# Patient Record
Sex: FEMALE | Race: WHITE | NOT HISPANIC OR LATINO | Employment: UNEMPLOYED | URBAN - METROPOLITAN AREA
[De-identification: names, ages, dates, MRNs, and addresses within clinical notes are randomized per-mention and may not be internally consistent; named-entity substitution may affect disease eponyms.]

---

## 2017-02-08 ENCOUNTER — OFFICE VISIT (OUTPATIENT)
Dept: INTERNAL MEDICINE | Facility: CLINIC | Age: 62
End: 2017-02-08

## 2017-02-08 VITALS
WEIGHT: 226.6 LBS | BODY MASS INDEX: 35.56 KG/M2 | SYSTOLIC BLOOD PRESSURE: 124 MMHG | DIASTOLIC BLOOD PRESSURE: 84 MMHG | TEMPERATURE: 97.5 F | HEIGHT: 67 IN

## 2017-02-08 DIAGNOSIS — R73.09 ELEVATED GLUCOSE: ICD-10-CM

## 2017-02-08 DIAGNOSIS — R06.02 SOB (SHORTNESS OF BREATH): Primary | ICD-10-CM

## 2017-02-08 DIAGNOSIS — R20.2 PARESTHESIA: ICD-10-CM

## 2017-02-08 DIAGNOSIS — Z87.891 HISTORY OF TOBACCO ABUSE: ICD-10-CM

## 2017-02-08 DIAGNOSIS — R07.89 OTHER CHEST PAIN: ICD-10-CM

## 2017-02-08 DIAGNOSIS — D50.8 OTHER IRON DEFICIENCY ANEMIA: ICD-10-CM

## 2017-02-08 DIAGNOSIS — K21.00 GASTROESOPHAGEAL REFLUX DISEASE WITH ESOPHAGITIS: ICD-10-CM

## 2017-02-08 LAB
ALBUMIN SERPL-MCNC: 4.3 G/DL (ref 3.2–4.8)
ALBUMIN/GLOB SERPL: 1.8 G/DL (ref 1.5–2.5)
ALP SERPL-CCNC: 81 U/L (ref 25–100)
ALT SERPL W P-5'-P-CCNC: 16 U/L (ref 7–40)
ANION GAP SERPL CALCULATED.3IONS-SCNC: 12 MMOL/L (ref 3–11)
AST SERPL-CCNC: 26 U/L (ref 0–33)
BASOPHILS # BLD AUTO: 0.04 10*3/MM3 (ref 0–0.2)
BASOPHILS NFR BLD AUTO: 0.6 % (ref 0–1)
BILIRUB SERPL-MCNC: 0.5 MG/DL (ref 0.3–1.2)
BUN BLD-MCNC: 16 MG/DL (ref 9–23)
BUN/CREAT SERPL: 32 (ref 7–25)
CALCIUM SPEC-SCNC: 10.2 MG/DL (ref 8.7–10.4)
CHLORIDE SERPL-SCNC: 100 MMOL/L (ref 99–109)
CO2 SERPL-SCNC: 27 MMOL/L (ref 20–31)
CREAT BLD-MCNC: 0.5 MG/DL (ref 0.6–1.3)
DEPRECATED RDW RBC AUTO: 65.3 FL (ref 37–54)
EOSINOPHIL # BLD AUTO: 0.07 10*3/MM3 (ref 0.1–0.3)
EOSINOPHIL NFR BLD AUTO: 1.1 % (ref 0–3)
ERYTHROCYTE [DISTWIDTH] IN BLOOD BY AUTOMATED COUNT: 21.5 % (ref 11.3–14.5)
FERRITIN SERPL-MCNC: 20 NG/ML (ref 10–291)
GFR SERPL CREATININE-BSD FRML MDRD: 126 ML/MIN/1.73
GLOBULIN UR ELPH-MCNC: 2.4 GM/DL
GLUCOSE BLD-MCNC: 79 MG/DL (ref 70–100)
HBA1C MFR BLD: 4.5 %
HCT VFR BLD AUTO: 33.6 % (ref 34.5–44)
HGB BLD-MCNC: 9.6 G/DL (ref 11.5–15.5)
HYPOCHROMIA BLD QL: NORMAL
IMM GRANULOCYTES # BLD: 0.01 10*3/MM3 (ref 0–0.03)
IMM GRANULOCYTES NFR BLD: 0.2 % (ref 0–0.6)
IRON 24H UR-MRATE: 19 MCG/DL (ref 50–175)
IRON SATN MFR SERPL: 5 % (ref 15–50)
LYMPHOCYTES # BLD AUTO: 1.58 10*3/MM3 (ref 0.6–4.8)
LYMPHOCYTES NFR BLD AUTO: 25.6 % (ref 24–44)
MCH RBC QN AUTO: 23.6 PG (ref 27–31)
MCHC RBC AUTO-ENTMCNC: 28.6 G/DL (ref 32–36)
MCV RBC AUTO: 82.6 FL (ref 80–99)
MONOCYTES # BLD AUTO: 0.45 10*3/MM3 (ref 0–1)
MONOCYTES NFR BLD AUTO: 7.3 % (ref 0–12)
NEUTROPHILS # BLD AUTO: 4.02 10*3/MM3 (ref 1.5–8.3)
NEUTROPHILS NFR BLD AUTO: 65.2 % (ref 41–71)
PLAT MORPH BLD: NORMAL
PLATELET # BLD AUTO: 292 10*3/MM3 (ref 150–450)
PMV BLD AUTO: 11 FL (ref 6–12)
POTASSIUM BLD-SCNC: 4.3 MMOL/L (ref 3.5–5.5)
PROT SERPL-MCNC: 6.7 G/DL (ref 5.7–8.2)
RBC # BLD AUTO: 4.07 10*6/MM3 (ref 3.89–5.14)
SODIUM BLD-SCNC: 139 MMOL/L (ref 132–146)
TIBC SERPL-MCNC: 421 MCG/DL (ref 250–450)
TSH SERPL DL<=0.05 MIU/L-ACNC: 2.71 MIU/ML (ref 0.35–5.35)
WBC MORPH BLD: NORMAL
WBC NRBC COR # BLD: 6.17 10*3/MM3 (ref 3.5–10.8)

## 2017-02-08 PROCEDURE — 93000 ELECTROCARDIOGRAM COMPLETE: CPT | Performed by: INTERNAL MEDICINE

## 2017-02-08 PROCEDURE — 99204 OFFICE O/P NEW MOD 45 MIN: CPT | Performed by: INTERNAL MEDICINE

## 2017-02-08 PROCEDURE — 83540 ASSAY OF IRON: CPT | Performed by: INTERNAL MEDICINE

## 2017-02-08 PROCEDURE — 82728 ASSAY OF FERRITIN: CPT | Performed by: INTERNAL MEDICINE

## 2017-02-08 PROCEDURE — 84443 ASSAY THYROID STIM HORMONE: CPT | Performed by: INTERNAL MEDICINE

## 2017-02-08 PROCEDURE — 85007 BL SMEAR W/DIFF WBC COUNT: CPT | Performed by: INTERNAL MEDICINE

## 2017-02-08 PROCEDURE — 80053 COMPREHEN METABOLIC PANEL: CPT | Performed by: INTERNAL MEDICINE

## 2017-02-08 PROCEDURE — 83550 IRON BINDING TEST: CPT | Performed by: INTERNAL MEDICINE

## 2017-02-08 PROCEDURE — 85025 COMPLETE CBC W/AUTO DIFF WBC: CPT | Performed by: INTERNAL MEDICINE

## 2017-02-08 PROCEDURE — 83036 HEMOGLOBIN GLYCOSYLATED A1C: CPT | Performed by: INTERNAL MEDICINE

## 2017-02-08 PROCEDURE — 82607 VITAMIN B-12: CPT | Performed by: INTERNAL MEDICINE

## 2017-02-08 RX ORDER — FLUTICASONE PROPIONATE 110 UG/1
1 AEROSOL, METERED RESPIRATORY (INHALATION)
COMMUNITY
End: 2019-06-03

## 2017-02-08 RX ORDER — OMEPRAZOLE 40 MG/1
40 CAPSULE, DELAYED RELEASE ORAL DAILY
COMMUNITY
End: 2018-05-23 | Stop reason: ALTCHOICE

## 2017-02-08 RX ORDER — ALBUTEROL SULFATE 90 UG/1
2 AEROSOL, METERED RESPIRATORY (INHALATION) EVERY 4 HOURS PRN
COMMUNITY
End: 2019-06-03

## 2017-02-08 NOTE — PROGRESS NOTES
Subjective   Jyoti Puga is a 61 y.o. female.     History of Present Illness     New pt here to establish, used to see Dr Hsu    She has been having SOB over the last 3 years. Started after she had a chest cold and was placed on several rounds of antibiotics, which helped some but never completely.  She has seen an allergist at [facility] about a year ago and did have pfts and skin testing and was told she had asthma and allergies. They wanted her to start allergy shots but she did not want to. She was given inhalers and uses them prn but not daily. She does think they help some  She did not have asthma as a child. Her allergies were to some foods and some trees.   Sounds like mostly in morning she coughes up clear and yellow stuff, and not as much the rest of the day, but does get SOb freqeuently with minimal exertion  She has had CXR in '15 and was neg. Has not had CT scan of chest.   She was a smoker, 1 1/2-2 ppd x years,did quit 3-4 yrs ago. Had never been told she had emphysema    About 4 weeks ago she was walking to a truck at her brother's and felt her heart racing for about 5 minutes, and felt chest  tightness as well. Had an ekg done years ago which was ok, but never a stress test. Occasional PND. No swelling generally. At times will use 2 pillows, but also has reflux, and sounds like she does this more for the reflux    gerd H/o Barretts, and HH - she takes ompeprazole but not every day. She does feel better when she takes it. She has taken it every day for the last week, and actually thinks her cough and breathing are better too. Sees Dr lucas, and had an EGD about 3yrs ago.    She had a fall and scooter hit her L foot 5 yrs ago. She has seen several podiatrists through the years. Both feet are tingly from toes to midfoot.  Her a1c in 10/15 was 6.0. Not thirsty but craves ice and does have polyuria. She is on fe supplement as well. She is not sure when her last labs were      ECG 12  "Lead  Date/Time: 2/8/2017 4:04 PM  Performed by: ALMA FORTE.  Authorized by: ALMA FORTE   Previous ECG: no previous ECG available  Rhythm: sinus rhythm  Rate: normal  Conduction: conduction normal  ST Segments: ST segments normal  T Waves: T waves normal  QRS axis: normal  Other: no other findings  Clinical impression: normal ECG        spirometry - fev1 - 2.24, fvc 2.61, fev1/fvc 85%  a1c - 4.5    The following portions of the patient's history were reviewed and updated as appropriate: allergies, current medications, past family history, past medical history, past social history, past surgical history and problem list.    Review of Systems   Constitutional: Negative for activity change and appetite change.   HENT: Positive for congestion.    Respiratory: Positive for cough, shortness of breath and wheezing.    Cardiovascular: Positive for chest pain and palpitations. Negative for leg swelling.   Gastrointestinal: Negative for abdominal distention, abdominal pain, constipation and diarrhea.   Endocrine: Positive for polyuria. Negative for polydipsia.   Skin:        H/o skin cancer, sees derm, Dr Murali hobbsualjose f   Neurological: Positive for numbness.       Objective   Blood pressure 124/84, temperature 97.5 °F (36.4 °C), temperature source Temporal Artery , height 67\" (170.2 cm), weight 226 lb 9.6 oz (103 kg).  Physical Exam   Constitutional: She is oriented to person, place, and time. She appears well-developed and well-nourished. No distress.   HENT:   Head: Normocephalic and atraumatic.   Right Ear: External ear normal.   Left Ear: External ear normal.   Mouth/Throat: Oropharynx is clear and moist. No oropharyngeal exudate.   Eyes: Conjunctivae and EOM are normal. Pupils are equal, round, and reactive to light.   Neck: Normal range of motion. Neck supple. No tracheal deviation present. No thyromegaly present.   No bruits   Cardiovascular: Normal rate, regular rhythm, normal heart sounds and intact distal " pulses.  Exam reveals no gallop and no friction rub.    No murmur heard.  2+ pedal pulses B   Pulmonary/Chest: Effort normal and breath sounds normal. No respiratory distress. She has no wheezes. She has no rales. She exhibits no tenderness.   Abdominal: Soft. Bowel sounds are normal. She exhibits no mass. There is tenderness (mild diffuse tenderness). There is no rebound and no guarding. No hernia.   Musculoskeletal: She exhibits no edema or deformity.   Lymphadenopathy:     She has no cervical adenopathy.   Neurological: She is alert and oriented to person, place, and time. No cranial nerve deficit.   Sensation normal B le's   Skin: Skin is warm and dry. No rash noted. She is not diaphoretic. There is pallor.   Psychiatric: She has a normal mood and affect. Her behavior is normal. Judgment and thought content normal.       Assessment/Plan   Jyoti was seen today for establish care, foot swelling, cough and shortness of breath.    Diagnoses and all orders for this visit:    SOB (shortness of breath) - may be reflux induced asthma. Also heavy smoking history though has quit. Will discuss further on return perhaps a different inhaler and have her use daily instead of just prn. Will check stress test as well  -     Pulmonary Function Test  -     ECG 12 Lead  -     Comprehensive Metabolic Panel  -     TSH  -     CBC & Differential  -     CBC Auto Differential  -     Adult Stress Echo With Color & Doppler    Elevated glucose  -     POC Glycosylated Hemoglobin (Hb A1C)    Other iron deficiency anemia - craving ice  -     Iron Profile  -     Ferritin    Other chest pain  -     Adult Stress Echo With Color & Doppler    History of tobacco abuse - she would qualify for CT lung ca screen - will discuss more at f/u    Paresthesia - a1c is low. Will check b12 as well, and may need emg/ncv    GERD/Barretts - sees Dr shayy martínezly. Encouraged her to take the prilosec daily    Will request old records from Dr Hsu  and [facility]

## 2017-02-09 DIAGNOSIS — D64.9 ANEMIA, UNSPECIFIED: Primary | ICD-10-CM

## 2017-02-09 LAB — VIT B12 BLD-MCNC: 515 PG/ML (ref 211–911)

## 2017-02-10 ENCOUNTER — TELEPHONE (OUTPATIENT)
Dept: INTERNAL MEDICINE | Facility: CLINIC | Age: 62
End: 2017-02-10

## 2017-02-10 PROBLEM — R59.9 SWELLING OF LYMPH NODES: Status: ACTIVE | Noted: 2017-02-10

## 2017-02-10 PROBLEM — N20.0 NEPHROLITHIASIS: Status: ACTIVE | Noted: 2017-02-10

## 2017-02-10 PROBLEM — K22.70 BARRETT'S ESOPHAGUS: Status: ACTIVE | Noted: 2017-02-10

## 2017-02-10 PROBLEM — R73.09 ELEVATED GLUCOSE: Status: ACTIVE | Noted: 2017-02-10

## 2017-02-10 PROBLEM — E78.5 HYPERLIPIDEMIA: Status: ACTIVE | Noted: 2017-02-10

## 2017-02-10 PROBLEM — K21.9 ESOPHAGEAL REFLUX: Status: ACTIVE | Noted: 2017-02-10

## 2017-02-10 PROBLEM — K27.9 PUD (PEPTIC ULCER DISEASE): Status: ACTIVE | Noted: 2017-02-10

## 2017-02-10 PROBLEM — R06.2 WHEEZING: Status: ACTIVE | Noted: 2017-02-10

## 2017-02-10 NOTE — TELEPHONE ENCOUNTER
----- Message from Waleska Miller MD sent at 2/10/2017  8:05 AM EST -----  Regarding: old records  Can we get last colon/egd reports from Dr Sandoval office, and can we get office note/spiromentry/labs from Dr Davis's office?

## 2017-02-17 ENCOUNTER — HOSPITAL ENCOUNTER (OUTPATIENT)
Dept: CARDIOLOGY | Facility: HOSPITAL | Age: 62
Discharge: HOME | End: 2017-02-17
Admitting: INTERNAL MEDICINE

## 2017-02-17 VITALS
WEIGHT: 220 LBS | HEIGHT: 67 IN | DIASTOLIC BLOOD PRESSURE: 82 MMHG | SYSTOLIC BLOOD PRESSURE: 136 MMHG | BODY MASS INDEX: 34.53 KG/M2 | HEART RATE: 77 BPM

## 2017-02-17 LAB
BH CV ECHO MEAS - AO ROOT AREA (BSA CORRECTED): 1.2
BH CV ECHO MEAS - AO ROOT AREA: 5.3 CM^2
BH CV ECHO MEAS - AO ROOT DIAM: 2.6 CM
BH CV ECHO MEAS - BSA(HAYCOCK): 2.2 M^2
BH CV ECHO MEAS - BSA: 2.1 M^2
BH CV ECHO MEAS - BZI_BMI: 34.5 KILOGRAMS/M^2
BH CV ECHO MEAS - BZI_METRIC_HEIGHT: 170.2 CM
BH CV ECHO MEAS - BZI_METRIC_WEIGHT: 99.8 KG
BH CV ECHO MEAS - EDV(CUBED): 112.7 ML
BH CV ECHO MEAS - EDV(TEICH): 109.1 ML
BH CV ECHO MEAS - EF(CUBED): 77.9 %
BH CV ECHO MEAS - EF(TEICH): 70 %
BH CV ECHO MEAS - ESV(CUBED): 24.9 ML
BH CV ECHO MEAS - ESV(TEICH): 32.8 ML
BH CV ECHO MEAS - FS: 39.5 %
BH CV ECHO MEAS - IVS/LVPW: 1.1
BH CV ECHO MEAS - IVSD: 1.1 CM
BH CV ECHO MEAS - LA DIMENSION: 3.8 CM
BH CV ECHO MEAS - LA/AO: 1.5
BH CV ECHO MEAS - LV MASS(C)D: 186.2 GRAMS
BH CV ECHO MEAS - LV MASS(C)DI: 88.4 GRAMS/M^2
BH CV ECHO MEAS - LV MAX PG: 7.1 MMHG
BH CV ECHO MEAS - LV MEAN PG: 3 MMHG
BH CV ECHO MEAS - LV V1 MAX: 133 CM/SEC
BH CV ECHO MEAS - LV V1 MEAN: 78.6 CM/SEC
BH CV ECHO MEAS - LV V1 VTI: 29 CM
BH CV ECHO MEAS - LVIDD: 4.8 CM
BH CV ECHO MEAS - LVIDS: 2.9 CM
BH CV ECHO MEAS - LVOT AREA (M): 3.1 CM^2
BH CV ECHO MEAS - LVOT AREA: 3.1 CM^2
BH CV ECHO MEAS - LVOT DIAM: 2 CM
BH CV ECHO MEAS - LVPWD: 1 CM
BH CV ECHO MEAS - MV A MAX VEL: 77.5 CM/SEC
BH CV ECHO MEAS - MV DEC SLOPE: 369.5 CM/SEC^2
BH CV ECHO MEAS - MV DEC TIME: 0.26 SEC
BH CV ECHO MEAS - MV E MAX VEL: 127 CM/SEC
BH CV ECHO MEAS - MV E/A: 1.6
BH CV ECHO MEAS - MV P1/2T MAX VEL: 130 CM/SEC
BH CV ECHO MEAS - MV P1/2T: 103 MSEC
BH CV ECHO MEAS - MVA P1/2T LCG: 1.7 CM^2
BH CV ECHO MEAS - MVA(P1/2T): 2.1 CM^2
BH CV ECHO MEAS - PA ACC SLOPE: 714 CM/SEC^2
BH CV ECHO MEAS - PA ACC TIME: 0.16 SEC
BH CV ECHO MEAS - PA PR(ACCEL): 9.3 MMHG
BH CV ECHO MEAS - PULM DIAS VEL: 48.5 CM/SEC
BH CV ECHO MEAS - PULM S/D: 1.5
BH CV ECHO MEAS - PULM SYS VEL: 71.4 CM/SEC
BH CV ECHO MEAS - RAP SYSTOLE: 10 MMHG
BH CV ECHO MEAS - RV MAX PG: 1.6 MMHG
BH CV ECHO MEAS - RV V1 MAX: 64.2 CM/SEC
BH CV ECHO MEAS - RVSP: 23.2 MMHG
BH CV ECHO MEAS - SI(CUBED): 41.7 ML/M^2
BH CV ECHO MEAS - SI(LVOT): 43.3 ML/M^2
BH CV ECHO MEAS - SI(TEICH): 36.3 ML/M^2
BH CV ECHO MEAS - SV(CUBED): 87.8 ML
BH CV ECHO MEAS - SV(LVOT): 91.1 ML
BH CV ECHO MEAS - SV(TEICH): 76.3 ML
BH CV ECHO MEAS - TAPSE (>1.6): 2.7 CM2
BH CV ECHO MEAS - TR MAX VEL: 182 CM/SEC
BH CV STRESS BP STAGE 1: NORMAL
BH CV STRESS BP STAGE 2: NORMAL
BH CV STRESS BP STAGE 3: NORMAL
BH CV STRESS DOSE DOBUTAMINE STAGE 1: 10
BH CV STRESS DOSE DOBUTAMINE STAGE 2: 20
BH CV STRESS DOSE DOBUTAMINE STAGE 3: 30
BH CV STRESS DURATION MIN STAGE 1: 2
BH CV STRESS DURATION MIN STAGE 2: 2
BH CV STRESS DURATION MIN STAGE 3: 3
BH CV STRESS DURATION SEC STAGE 1: 0
BH CV STRESS DURATION SEC STAGE 2: 0
BH CV STRESS DURATION SEC STAGE 3: 25
BH CV STRESS ECHO POST STRESS EJECTION FRACTION EF: 75 %
BH CV STRESS HR STAGE 1: 88
BH CV STRESS HR STAGE 2: 116
BH CV STRESS HR STAGE 3: 144
BH CV STRESS PROTOCOL 1: NORMAL
BH CV STRESS RATE STAGE 1: 60
BH CV STRESS RATE STAGE 2: 120
BH CV STRESS RATE STAGE 3: 180
BH CV STRESS RECOVERY BP: NORMAL MMHG
BH CV STRESS RECOVERY HR: 89 BPM
BH CV STRESS STAGE 1: 1
BH CV STRESS STAGE 2: 2
BH CV STRESS STAGE 3: 3
BH CV XLRA - RV BASE: 2.6 CM
BH CV XLRA - RV LENGTH: 6.5 CM
BH CV XLRA - RV MID: 2 CM
BH CV XLRA - TDI S': 15.6 CM/SEC
LEFT ATRIUM VOLUME INDEX: 22.3 ML/M2
LEFT ATRIUM VOLUME: 47 CM3
LV EF 2D ECHO EST: 60 %
MAXIMAL PREDICTED HEART RATE: 161 BPM
PERCENT MAX PREDICTED HR: 89.44 %
STRESS BASELINE BP: NORMAL MMHG
STRESS BASELINE HR: 71 BPM
STRESS PERCENT HR: 105 %
STRESS POST ESTIMATED WORKLOAD: 1 METS
STRESS POST EXERCISE DUR MIN: 7 MIN
STRESS POST EXERCISE DUR SEC: 25 SEC
STRESS POST PEAK BP: NORMAL MMHG
STRESS POST PEAK HR: 144 BPM
STRESS TARGET HR: 137 BPM

## 2017-02-17 PROCEDURE — 93325 DOPPLER ECHO COLOR FLOW MAPG: CPT

## 2017-02-17 PROCEDURE — 93325 DOPPLER ECHO COLOR FLOW MAPG: CPT | Performed by: INTERNAL MEDICINE

## 2017-02-17 PROCEDURE — 93350 STRESS TTE ONLY: CPT | Performed by: INTERNAL MEDICINE

## 2017-02-17 PROCEDURE — 93320 DOPPLER ECHO COMPLETE: CPT | Performed by: INTERNAL MEDICINE

## 2017-02-17 PROCEDURE — 93018 CV STRESS TEST I&R ONLY: CPT | Performed by: INTERNAL MEDICINE

## 2017-02-17 PROCEDURE — 93017 CV STRESS TEST TRACING ONLY: CPT

## 2017-02-17 PROCEDURE — 93320 DOPPLER ECHO COMPLETE: CPT

## 2017-02-17 PROCEDURE — 93351 STRESS TTE COMPLETE: CPT

## 2017-02-17 RX ORDER — DOBUTAMINE HYDROCHLORIDE 100 MG/100ML
2-20 INJECTION INTRAVENOUS
Status: DISCONTINUED | OUTPATIENT
Start: 2017-02-17 | End: 2017-02-18 | Stop reason: HOSPADM

## 2017-02-20 ENCOUNTER — TELEPHONE (OUTPATIENT)
Dept: INTERNAL MEDICINE | Facility: CLINIC | Age: 62
End: 2017-02-20

## 2017-02-20 NOTE — TELEPHONE ENCOUNTER
----- Message from Waleska Miller MD sent at 2/20/2017  7:58 AM EST -----  Regarding: last labs  Can we see if Dr Hsu's office could send over last labs? I think pt did sign a DEUCE when she was here, but may take awhile to get all her records, and the labs we did were abnormal so I wanted to compare w/ her last ones

## 2017-02-22 ENCOUNTER — OFFICE VISIT (OUTPATIENT)
Dept: INTERNAL MEDICINE | Facility: CLINIC | Age: 62
End: 2017-02-22

## 2017-02-22 VITALS
HEIGHT: 67 IN | TEMPERATURE: 97.6 F | WEIGHT: 225.8 LBS | SYSTOLIC BLOOD PRESSURE: 112 MMHG | BODY MASS INDEX: 35.44 KG/M2 | DIASTOLIC BLOOD PRESSURE: 74 MMHG

## 2017-02-22 DIAGNOSIS — K22.70 BARRETT'S ESOPHAGUS WITHOUT DYSPLASIA: ICD-10-CM

## 2017-02-22 DIAGNOSIS — Z87.891 PERSONAL HISTORY OF TOBACCO USE, PRESENTING HAZARDS TO HEALTH: ICD-10-CM

## 2017-02-22 DIAGNOSIS — D50.8 OTHER IRON DEFICIENCY ANEMIA: Primary | ICD-10-CM

## 2017-02-22 DIAGNOSIS — J45.20 MILD INTERMITTENT ASTHMA WITHOUT COMPLICATION: ICD-10-CM

## 2017-02-22 PROCEDURE — 99214 OFFICE O/P EST MOD 30 MIN: CPT | Performed by: INTERNAL MEDICINE

## 2017-02-22 NOTE — TELEPHONE ENCOUNTER
I have faxed over the DEUCE to get her medical records.  I asked if they could go ahead and fax over her last labs.

## 2017-02-22 NOTE — PROGRESS NOTES
"Subjective   Jyoti Puga is a 61 y.o. female.     History of Present Illness     Here for f/u on her recent labs and stress test    fe def anemia - she had been taking fe supplement for the last month with food - can't take it on an empty stomach. Had been told her fe was low in past and had taken fe in the past intermittently. Her EGD was in '14 w/ Dr Sandoval and she is on a 3 yr schedule. The last colon was '14 and repeat due in 5 yrs. She does not know when her last labs were, and Dr Hsu's office does not have any recent labs on her    Gerd/Barretts - she is taking the prilosec daily. She is not sure if it is helping with the cough    Cough - She is taking flovent but not daily, but most days takes 2 puffs at a time when she takes. Uses zyrtec as needed as well.     Palpitations - no more episodes since last visit. Stress test wsa normal except for a few PAcs and PVCs    The following portions of the patient's history were reviewed and updated as appropriate: allergies, current medications, past family history, past medical history, past social history, past surgical history and problem list.    Review of Systems   Constitutional: Negative for unexpected weight change.   Respiratory: Positive for cough, shortness of breath and wheezing.    Cardiovascular: Negative for chest pain and palpitations (none recently).       Objective   Blood pressure 112/74, temperature 97.6 °F (36.4 °C), temperature source Temporal Artery , height 67\" (170.2 cm), weight 225 lb 12.8 oz (102 kg).  Physical Exam   Constitutional: She is oriented to person, place, and time. She appears well-developed and well-nourished. No distress.   HENT:   Head: Normocephalic and atraumatic.   Right Ear: External ear normal.   Left Ear: External ear normal.   Mouth/Throat: Oropharynx is clear and moist. No oropharyngeal exudate.   Eyes: Conjunctivae and EOM are normal. Pupils are equal, round, and reactive to light.   Neck: Normal range of " motion. Neck supple. No tracheal deviation present. No thyromegaly present.   No bruits   Cardiovascular: Normal rate, regular rhythm, normal heart sounds and intact distal pulses.  Exam reveals no gallop and no friction rub.    No murmur heard.  2+ pedal pulses B   Pulmonary/Chest: Effort normal and breath sounds normal. No respiratory distress. She has no wheezes. She has no rales. She exhibits no tenderness.   Abdominal: Soft. Bowel sounds are normal. She exhibits no mass. Tenderness: mild epigastric and RLQ. There is no rebound and no guarding. No hernia.   Musculoskeletal: She exhibits no edema or deformity.   Lymphadenopathy:     She has no cervical adenopathy.   Neurological: She is alert and oriented to person, place, and time. No cranial nerve deficit.   Sensation normal B le's   Skin: Skin is warm and dry. No rash noted. She is not diaphoretic. There is pallor.   Psychiatric: She has a normal mood and affect. Her behavior is normal. Judgment and thought content normal.       Assessment/Plan   Jyoti was seen today for follow-up.    Diagnoses and all orders for this visit:    Other iron deficiency anemia. Will have her increase fe to bid. She does take w/ food but cannot tolerate on an empty stomach. Will get egd. If this is neg, may need colon as well  -     Ambulatory referral for Screening EGD    Estrada's esophagus without dysplasia - due in 6/17 anyway  -     Ambulatory referral for Screening EGD    Personal history of tobacco use, presenting hazards to health  -     CT Chest Low Dose Wo    Mild intermittent asthma without complication - will try samples of dulera 200 2 bid to see if this helps her cough any better than the flovent. If she likes it, call for Rx        Prev - schedule a physical here

## 2017-02-28 ENCOUNTER — TELEPHONE (OUTPATIENT)
Dept: INTERNAL MEDICINE | Facility: CLINIC | Age: 62
End: 2017-02-28

## 2017-02-28 NOTE — TELEPHONE ENCOUNTER
----- Message from Marie Ferreira sent at 2/28/2017  9:43 AM EST -----  Contact: PATIENT  WOULD LIKE A CALL BACK REGARDING INS. PAYING FOR TEST, SHE SAID YOU ALL SPOKE ABOUT THIS AT HER LAST VISIT

## 2017-03-07 ENCOUNTER — HOSPITAL ENCOUNTER (OUTPATIENT)
Dept: CT IMAGING | Facility: HOSPITAL | Age: 62
Discharge: HOME | End: 2017-03-07
Admitting: INTERNAL MEDICINE

## 2017-03-07 PROCEDURE — G0297 LDCT FOR LUNG CA SCREEN: HCPCS

## 2017-03-08 ENCOUNTER — TELEPHONE (OUTPATIENT)
Dept: INTERNAL MEDICINE | Facility: CLINIC | Age: 62
End: 2017-03-08

## 2017-03-08 DIAGNOSIS — K44.9 HIATAL HERNIA: Primary | ICD-10-CM

## 2017-03-08 NOTE — TELEPHONE ENCOUNTER
----- Message from Waleska Miller MD sent at 3/8/2017  7:36 AM EST -----  Can you let her know, her cat scan did not show any lung cancer, but it did show that she has some emphysema, and also her hiatal hernia is very big - most of her stomach is in her chest. This may be causing some of her breathing problems. She will be seeing Dr Sandoval for f/u soon, but we could also have her see dr Franco Gongora, who works with Dr Sandoval, to see if the hiatal hernia would be worth repairing since it is so large

## 2017-05-22 ENCOUNTER — OFFICE VISIT (OUTPATIENT)
Dept: INTERNAL MEDICINE | Facility: CLINIC | Age: 62
End: 2017-05-22

## 2017-05-22 VITALS
HEART RATE: 68 BPM | TEMPERATURE: 97.3 F | DIASTOLIC BLOOD PRESSURE: 70 MMHG | SYSTOLIC BLOOD PRESSURE: 122 MMHG | BODY MASS INDEX: 34.62 KG/M2 | RESPIRATION RATE: 12 BRPM | HEIGHT: 67 IN | WEIGHT: 220.6 LBS | OXYGEN SATURATION: 95 %

## 2017-05-22 DIAGNOSIS — J44.9 CHRONIC OBSTRUCTIVE PULMONARY DISEASE, UNSPECIFIED COPD TYPE (HCC): ICD-10-CM

## 2017-05-22 DIAGNOSIS — Z23 NEED FOR PNEUMOCOCCAL VACCINATION: ICD-10-CM

## 2017-05-22 DIAGNOSIS — Z11.59 NEED FOR HEPATITIS C SCREENING TEST: ICD-10-CM

## 2017-05-22 DIAGNOSIS — Z78.0 POSTMENOPAUSAL: ICD-10-CM

## 2017-05-22 DIAGNOSIS — Z00.00 ENCOUNTER FOR ANNUAL PHYSICAL EXAM: Primary | ICD-10-CM

## 2017-05-22 DIAGNOSIS — F32.9 REACTIVE DEPRESSION: ICD-10-CM

## 2017-05-22 DIAGNOSIS — D50.8 OTHER IRON DEFICIENCY ANEMIA: ICD-10-CM

## 2017-05-22 LAB
ALBUMIN SERPL-MCNC: 4.2 G/DL (ref 3.2–4.8)
ALBUMIN/GLOB SERPL: 1.8 G/DL (ref 1.5–2.5)
ALP SERPL-CCNC: 75 U/L (ref 25–100)
ALT SERPL W P-5'-P-CCNC: 12 U/L (ref 7–40)
ANION GAP SERPL CALCULATED.3IONS-SCNC: 3 MMOL/L (ref 3–11)
ARTICHOKE IGE QN: 142 MG/DL (ref 0–130)
AST SERPL-CCNC: 19 U/L (ref 0–33)
BASOPHILS # BLD AUTO: 0.03 10*3/MM3 (ref 0–0.2)
BASOPHILS NFR BLD AUTO: 0.6 % (ref 0–1)
BILIRUB BLD-MCNC: NEGATIVE MG/DL
BILIRUB SERPL-MCNC: 0.5 MG/DL (ref 0.3–1.2)
BUN BLD-MCNC: 12 MG/DL (ref 9–23)
BUN/CREAT SERPL: 24 (ref 7–25)
CALCIUM SPEC-SCNC: 9.6 MG/DL (ref 8.7–10.4)
CHLORIDE SERPL-SCNC: 108 MMOL/L (ref 99–109)
CHOLEST SERPL-MCNC: 272 MG/DL (ref 0–200)
CLARITY, POC: CLEAR
CO2 SERPL-SCNC: 30 MMOL/L (ref 20–31)
COLOR UR: YELLOW
CREAT BLD-MCNC: 0.5 MG/DL (ref 0.6–1.3)
DEPRECATED RDW RBC AUTO: 53.2 FL (ref 37–54)
EOSINOPHIL # BLD AUTO: 0.08 10*3/MM3 (ref 0.1–0.3)
EOSINOPHIL NFR BLD AUTO: 1.6 % (ref 0–3)
ERYTHROCYTE [DISTWIDTH] IN BLOOD BY AUTOMATED COUNT: 16.4 % (ref 11.3–14.5)
FERRITIN SERPL-MCNC: 20 NG/ML (ref 10–291)
GFR SERPL CREATININE-BSD FRML MDRD: 126 ML/MIN/1.73
GLOBULIN UR ELPH-MCNC: 2.4 GM/DL
GLUCOSE BLD-MCNC: 96 MG/DL (ref 70–100)
GLUCOSE UR STRIP-MCNC: NEGATIVE MG/DL
HCT VFR BLD AUTO: 43 % (ref 34.5–44)
HCV AB SER DONR QL: NORMAL
HDLC SERPL-MCNC: 51 MG/DL (ref 40–60)
HGB BLD-MCNC: 13.7 G/DL (ref 11.5–15.5)
IMM GRANULOCYTES # BLD: 0.01 10*3/MM3 (ref 0–0.03)
IMM GRANULOCYTES NFR BLD: 0.2 % (ref 0–0.6)
IRON 24H UR-MRATE: 44 MCG/DL (ref 50–175)
IRON SATN MFR SERPL: 13 % (ref 15–50)
KETONES UR QL: NEGATIVE
LEUKOCYTE EST, POC: NEGATIVE
LYMPHOCYTES # BLD AUTO: 1.27 10*3/MM3 (ref 0.6–4.8)
LYMPHOCYTES NFR BLD AUTO: 25.7 % (ref 24–44)
MCH RBC QN AUTO: 28.3 PG (ref 27–31)
MCHC RBC AUTO-ENTMCNC: 31.9 G/DL (ref 32–36)
MCV RBC AUTO: 88.8 FL (ref 80–99)
MONOCYTES # BLD AUTO: 0.38 10*3/MM3 (ref 0–1)
MONOCYTES NFR BLD AUTO: 7.7 % (ref 0–12)
NEUTROPHILS # BLD AUTO: 3.18 10*3/MM3 (ref 1.5–8.3)
NEUTROPHILS NFR BLD AUTO: 64.2 % (ref 41–71)
NITRITE UR-MCNC: NEGATIVE MG/ML
PH UR: 6 [PH] (ref 5–8)
PLATELET # BLD AUTO: 180 10*3/MM3 (ref 150–450)
PMV BLD AUTO: 11.4 FL (ref 6–12)
POTASSIUM BLD-SCNC: 4.4 MMOL/L (ref 3.5–5.5)
PROT SERPL-MCNC: 6.6 G/DL (ref 5.7–8.2)
PROT UR STRIP-MCNC: NEGATIVE MG/DL
RBC # BLD AUTO: 4.84 10*6/MM3 (ref 3.89–5.14)
RBC # UR STRIP: NEGATIVE /UL
SODIUM BLD-SCNC: 141 MMOL/L (ref 132–146)
SP GR UR: 1.03 (ref 1–1.03)
TIBC SERPL-MCNC: 338 MCG/DL (ref 250–450)
TRIGL SERPL-MCNC: 455 MG/DL (ref 0–150)
UROBILINOGEN UR QL: NORMAL
WBC NRBC COR # BLD: 4.95 10*3/MM3 (ref 3.5–10.8)

## 2017-05-22 PROCEDURE — 83540 ASSAY OF IRON: CPT | Performed by: INTERNAL MEDICINE

## 2017-05-22 PROCEDURE — 90732 PPSV23 VACC 2 YRS+ SUBQ/IM: CPT | Performed by: INTERNAL MEDICINE

## 2017-05-22 PROCEDURE — 90471 IMMUNIZATION ADMIN: CPT | Performed by: INTERNAL MEDICINE

## 2017-05-22 PROCEDURE — 81003 URINALYSIS AUTO W/O SCOPE: CPT | Performed by: INTERNAL MEDICINE

## 2017-05-22 PROCEDURE — 86803 HEPATITIS C AB TEST: CPT | Performed by: INTERNAL MEDICINE

## 2017-05-22 PROCEDURE — 80053 COMPREHEN METABOLIC PANEL: CPT | Performed by: INTERNAL MEDICINE

## 2017-05-22 PROCEDURE — 82728 ASSAY OF FERRITIN: CPT | Performed by: INTERNAL MEDICINE

## 2017-05-22 PROCEDURE — 83550 IRON BINDING TEST: CPT | Performed by: INTERNAL MEDICINE

## 2017-05-22 PROCEDURE — 99396 PREV VISIT EST AGE 40-64: CPT | Performed by: INTERNAL MEDICINE

## 2017-05-22 PROCEDURE — 80061 LIPID PANEL: CPT | Performed by: INTERNAL MEDICINE

## 2017-05-22 PROCEDURE — 85025 COMPLETE CBC W/AUTO DIFF WBC: CPT | Performed by: INTERNAL MEDICINE

## 2017-06-08 ENCOUNTER — HOSPITAL ENCOUNTER (OUTPATIENT)
Dept: BONE DENSITY | Facility: HOSPITAL | Age: 62
Discharge: HOME | End: 2017-06-08
Admitting: INTERNAL MEDICINE

## 2017-06-08 ENCOUNTER — APPOINTMENT (OUTPATIENT)
Dept: BONE DENSITY | Facility: HOSPITAL | Age: 62
End: 2017-06-08

## 2017-06-08 PROCEDURE — 77080 DXA BONE DENSITY AXIAL: CPT | Performed by: RADIOLOGY

## 2017-06-08 PROCEDURE — 77080 DXA BONE DENSITY AXIAL: CPT

## 2017-09-29 ENCOUNTER — APPOINTMENT (OUTPATIENT)
Dept: BONE DENSITY | Facility: HOSPITAL | Age: 62
End: 2017-09-29

## 2018-02-07 ENCOUNTER — TELEPHONE (OUTPATIENT)
Dept: INTERNAL MEDICINE | Facility: CLINIC | Age: 63
End: 2018-02-07

## 2018-02-07 DIAGNOSIS — Z87.891 PERSONAL HISTORY OF TOBACCO USE, PRESENTING HAZARDS TO HEALTH: Primary | ICD-10-CM

## 2018-02-07 NOTE — TELEPHONE ENCOUNTER
----- Message from Waleska Miller MD sent at 2/7/2018  8:04 AM EST -----  She is due for her CT lung cancer screen in march - is she ok if we go ahead and schedule

## 2018-02-12 ENCOUNTER — HOSPITAL ENCOUNTER (OUTPATIENT)
Dept: CT IMAGING | Facility: HOSPITAL | Age: 63
Discharge: HOME | End: 2018-02-12
Admitting: INTERNAL MEDICINE

## 2018-02-12 PROCEDURE — G0297 LDCT FOR LUNG CA SCREEN: HCPCS

## 2018-02-14 ENCOUNTER — TELEPHONE (OUTPATIENT)
Dept: INTERNAL MEDICINE | Facility: CLINIC | Age: 63
End: 2018-02-14

## 2018-02-14 NOTE — TELEPHONE ENCOUNTER
----- Message from Waleska Miller MD sent at 2/14/2018  7:50 AM EST -----  Can you let her know, the CT scan looks stable - no significant change form last year. We can plan to repeat in 1 year. She is due for a f/u w/ me and fasting labs to recheck her triglycerides if she could schedule appt soon

## 2018-05-22 NOTE — PROGRESS NOTES
Here for physical     Emphysema - she feels like she is breathing better. She has not needed the albuterol inhaler much. Trying to walk more and has helped with her breathing     Large HH -and h/o Barretts. She is taking the omeprazole bid but insurance is not covering. She does think aciphex is covered. She sees Dr Lori MCFARLANE foot pain -through her ankle. had an injury where a scooter fell on her foot 2-3 yrs ago. She did see a new podiatrist last year and felt like it was arthritis. Hard to walk regularly because of the foot. Hurts on top of foot     Exercise - she has been trying to walk most days with the warmer weather; also doing some exercises w/ weights  Diet - healthy overall. Tries to watch her sweets.she is on fe 2-3/week. D occasionally, b12 occasionally, some cheese daily.    The following portions of the patient's history were reviewed and updated as appropriate: allergies, current medications, past family history, past medical history, past social history, past surgical history and problem list.    Review of Systems   Constitutional: Negative for fatigue and fever. wt stable  HENT: Negative for congestion and sore throat.  felt knot on neck about 6months ago  Eyes: Negative for visual disturbance.   Respiratory: Negative for cough and shortness of breath.    Cardiovascular: Negative for chest pain, palpitations and leg swelling.   Gastrointestinal: Negative for abdominal distention, abdominal pain, blood in stool, constipation, diarrhea and nausea.   Genitourinary: Negative for difficulty urinating, dysuria and frequency.   Musculoskeletal: + for arthralgias - L ankle. R shoulder has been hurting just today - no known injury  Skin: Negative for rash.   Allergic/Immunologic: Negative for immunocompromised state. noticed a knot in left neck x 6m  Neurological: Negative for dizziness and headaches. +numbness B toes x years  Psychiatric/Behavioral: + for dysphoric mood and sleep disturbance - wants  "to find a job so has been hard.       Objective    Vitals:    05/23/18 1021   BP: 126/84   BP Location: Right arm   Pulse: 63   Temp: 98.7 °F (37.1 °C)   TempSrc: Temporal Artery    SpO2: 96%   Weight: 101 kg (223 lb 6.4 oz)   Height: 167.6 cm (66\")     Physical Exam   Constitutional: She is oriented to person, place, and time. She appears well-developed and well-nourished. No distress.   HENT:   Head: Normocephalic and atraumatic.   Right Ear: External ear normal.   Left Ear: External ear normal.   Mouth/Throat: Oropharynx is clear and moist. No oropharyngeal exudate.   Eyes: Conjunctivae and EOM are normal. Pupils are equal, round, and reactive to light.   Neck: Normal range of motion. Neck supple. No thyromegaly present. posterior L cervical - ~2cm mobile knot, nontender  Cardiovascular: Normal rate, regular rhythm, normal heart sounds and intact distal pulses.  Exam reveals no gallop and no friction rub.    No murmur heard.  Pulmonary/Chest: Effort normal and breath sounds normal. No respiratory distress. She has no wheezes. She has no rales.   Breast exam: no masses, no tenderness, no skin lesions B  Abdominal: Soft. Bowel sounds are normal. She exhibits no mass. There is no tenderness. There is no rebound and no guarding.   Musculoskeletal:  She exhibits no edema or deformity. tender over R anterior shoulder, w/ some pain w/ active and passive ROM  Lymphadenopathy: left lateral neck as above; no other LNs appreciated  Neurological: She is alert and oriented to person, place, and time. No cranial nerve deficit. She exhibits normal muscle tone. Coordination normal.   Skin: Skin is warm and dry. She is not diaphoretic.   Psychiatric: She has a normal mood and affect. Her behavior is normal. Judgment and thought content normal.       Assessment/Plan   Jyoti was seen today for annual exam and heartburn.    Diagnoses and all orders for this visit:    Routine general medical examination at [facility] facility  Regular exercise/healthy diet. BSE q month. Sunscreen use encouraged. calcium intake reviewed. Check fasting labs  Jong due 6/18 - [facility]. She has it scheduled  Colon due in 3/22  DT due now  DEXA due 5/20 (normal in 5/17)  Shingles - shingrix discussed -  can check at pharmacy since we do not have   She had pneumovax (smoker) last year, will give prevnar age 67  CT lung cancer screen due in 2/19  She does not need paps as she had hysterectomy for benign reasons  -     CBC & Differential  -     Lipid Panel  -     TSH  -     Comprehensive Metabolic Panel  -     POC Urinalysis Dipstick, Automated  -     CBC Auto Differential    Estrada's esophagus without dysplasia  Comments:  EGD due in 3/19 w/ Lori  -     RABEprazole (ACIPHEX) 20 MG EC tablet; Take 1 tablet by mouth Daily.      Other iron deficiency anemia  -     Iron Profile  -     Ferritin    Need for Tdap vaccination  -     Tdap Vaccine Greater Than or Equal To 8yo IM    Foot pain, left -She will make a f/u appt w/ dr cardozo, the podiatrist; he had mentioned possibly an orthotic    Acute pain of right shoulder    Neck mass  -     US Head Neck Soft Tissue

## 2018-05-23 ENCOUNTER — OFFICE VISIT (OUTPATIENT)
Dept: INTERNAL MEDICINE | Facility: CLINIC | Age: 63
End: 2018-05-23

## 2018-05-23 VITALS
BODY MASS INDEX: 35.9 KG/M2 | HEIGHT: 66 IN | OXYGEN SATURATION: 96 % | WEIGHT: 223.4 LBS | TEMPERATURE: 98.7 F | DIASTOLIC BLOOD PRESSURE: 84 MMHG | HEART RATE: 63 BPM | SYSTOLIC BLOOD PRESSURE: 126 MMHG

## 2018-05-23 DIAGNOSIS — Z23 NEED FOR TDAP VACCINATION: ICD-10-CM

## 2018-05-23 DIAGNOSIS — D50.8 OTHER IRON DEFICIENCY ANEMIA: ICD-10-CM

## 2018-05-23 DIAGNOSIS — M79.672 FOOT PAIN, LEFT: ICD-10-CM

## 2018-05-23 DIAGNOSIS — R22.1 NECK MASS: ICD-10-CM

## 2018-05-23 DIAGNOSIS — K22.70 BARRETT'S ESOPHAGUS WITHOUT DYSPLASIA: ICD-10-CM

## 2018-05-23 DIAGNOSIS — M25.511 ACUTE PAIN OF RIGHT SHOULDER: ICD-10-CM

## 2018-05-23 DIAGNOSIS — Z00.00 ROUTINE GENERAL MEDICAL EXAMINATION AT A HEALTH CARE FACILITY: Primary | ICD-10-CM

## 2018-05-23 LAB
ALBUMIN SERPL-MCNC: 4.3 G/DL (ref 3.2–4.8)
ALBUMIN/GLOB SERPL: 1.7 G/DL (ref 1.5–2.5)
ALP SERPL-CCNC: 73 U/L (ref 25–100)
ALT SERPL W P-5'-P-CCNC: 11 U/L (ref 7–40)
ANION GAP SERPL CALCULATED.3IONS-SCNC: 0 MMOL/L (ref 3–11)
ARTICHOKE IGE QN: 132 MG/DL (ref 0–130)
AST SERPL-CCNC: 22 U/L (ref 0–33)
BASOPHILS # BLD AUTO: 0.04 10*3/MM3 (ref 0–0.2)
BASOPHILS NFR BLD AUTO: 0.7 % (ref 0–1)
BILIRUB BLD-MCNC: NEGATIVE MG/DL
BILIRUB SERPL-MCNC: 0.6 MG/DL (ref 0.3–1.2)
BUN BLD-MCNC: 14 MG/DL (ref 9–23)
BUN/CREAT SERPL: 23.3 (ref 7–25)
CALCIUM SPEC-SCNC: 9 MG/DL (ref 8.7–10.4)
CHLORIDE SERPL-SCNC: 102 MMOL/L (ref 99–109)
CHOLEST SERPL-MCNC: 243 MG/DL (ref 0–200)
CLARITY, POC: CLEAR
CO2 SERPL-SCNC: 37 MMOL/L (ref 20–31)
COLOR UR: YELLOW
CREAT BLD-MCNC: 0.6 MG/DL (ref 0.6–1.3)
DEPRECATED RDW RBC AUTO: 45.2 FL (ref 37–54)
EOSINOPHIL # BLD AUTO: 0.06 10*3/MM3 (ref 0–0.3)
EOSINOPHIL NFR BLD AUTO: 1.1 % (ref 0–3)
ERYTHROCYTE [DISTWIDTH] IN BLOOD BY AUTOMATED COUNT: 14.5 % (ref 11.3–14.5)
FERRITIN SERPL-MCNC: 11 NG/ML (ref 10–291)
GFR SERPL CREATININE-BSD FRML MDRD: 102 ML/MIN/1.73
GLOBULIN UR ELPH-MCNC: 2.5 GM/DL
GLUCOSE BLD-MCNC: 93 MG/DL (ref 70–100)
GLUCOSE UR STRIP-MCNC: NEGATIVE MG/DL
HCT VFR BLD AUTO: 39.7 % (ref 34.5–44)
HDLC SERPL-MCNC: 44 MG/DL (ref 40–60)
HGB BLD-MCNC: 12.8 G/DL (ref 11.5–15.5)
IMM GRANULOCYTES # BLD: 0.01 10*3/MM3 (ref 0–0.03)
IMM GRANULOCYTES NFR BLD: 0.2 % (ref 0–0.6)
IRON 24H UR-MRATE: 72 MCG/DL (ref 50–175)
IRON SATN MFR SERPL: 20 % (ref 15–50)
KETONES UR QL: NEGATIVE
LEUKOCYTE EST, POC: NEGATIVE
LYMPHOCYTES # BLD AUTO: 2.09 10*3/MM3 (ref 0.6–4.8)
LYMPHOCYTES NFR BLD AUTO: 37.2 % (ref 24–44)
MCH RBC QN AUTO: 27.5 PG (ref 27–31)
MCHC RBC AUTO-ENTMCNC: 32.2 G/DL (ref 32–36)
MCV RBC AUTO: 85.4 FL (ref 80–99)
MONOCYTES # BLD AUTO: 0.33 10*3/MM3 (ref 0–1)
MONOCYTES NFR BLD AUTO: 5.9 % (ref 0–12)
NEUTROPHILS # BLD AUTO: 3.1 10*3/MM3 (ref 1.5–8.3)
NEUTROPHILS NFR BLD AUTO: 55.1 % (ref 41–71)
NITRITE UR-MCNC: NEGATIVE MG/ML
PH UR: 6 [PH] (ref 5–8)
PLATELET # BLD AUTO: 213 10*3/MM3 (ref 150–450)
PMV BLD AUTO: 12 FL (ref 6–12)
POTASSIUM BLD-SCNC: 4.1 MMOL/L (ref 3.5–5.5)
PROT SERPL-MCNC: 6.8 G/DL (ref 5.7–8.2)
PROT UR STRIP-MCNC: NEGATIVE MG/DL
RBC # BLD AUTO: 4.65 10*6/MM3 (ref 3.89–5.14)
RBC # UR STRIP: NEGATIVE /UL
SODIUM BLD-SCNC: 139 MMOL/L (ref 132–146)
SP GR UR: 1.02 (ref 1–1.03)
TIBC SERPL-MCNC: 357 MCG/DL (ref 250–450)
TRIGL SERPL-MCNC: 455 MG/DL (ref 0–150)
TSH SERPL DL<=0.05 MIU/L-ACNC: 2.4 MIU/ML (ref 0.35–5.35)
UROBILINOGEN UR QL: NORMAL
WBC NRBC COR # BLD: 5.62 10*3/MM3 (ref 3.5–10.8)

## 2018-05-23 PROCEDURE — 85025 COMPLETE CBC W/AUTO DIFF WBC: CPT | Performed by: INTERNAL MEDICINE

## 2018-05-23 PROCEDURE — 84443 ASSAY THYROID STIM HORMONE: CPT | Performed by: INTERNAL MEDICINE

## 2018-05-23 PROCEDURE — 90471 IMMUNIZATION ADMIN: CPT | Performed by: INTERNAL MEDICINE

## 2018-05-23 PROCEDURE — 90715 TDAP VACCINE 7 YRS/> IM: CPT | Performed by: INTERNAL MEDICINE

## 2018-05-23 PROCEDURE — 99396 PREV VISIT EST AGE 40-64: CPT | Performed by: INTERNAL MEDICINE

## 2018-05-23 PROCEDURE — 83550 IRON BINDING TEST: CPT | Performed by: INTERNAL MEDICINE

## 2018-05-23 PROCEDURE — 80053 COMPREHEN METABOLIC PANEL: CPT | Performed by: INTERNAL MEDICINE

## 2018-05-23 PROCEDURE — 80061 LIPID PANEL: CPT | Performed by: INTERNAL MEDICINE

## 2018-05-23 PROCEDURE — 81003 URINALYSIS AUTO W/O SCOPE: CPT | Performed by: INTERNAL MEDICINE

## 2018-05-23 PROCEDURE — 82728 ASSAY OF FERRITIN: CPT | Performed by: INTERNAL MEDICINE

## 2018-05-23 PROCEDURE — 83540 ASSAY OF IRON: CPT | Performed by: INTERNAL MEDICINE

## 2018-05-23 RX ORDER — RABEPRAZOLE SODIUM 20 MG/1
20 TABLET, DELAYED RELEASE ORAL DAILY
Qty: 90 TABLET | Refills: 3 | Status: SHIPPED | OUTPATIENT
Start: 2018-05-23 | End: 2019-06-03 | Stop reason: SDUPTHER

## 2018-05-29 ENCOUNTER — HOSPITAL ENCOUNTER (OUTPATIENT)
Dept: ULTRASOUND IMAGING | Facility: HOSPITAL | Age: 63
Discharge: HOME | End: 2018-05-29
Attending: INTERNAL MEDICINE | Admitting: INTERNAL MEDICINE

## 2018-05-29 PROCEDURE — 76536 US EXAM OF HEAD AND NECK: CPT

## 2018-06-01 ENCOUNTER — TELEPHONE (OUTPATIENT)
Dept: INTERNAL MEDICINE | Facility: CLINIC | Age: 63
End: 2018-06-01

## 2018-06-01 RX ORDER — FENOFIBRATE 160 MG/1
160 TABLET ORAL DAILY
Qty: 30 TABLET | Refills: 2 | Status: SHIPPED | OUTPATIENT
Start: 2018-06-01 | End: 2018-09-02 | Stop reason: SDUPTHER

## 2018-06-01 NOTE — TELEPHONE ENCOUNTER
I think the fenofibrate would work better. Also work on diet - cut back on sweets, fried foods, red meat

## 2018-06-01 NOTE — TELEPHONE ENCOUNTER
----- Message from Waleska Miller MD sent at 6/1/2018 10:22 AM EDT -----  Can you let her know, the ultrasound is showing several probable lymph nodes on both sides of the neck. I would like to recheck an ultrasound in 3 months and see if they are shrinking. If she feels like the large one on the left gets any bigger before then, just give us a call.   Her triglycerides are still very high so I would like to start fenofibrate to get these down. If she could schedule a f/u w/ me in 3 months and come in fasting. We will recheck labs, reexamine neck, and get US scheduled

## 2018-09-04 RX ORDER — FENOFIBRATE 160 MG/1
TABLET ORAL
Qty: 30 TABLET | Refills: 0 | Status: SHIPPED | OUTPATIENT
Start: 2018-09-04 | End: 2018-10-07 | Stop reason: SDUPTHER

## 2018-09-05 ENCOUNTER — TELEPHONE (OUTPATIENT)
Dept: INTERNAL MEDICINE | Facility: CLINIC | Age: 63
End: 2018-09-05

## 2018-10-03 ENCOUNTER — TELEPHONE (OUTPATIENT)
Dept: INTERNAL MEDICINE | Facility: CLINIC | Age: 63
End: 2018-10-03

## 2018-10-03 DIAGNOSIS — E78.00 PURE HYPERCHOLESTEROLEMIA: Primary | ICD-10-CM

## 2018-10-03 NOTE — TELEPHONE ENCOUNTER
----- Message from Waleska Miller MD sent at 10/3/2018  7:37 AM EDT -----  Regarding: appt  She is due for appt w/ me and fasting labs if she can come in

## 2018-10-08 RX ORDER — FENOFIBRATE 160 MG/1
TABLET ORAL
Qty: 30 TABLET | Refills: 0 | Status: SHIPPED | OUTPATIENT
Start: 2018-10-08 | End: 2018-10-12 | Stop reason: SDUPTHER

## 2018-10-09 ENCOUNTER — LAB (OUTPATIENT)
Dept: INTERNAL MEDICINE | Facility: CLINIC | Age: 63
End: 2018-10-09

## 2018-10-09 DIAGNOSIS — E78.00 PURE HYPERCHOLESTEROLEMIA: ICD-10-CM

## 2018-10-09 LAB
ALBUMIN SERPL-MCNC: 4.07 G/DL (ref 3.2–4.8)
ALBUMIN/GLOB SERPL: 2.2 G/DL (ref 1.5–2.5)
ALP SERPL-CCNC: 38 U/L (ref 25–100)
ALT SERPL W P-5'-P-CCNC: 28 U/L (ref 7–40)
ANION GAP SERPL CALCULATED.3IONS-SCNC: 4 MMOL/L (ref 3–11)
ARTICHOKE IGE QN: 70 MG/DL (ref 0–130)
AST SERPL-CCNC: 36 U/L (ref 0–33)
BASOPHILS # BLD AUTO: 0.03 10*3/MM3 (ref 0–0.2)
BASOPHILS NFR BLD AUTO: 0.5 % (ref 0–1)
BILIRUB SERPL-MCNC: 0.4 MG/DL (ref 0.3–1.2)
BUN BLD-MCNC: 13 MG/DL (ref 9–23)
BUN/CREAT SERPL: 20 (ref 7–25)
CALCIUM SPEC-SCNC: 9 MG/DL (ref 8.7–10.4)
CHLORIDE SERPL-SCNC: 106 MMOL/L (ref 99–109)
CHOLEST SERPL-MCNC: 142 MG/DL (ref 0–200)
CO2 SERPL-SCNC: 28 MMOL/L (ref 20–31)
CREAT BLD-MCNC: 0.65 MG/DL (ref 0.6–1.3)
DEPRECATED RDW RBC AUTO: 41.8 FL (ref 37–54)
EOSINOPHIL # BLD AUTO: 0.09 10*3/MM3 (ref 0–0.3)
EOSINOPHIL NFR BLD AUTO: 1.6 % (ref 0–3)
ERYTHROCYTE [DISTWIDTH] IN BLOOD BY AUTOMATED COUNT: 13 % (ref 11.3–14.5)
GFR SERPL CREATININE-BSD FRML MDRD: 93 ML/MIN/1.73
GLOBULIN UR ELPH-MCNC: 1.8 GM/DL
GLUCOSE BLD-MCNC: 82 MG/DL (ref 70–100)
HCT VFR BLD AUTO: 34.5 % (ref 34.5–44)
HDLC SERPL-MCNC: 63 MG/DL (ref 40–60)
HGB BLD-MCNC: 11.1 G/DL (ref 11.5–15.5)
IMM GRANULOCYTES # BLD: 0.01 10*3/MM3 (ref 0–0.03)
IMM GRANULOCYTES NFR BLD: 0.2 % (ref 0–0.6)
LYMPHOCYTES # BLD AUTO: 1.9 10*3/MM3 (ref 0.6–4.8)
LYMPHOCYTES NFR BLD AUTO: 33.1 % (ref 24–44)
MCH RBC QN AUTO: 28.4 PG (ref 27–31)
MCHC RBC AUTO-ENTMCNC: 32.2 G/DL (ref 32–36)
MCV RBC AUTO: 88.2 FL (ref 80–99)
MONOCYTES # BLD AUTO: 0.45 10*3/MM3 (ref 0–1)
MONOCYTES NFR BLD AUTO: 7.8 % (ref 0–12)
NEUTROPHILS # BLD AUTO: 3.27 10*3/MM3 (ref 1.5–8.3)
NEUTROPHILS NFR BLD AUTO: 57 % (ref 41–71)
PLATELET # BLD AUTO: 246 10*3/MM3 (ref 150–450)
PMV BLD AUTO: 11.6 FL (ref 6–12)
POTASSIUM BLD-SCNC: 3.7 MMOL/L (ref 3.5–5.5)
PROT SERPL-MCNC: 5.9 G/DL (ref 5.7–8.2)
RBC # BLD AUTO: 3.91 10*6/MM3 (ref 3.89–5.14)
SODIUM BLD-SCNC: 138 MMOL/L (ref 132–146)
TRIGL SERPL-MCNC: 71 MG/DL (ref 0–150)
WBC NRBC COR # BLD: 5.74 10*3/MM3 (ref 3.5–10.8)

## 2018-10-09 PROCEDURE — 80053 COMPREHEN METABOLIC PANEL: CPT | Performed by: INTERNAL MEDICINE

## 2018-10-09 PROCEDURE — 80061 LIPID PANEL: CPT | Performed by: INTERNAL MEDICINE

## 2018-10-09 PROCEDURE — 85025 COMPLETE CBC W/AUTO DIFF WBC: CPT | Performed by: INTERNAL MEDICINE

## 2018-10-11 PROBLEM — E78.00 PURE HYPERCHOLESTEROLEMIA: Status: ACTIVE | Noted: 2017-02-10

## 2018-10-11 NOTE — PROGRESS NOTES
Subjective   Jyoti Puga is a 63 y.o. female.     History of Present Illness   Here for f/u on:    Hypertriglyceridemia - TG had been in the 400s in May. We started fenofibrate and recheck in the 70s. She is trying to work on diet nad is more active now, working at [employer]    Neck mass - in May she had a 2cm LN on left lateral neck. We did US which showed LNs B, largest being 2 x 0.9 x 2 cm w/ some cystic characteristics. Radiologist thought they were probably reactive, but recommended f/u imaging (US or CT).  She thinks it has lessened some in size. Has not notcied any LAD elsewhere. Feels ok - no nightsweats, no decrease in appetite. Wt is down but she has been trying to lose    L foot pain in top of foot primarily - she had an injury where a scooter fell on her foot 3 yrs ago. She has seen 3 podiatrists and one of them told she had arthritis. One was going to get her an orthotic, but she never heard back. She is working at [employer] now  Not much pain in R foot, but has noticed that there is numbness/ tingling in both feer in the toes. No tingling in her fingers.  She has tried tylenol, but does not help. She is on B12 daily and B12 was good last year. She used to drink more heavily, but not in last 20 yrs, drinks some wine now but not daily.    fe def - she is one Fe a day. Had been off for a few months, but back on.       The following portions of the patient's history were reviewed and updated as appropriate: allergies, current medications, past family history, past medical history, past social history, past surgical history and problem list.    Review of Systems   Constitutional: Positive for activity change and unexpected weight loss. Negative for appetite change and unexpected weight gain.   Respiratory: Negative for shortness of breath.    Cardiovascular: Negative for chest pain and leg swelling.   Musculoskeletal: Positive for arthralgias.   Allergic/Immunologic: Negative for immunocompromised  state.   Neurological: Positive for numbness (B toes).   Psychiatric/Behavioral: Positive for sleep disturbance (RLS intermittently).       Objective   Physical Exam  Results for orders placed or performed in visit on 10/09/18   Comprehensive Metabolic Panel   Result Value Ref Range    Glucose 82 70 - 100 mg/dL    BUN 13 9 - 23 mg/dL    Creatinine 0.65 0.60 - 1.30 mg/dL    Sodium 138 132 - 146 mmol/L    Potassium 3.7 3.5 - 5.5 mmol/L    Chloride 106 99 - 109 mmol/L    CO2 28.0 20.0 - 31.0 mmol/L    Calcium 9.0 8.7 - 10.4 mg/dL    Total Protein 5.9 5.7 - 8.2 g/dL    Albumin 4.07 3.20 - 4.80 g/dL    ALT (SGPT) 28 7 - 40 U/L    AST (SGOT) 36 (H) 0 - 33 U/L    Alkaline Phosphatase 38 25 - 100 U/L    Total Bilirubin 0.4 0.3 - 1.2 mg/dL    eGFR Non African Amer 93 >60 mL/min/1.73    Globulin 1.8 gm/dL    A/G Ratio 2.2 1.5 - 2.5 g/dL    BUN/Creatinine Ratio 20.0 7.0 - 25.0    Anion Gap 4.0 3.0 - 11.0 mmol/L   Lipid Panel   Result Value Ref Range    Total Cholesterol 142 0 - 200 mg/dL    Triglycerides 71 0 - 150 mg/dL    HDL Cholesterol 63 (H) 40 - 60 mg/dL    LDL Cholesterol  70 0 - 130 mg/dL   CBC Auto Differential   Result Value Ref Range    WBC 5.74 3.50 - 10.80 10*3/mm3    RBC 3.91 3.89 - 5.14 10*6/mm3    Hemoglobin 11.1 (L) 11.5 - 15.5 g/dL    Hematocrit 34.5 34.5 - 44.0 %    MCV 88.2 80.0 - 99.0 fL    MCH 28.4 27.0 - 31.0 pg    MCHC 32.2 32.0 - 36.0 g/dL    RDW 13.0 11.3 - 14.5 %    RDW-SD 41.8 37.0 - 54.0 fl    MPV 11.6 6.0 - 12.0 fL    Platelets 246 150 - 450 10*3/mm3       Assessment/Plan   Jyoti was seen today for foot pain, back pain and results.    Diagnoses and all orders for this visit:    Pure hypercholesterolemia  Comments:  much better w/ fenofibrate. continue healthy diet/exercise. recheck in 6m  Orders:  -     fenofibrate 160 MG tablet; Take 1 tablet by mouth Daily.    Estrada's esophagus without dysplasia  Comments:  egd due 3/19 w/ Lori    Swelling of lymph nodes  -     US Head Neck Soft  Tissue; Future    Paresthesia of foot, bilateral  -     EMG & Nerve Conduction Test; Future        She declines flu vaccine

## 2018-10-12 ENCOUNTER — OFFICE VISIT (OUTPATIENT)
Dept: INTERNAL MEDICINE | Facility: CLINIC | Age: 63
End: 2018-10-12

## 2018-10-12 VITALS
HEART RATE: 61 BPM | SYSTOLIC BLOOD PRESSURE: 128 MMHG | OXYGEN SATURATION: 98 % | HEIGHT: 66 IN | TEMPERATURE: 99.1 F | BODY MASS INDEX: 34.42 KG/M2 | DIASTOLIC BLOOD PRESSURE: 78 MMHG | WEIGHT: 214.2 LBS

## 2018-10-12 DIAGNOSIS — R59.9 SWELLING OF LYMPH NODES: ICD-10-CM

## 2018-10-12 DIAGNOSIS — E78.00 PURE HYPERCHOLESTEROLEMIA: Primary | ICD-10-CM

## 2018-10-12 DIAGNOSIS — K22.70 BARRETT'S ESOPHAGUS WITHOUT DYSPLASIA: ICD-10-CM

## 2018-10-12 DIAGNOSIS — R20.2 PARESTHESIA OF FOOT, BILATERAL: ICD-10-CM

## 2018-10-12 PROCEDURE — 99214 OFFICE O/P EST MOD 30 MIN: CPT | Performed by: INTERNAL MEDICINE

## 2018-10-12 RX ORDER — FENOFIBRATE 160 MG/1
160 TABLET ORAL DAILY
Qty: 30 TABLET | Refills: 5 | Status: SHIPPED | OUTPATIENT
Start: 2018-10-12 | End: 2018-10-15 | Stop reason: SDUPTHER

## 2018-10-15 ENCOUNTER — TELEPHONE (OUTPATIENT)
Dept: INTERNAL MEDICINE | Facility: CLINIC | Age: 63
End: 2018-10-15

## 2018-10-15 DIAGNOSIS — E78.00 PURE HYPERCHOLESTEROLEMIA: ICD-10-CM

## 2018-10-15 RX ORDER — FENOFIBRATE 160 MG/1
160 TABLET ORAL DAILY
Qty: 30 TABLET | Refills: 5 | Status: SHIPPED | OUTPATIENT
Start: 2018-10-15 | End: 2019-06-08 | Stop reason: SDUPTHER

## 2018-10-15 NOTE — TELEPHONE ENCOUNTER
Noted. Medication was resent electronically to pt's pharmacy. [facility].

## 2018-10-15 NOTE — TELEPHONE ENCOUNTER
Patient needs to have her fenofibrate 160 MG tablet sent again the pharmacy told her they did not get it.

## 2018-11-27 ENCOUNTER — HOSPITAL ENCOUNTER (OUTPATIENT)
Dept: NEUROLOGY | Facility: HOSPITAL | Age: 63
Discharge: HOME | End: 2018-11-27
Attending: INTERNAL MEDICINE | Admitting: INTERNAL MEDICINE

## 2018-11-27 ENCOUNTER — HOSPITAL ENCOUNTER (OUTPATIENT)
Dept: ULTRASOUND IMAGING | Facility: HOSPITAL | Age: 63
Discharge: HOME | End: 2018-11-27
Attending: INTERNAL MEDICINE

## 2018-11-27 DIAGNOSIS — R20.2 PARESTHESIA OF FOOT, BILATERAL: ICD-10-CM

## 2018-11-27 DIAGNOSIS — R59.9 SWELLING OF LYMPH NODES: ICD-10-CM

## 2018-11-27 PROCEDURE — 95912 NRV CNDJ TEST 11-12 STUDIES: CPT

## 2018-11-27 PROCEDURE — 95886 MUSC TEST DONE W/N TEST COMP: CPT

## 2018-11-27 PROCEDURE — 95910 NRV CNDJ TEST 7-8 STUDIES: CPT

## 2018-11-27 PROCEDURE — 76536 US EXAM OF HEAD AND NECK: CPT

## 2018-11-30 ENCOUNTER — TELEPHONE (OUTPATIENT)
Dept: INTERNAL MEDICINE | Facility: CLINIC | Age: 63
End: 2018-11-30

## 2018-12-05 DIAGNOSIS — R59.0 CERVICAL LYMPHADENOPATHY: Primary | ICD-10-CM

## 2018-12-12 ENCOUNTER — HOSPITAL ENCOUNTER (OUTPATIENT)
Dept: CT IMAGING | Facility: HOSPITAL | Age: 63
Discharge: HOME | End: 2018-12-12
Attending: INTERNAL MEDICINE | Admitting: INTERNAL MEDICINE

## 2018-12-12 DIAGNOSIS — R59.0 CERVICAL LYMPHADENOPATHY: ICD-10-CM

## 2018-12-12 PROCEDURE — 70491 CT SOFT TISSUE NECK W/DYE: CPT

## 2018-12-12 PROCEDURE — 82565 ASSAY OF CREATININE: CPT

## 2018-12-12 PROCEDURE — 25010000002 IOPAMIDOL 61 % SOLUTION: Performed by: INTERNAL MEDICINE

## 2018-12-12 RX ADMIN — IOPAMIDOL 75 ML: 612 INJECTION, SOLUTION INTRAVENOUS at 15:32

## 2018-12-13 LAB — CREAT BLDA-MCNC: 0.8 MG/DL (ref 0.6–1.3)

## 2018-12-19 ENCOUNTER — OFFICE VISIT (OUTPATIENT)
Dept: INTERNAL MEDICINE | Facility: CLINIC | Age: 63
End: 2018-12-19

## 2018-12-19 VITALS
HEIGHT: 66 IN | TEMPERATURE: 99.3 F | WEIGHT: 225.4 LBS | SYSTOLIC BLOOD PRESSURE: 126 MMHG | BODY MASS INDEX: 36.22 KG/M2 | DIASTOLIC BLOOD PRESSURE: 76 MMHG

## 2018-12-19 DIAGNOSIS — G62.9 NEUROPATHY: Primary | ICD-10-CM

## 2018-12-19 DIAGNOSIS — Z23 NEED FOR HEPATITIS A VACCINATION: ICD-10-CM

## 2018-12-19 DIAGNOSIS — M54.42 ACUTE MIDLINE LOW BACK PAIN WITH LEFT-SIDED SCIATICA: ICD-10-CM

## 2018-12-19 DIAGNOSIS — R59.0 LAD (LYMPHADENOPATHY), CERVICAL: ICD-10-CM

## 2018-12-19 DIAGNOSIS — D50.8 OTHER IRON DEFICIENCY ANEMIA: ICD-10-CM

## 2018-12-19 LAB
BASOPHILS # BLD AUTO: 0.03 10*3/MM3 (ref 0–0.2)
BASOPHILS NFR BLD AUTO: 0.5 % (ref 0–1)
DEPRECATED RDW RBC AUTO: 45.6 FL (ref 37–54)
EOSINOPHIL # BLD AUTO: 0.16 10*3/MM3 (ref 0–0.3)
EOSINOPHIL NFR BLD AUTO: 2.7 % (ref 0–3)
ERYTHROCYTE [DISTWIDTH] IN BLOOD BY AUTOMATED COUNT: 13.6 % (ref 11.3–14.5)
HCT VFR BLD AUTO: 40.8 % (ref 34.5–44)
HGB BLD-MCNC: 13 G/DL (ref 11.5–15.5)
IMM GRANULOCYTES # BLD: 0.01 10*3/MM3 (ref 0–0.03)
IMM GRANULOCYTES NFR BLD: 0.2 % (ref 0–0.6)
LYMPHOCYTES # BLD AUTO: 1.77 10*3/MM3 (ref 0.6–4.8)
LYMPHOCYTES NFR BLD AUTO: 29.4 % (ref 24–44)
MCH RBC QN AUTO: 29.4 PG (ref 27–31)
MCHC RBC AUTO-ENTMCNC: 31.9 G/DL (ref 32–36)
MCV RBC AUTO: 92.3 FL (ref 80–99)
MONOCYTES # BLD AUTO: 0.45 10*3/MM3 (ref 0–1)
MONOCYTES NFR BLD AUTO: 7.5 % (ref 0–12)
NEUTROPHILS # BLD AUTO: 3.62 10*3/MM3 (ref 1.5–8.3)
NEUTROPHILS NFR BLD AUTO: 59.9 % (ref 41–71)
PLATELET # BLD AUTO: 296 10*3/MM3 (ref 150–450)
PMV BLD AUTO: 11.4 FL (ref 6–12)
RBC # BLD AUTO: 4.42 10*6/MM3 (ref 3.89–5.14)
VIT B12 BLD-MCNC: 1030 PG/ML (ref 211–911)
WBC NRBC COR # BLD: 6.03 10*3/MM3 (ref 3.5–10.8)

## 2018-12-19 PROCEDURE — 86038 ANTINUCLEAR ANTIBODIES: CPT | Performed by: INTERNAL MEDICINE

## 2018-12-19 PROCEDURE — 84156 ASSAY OF PROTEIN URINE: CPT | Performed by: INTERNAL MEDICINE

## 2018-12-19 PROCEDURE — 99214 OFFICE O/P EST MOD 30 MIN: CPT | Performed by: INTERNAL MEDICINE

## 2018-12-19 PROCEDURE — 84166 PROTEIN E-PHORESIS/URINE/CSF: CPT | Performed by: INTERNAL MEDICINE

## 2018-12-19 PROCEDURE — 90632 HEPA VACCINE ADULT IM: CPT | Performed by: INTERNAL MEDICINE

## 2018-12-19 PROCEDURE — 85025 COMPLETE CBC W/AUTO DIFF WBC: CPT | Performed by: INTERNAL MEDICINE

## 2018-12-19 PROCEDURE — 90471 IMMUNIZATION ADMIN: CPT | Performed by: INTERNAL MEDICINE

## 2018-12-19 PROCEDURE — 84165 PROTEIN E-PHORESIS SERUM: CPT | Performed by: INTERNAL MEDICINE

## 2018-12-19 PROCEDURE — 82607 VITAMIN B-12: CPT | Performed by: INTERNAL MEDICINE

## 2018-12-19 PROCEDURE — 84155 ASSAY OF PROTEIN SERUM: CPT | Performed by: INTERNAL MEDICINE

## 2018-12-19 RX ORDER — GABAPENTIN 300 MG/1
CAPSULE ORAL
Qty: 30 CAPSULE | Refills: 5 | Status: SHIPPED | OUTPATIENT
Start: 2018-12-19 | End: 2019-06-03

## 2018-12-19 NOTE — PROGRESS NOTES
"Subjective   Jyoti Puga is a 63 y.o. female.     History of Present Illness     Here for f/u on:     left foot pain -  She had NCV/EMG done which showed:  - Peripheral neuropathy, likely axonal, mild  - Prolonged H reflex on left is suggestive for a S1 radiculopathy versus sciatic nerve neuropathy on the left (mild)  She has noticed more back pain that radiates into left leg past knee at times. Pain can be up to 7/10. Takes excedrin sometimes, but not often. Did see chiropractic in past, but did not help that much.  Never had to have MRI of back, though chiropractor did do xrs.  She is not diabetic. She takes Bcomplex regularly. Used to drink some ETOh on weekends mostly, but less now. Was never a very heavy drinker    Cervical LAD - she had CT scan of the neck that showed B cervical LNs that the radiologist felt were reactive. She does still feel the large one on left, but feels it has gotten smaller. She soes have allergy/sinus symptoms, and has restarted allegra and using neti pot and has helped some w/ her sinus drainage    She is working 3rd shift now    The following portions of the patient's history were reviewed and updated as appropriate: allergies, current medications, past family history, past medical history, past social history, past surgical history and problem list.    Review of Systems   Constitutional: Positive for unexpected weight gain. Negative for activity change, appetite change and unexpected weight loss.   Musculoskeletal: Positive for back pain and myalgias.   Allergic/Immunologic: Positive for environmental allergies. Negative for immunocompromised state.   Neurological: Positive for numbness.   Psychiatric/Behavioral: Positive for sleep disturbance (working night shift so some trouble falling asleep).       Objective    Vitals:    12/19/18 1015   BP: 126/76   BP Location: Left arm   Temp: 99.3 °F (37.4 °C)   TempSrc: Temporal   Weight: 102 kg (225 lb 6.4 oz)   Height: 167.6 cm (66\") "     Physical Exam   Constitutional: She is oriented to person, place, and time. She appears well-developed and well-nourished. No distress.   HENT:   Head: Normocephalic and atraumatic.   Mouth/Throat: No oropharyngeal exudate.   Eyes: EOM are normal. Pupils are equal, round, and reactive to light.   Neck: Normal range of motion. Neck supple.   Cardiovascular: Normal rate and regular rhythm.   Pulmonary/Chest: Effort normal and breath sounds normal.   Abdominal: Soft. Bowel sounds are normal.   Musculoskeletal: Normal range of motion. She exhibits no edema, tenderness or deformity.   +SLR L at 60 degrees   Lymphadenopathy:     She has cervical adenopathy (B - on left is the largest LN).   Neurological: She is alert and oriented to person, place, and time. She displays normal reflexes. No cranial nerve deficit.   Skin: Skin is warm and dry. No rash noted. She is not diaphoretic.   Psychiatric: She has a normal mood and affect. Her behavior is normal. Judgment and thought content normal.         Assessment/Plan   Jyoti was seen today for results and numbness.    Diagnoses and all orders for this visit:    Neuropathy peripheral by EMG/NCV - will check some additional labs and we will go ahead and start gabapentin. Will have her f/u in 1m and see how it is doing  -     Vitamin B12  -     Nuclear Antigen Antibody, IFA  -     Protein Elec + Interp, Serum  -     Protein Electrophoresis, Random Urine - Urine, Clean Catch  -     CBC & Differential  -     gabapentin (NEURONTIN) 300 MG capsule; 1 before bed  -     CBC Auto Differential    Other iron deficiency anemia  -     CBC & Differential  -     CBC Auto Differential    Need for hepatitis A vaccination  -     Hepatitis A Vaccine Adult IM    Acute midline low back pain with left-sided sciatica - will check plain xr. Will probably start some PT. Gabapentin should help some too  -     XR Spine Lumbar 2 or 3 View; Future    LAD (lymphadenopathy), cervical - radiologist feels  they are reactive, but left sided is large. Will have her see ENT for opinion  -     Ambulatory Referral to ENT (Otolaryngology)        Prev - she declines flu vaccine

## 2018-12-20 LAB
ALBUMIN SERPL-MCNC: 4.4 G/DL (ref 2.9–4.4)
ALBUMIN/GLOB SERPL: 1.8 {RATIO} (ref 0.7–1.7)
ALPHA1 GLOB FLD ELPH-MCNC: 0.2 G/DL (ref 0–0.4)
ALPHA2 GLOB SERPL ELPH-MCNC: 0.5 G/DL (ref 0.4–1)
ANA SER QL IA: NEGATIVE
B-GLOBULIN SERPL ELPH-MCNC: 1 G/DL (ref 0.7–1.3)
GAMMA GLOB SERPL ELPH-MCNC: 0.8 G/DL (ref 0.4–1.8)
GLOBULIN SER CALC-MCNC: 2.5 G/DL (ref 2.2–3.9)
Lab: ABNORMAL
M-SPIKE: ABNORMAL G/DL
PROT PATTERN SERPL ELPH-IMP: ABNORMAL
PROT SERPL-MCNC: 6.9 G/DL (ref 6–8.5)

## 2018-12-21 LAB
ALBUMIN 24H MFR UR ELPH: 29.5 %
ALPHA1 GLOB 24H MFR UR ELPH: 5.8 %
ALPHA2 GLOB 24H MFR UR ELPH: 13 %
B-GLOBULIN MFR UR ELPH: 27.3 %
GAMMA GLOB 24H MFR UR ELPH: 24.3 %
Lab: NORMAL
M-SPIKE, UR: NORMAL %
PROT UR-MCNC: 9.9 MG/DL

## 2019-01-07 ENCOUNTER — TELEPHONE (OUTPATIENT)
Dept: INTERNAL MEDICINE | Facility: CLINIC | Age: 64
End: 2019-01-07

## 2019-01-07 NOTE — TELEPHONE ENCOUNTER
Pt states she did not receive a call about the ENT appointment.  Looks like they send her an email through my chart and she does not get on it very often b/c she works third shift.  She did not get the x-ray of her back.  She does not want to do either at this time.  She is getting ready to go to [employer] and will try to schedule at that time.

## 2019-01-07 NOTE — TELEPHONE ENCOUNTER
----- Message from Waleska Miller MD sent at 1/7/2019  7:37 AM EST -----  Regarding: ENT appt and back xr  Can you check to see if shaquille went to ENT appt - it looks like referral coordinator scheduled her 12/31 w/ Dr Chaney.  Also, I had put in xray order for her back - is she going to go get this?

## 2019-01-15 NOTE — PROGRESS NOTES
"Subjective   Jyoti Puga is a 63 y.o. female.     History of Present Illness   Here for f/u on:      left foot pain -  She had NCV/EMG done which showed peripheral neuropathy and left S1 radiculopathy versus sciatic nerve neuropathy. Last month we started gabaepntin 300 1 qhs and she does feel like it has helped - less pain, though still has the numbness  Workup for neuropathy has been negative.     Back pain - I had ordered back xr but she has not done yet. She has been trying to do some regualr exercises for her back and she feels these are helping, so has wanted to hold on the xr    Cervical LAD - she had CT scan of the neck that showed B cervical LNs that the radiologist felt were reactive. Because of size of these, I had wanted her to see ENT for opinion but she wasn't aware appt had been made so did not go. However, she does feel the noticeable one on the left is smaller. Does not hurt.     The following portions of the patient's history were reviewed and updated as appropriate: allergies, current medications, past family history, past medical history, past social history, past surgical history and problem list.    Review of Systems   Constitutional: Positive for activity change (has been doing more exercises) and unexpected weight loss. Negative for appetite change (trying to eat better), fatigue and unexpected weight gain.   Musculoskeletal: Positive for arthralgias (feet) and back pain.   Neurological: Positive for numbness.       Objective    Vitals:    01/16/19 0935   BP: 108/72   BP Location: Left arm   Temp: 98.4 °F (36.9 °C)   TempSrc: Temporal   Weight: 99.6 kg (219 lb 9.6 oz)   Height: 167.6 cm (66\")     Physical Exam  Constitutional: She is oriented to person, place, and time. She appears well-developed and well-nourished. No distress.   HENT:   Head: Normocephalic and atraumatic.   Mouth/Throat: No oropharyngeal exudate.   Eyes: EOM are normal. Pupils are equal, round, and reactive to light. "   Neck: Normal range of motion. Neck supple.   Cardiovascular: Normal rate and regular rhythm.   Pulmonary/Chest: Effort normal and breath sounds normal.   Abdominal: Soft. Bowel sounds are normal.   Musculoskeletal: Normal range of motion. She exhibits no edema, tenderness or deformity. no lumbar tenderness  Lymphadenopathy:     She has cervical adenopathy ~ 1cm on left  Neurological: She is alert and oriented to person, place, and time. She displays normal reflexes. No cranial nerve deficit.   Skin: Skin is warm and dry. No rash noted. She is not diaphoretic.   Psychiatric: She has a normal mood and affect. Her behavior is normal. Judgment and thought content normal.    Assessment/Plan   Jyoti was seen today for foot pain.    Diagnoses and all orders for this visit:    Neuropathy - neurontin 300 1 qd has helped. She wants to keep at this dose. She delcines doing xr lspine now as back is better    LAD (lymphadenopathy), cervical - has come down, and felt to be reactive on imaging. Will watch    Pure hypercholesterolemia  Comments:  FLP will be due in 4/19 - she has physical scheduled in May, so will due then    Cessation of tobacco use in previous 12 months - ct due in 2/19 - will schedule  -     CT Chest Low Dose Wo; Future      Prev - flu vaccine - she delcines  She got 1st hep A

## 2019-01-16 ENCOUNTER — OFFICE VISIT (OUTPATIENT)
Dept: INTERNAL MEDICINE | Facility: CLINIC | Age: 64
End: 2019-01-16

## 2019-01-16 VITALS
DIASTOLIC BLOOD PRESSURE: 72 MMHG | HEIGHT: 66 IN | BODY MASS INDEX: 35.29 KG/M2 | SYSTOLIC BLOOD PRESSURE: 108 MMHG | TEMPERATURE: 98.4 F | WEIGHT: 219.6 LBS

## 2019-01-16 DIAGNOSIS — R59.0 LAD (LYMPHADENOPATHY), CERVICAL: ICD-10-CM

## 2019-01-16 DIAGNOSIS — Z87.891 CESSATION OF TOBACCO USE IN PREVIOUS 12 MONTHS: ICD-10-CM

## 2019-01-16 DIAGNOSIS — E78.00 PURE HYPERCHOLESTEROLEMIA: ICD-10-CM

## 2019-01-16 DIAGNOSIS — G62.9 NEUROPATHY: Primary | ICD-10-CM

## 2019-01-16 PROCEDURE — 99214 OFFICE O/P EST MOD 30 MIN: CPT | Performed by: INTERNAL MEDICINE

## 2019-02-15 ENCOUNTER — HOSPITAL ENCOUNTER (OUTPATIENT)
Dept: CT IMAGING | Facility: HOSPITAL | Age: 64
Discharge: HOME | End: 2019-02-15
Admitting: INTERNAL MEDICINE

## 2019-02-15 DIAGNOSIS — Z87.891 CESSATION OF TOBACCO USE IN PREVIOUS 12 MONTHS: ICD-10-CM

## 2019-02-15 PROCEDURE — G0297 LDCT FOR LUNG CA SCREEN: HCPCS

## 2019-02-19 ENCOUNTER — TELEPHONE (OUTPATIENT)
Dept: INTERNAL MEDICINE | Facility: CLINIC | Age: 64
End: 2019-02-19

## 2019-02-19 NOTE — TELEPHONE ENCOUNTER
PN, she wants to know if that has anything to do why she has trouble with her breathing.  She said her gastro said she would have just as much trouble with having surgery on the hernia.

## 2019-02-19 NOTE — TELEPHONE ENCOUNTER
----- Message from Waleska Miller MD sent at 2/19/2019  1:13 PM EST -----  Can you let pt know the CT looks good overall - no signs of lung cancer. We will plan to repeat in 1 year for routine screening  The CT does show her large hiatal hernia too

## 2019-02-20 NOTE — TELEPHONE ENCOUNTER
We could have her see a surgeon (Dr Jordan) regarding the large hiatal hernia. I think it could be contributing to the SOB, so may be worth meeting with the surgeon and getting his opinion. If she is OK w/ it, I can put in referral for appt

## 2019-06-02 NOTE — PROGRESS NOTES
Here for physical    Exercise: She has been cleaning houses 3 days a week and getting more exercise with this  Diet: trying to eat healthy, though sometimes not as good. she is taking co-q10, b12 occasionally, D occasionally.      Chronic left foot pain -  throbs the more she is on it. She has seen several different podiatrists over last few years, and was told she had arthritis in the foot. She was given some cream, which didn't help. Then the last one wanted to give her a boot but she didn't get it and was still charged, so she did not want to go back.  Does have a history of remote trauma. She is taking 2 ibuprofen tid, 1 gabapentin at night -these help some but still quite a bit of pain.  We also have done a nerve conduction test which did which did show peripheral neuropathy and S1 radiculopathy.  She does have some back pain but she actually feels like it is doing better with the increase in physical activity.  I had ordered a back x-ray but she had declined doing this since the back pain was better.  Does not have pain down the left leg.    Had a fall yesterday - missed a step and fell forward and hit r wrist and nose - had a bloody nose.  Does have some swelling in the right thumb but movement is okay.    The following portions of the patient's history were reviewed and updated as appropriate: allergies, current medications, past family history, past medical history, past social history, past surgical history and problem list.    Review of Systems   Constitutional: Positive for activity change (has been doing more exercises) and unexpected weight gain. Negative for appetite change (trying to eat better), fatigue and unexpected weight loss.   Respiratory: Negative for shortness of breath.    Cardiovascular: Negative for chest pain.   Gastrointestinal: Positive for constipation (uses stool softener). Negative for diarrhea and GERD (aciphex works well).   Musculoskeletal: Positive for arthralgias (feet) and  "back pain.   Neurological: Positive for numbness.         Objective    /74 (BP Location: Right arm)   Pulse 63   Temp 97.1 °F (36.2 °C) (Temporal)   Ht 167 cm (65.75\")   Wt 101 kg (223 lb)   SpO2 100%   BMI 36.27 kg/m²   Physical Exam   Physical Exam   Constitutional: She is oriented to person, place, and time. She appears well-developed and well-nourished. No distress.   HENT:   Head: Normocephalic and atraumatic.   Right Ear: External ear normal.   Left Ear: External ear normal.   Nose: Nose normal.   Mouth/Throat: Oropharynx is clear and moist. No oropharyngeal exudate.   Eyes: Conjunctivae and EOM are normal. Pupils are equal, round, and reactive to light. Right eye exhibits no discharge. Left eye exhibits no discharge. No scleral icterus.   Neck: Normal range of motion. Neck supple. No thyromegaly present.   Cardiovascular: Normal rate, regular rhythm, normal heart sounds and intact distal pulses. Exam reveals no gallop and no friction rub.   No murmur heard.  Pulmonary/Chest: Effort normal and breath sounds normal. No respiratory distress. She has no wheezes. She has no rales. Right breast exhibits no mass, no nipple discharge, no skin change and no tenderness. Left breast exhibits no mass, no nipple discharge, no skin change and no tenderness.   Abdominal: Soft. Bowel sounds are normal. She exhibits no distension and no mass. There is no tenderness. There is no rebound and no guarding.   Musculoskeletal: Normal range of motion. She exhibits tenderness (r thumb, slight crepitus. left foot over dorsum, tender) and deformity (swelling over R thumbsome swelling over R thumb). She exhibits no edema.   Lymphadenopathy:     She has cervical adenopathy (cervical ln's have decreased in size since last visit).   Neurological: She is alert and oriented to person, place, and time. She displays normal reflexes. Coordination normal.   Skin: Skin is warm and dry. No rash noted. She is not diaphoretic. No " erythema. No pallor.   Psychiatric: She has a normal mood and affect. Her behavior is normal. Judgment and thought content normal.   Nursing note and vitals reviewed.        Assessment/Plan   Jyoti was seen today for annual exam.    Diagnoses and all orders for this visit:    Routine general medical examination at a health care facility      Regular exercise/healthy diet. BSE q month. Sunscreen use encouraged. calcium intake reviewed. Check fasting labs  Jong due now - SJ - she has scheduled it  Colon due in 3/22 (castallanos)  DT due 5/28  DEXA due 5/20 (normal in 5/17)  Shingles - shingrix discussed -  can check at pharmacy since we do not have   She had pneumovax (smoker) in '17, will give prevnar age 67  CT lung cancer screen due in 2/20  She does not need paps as she had hysterectomy for benign reasons  She will return for her second hepatitis A vaccine in 2 weeks  -     Lipid Panel  -     TSH  -     Vitamin D 25 Hydroxy  -     POC Urinalysis Dipstick, Automated  -     CBC Auto Differential    Estrada's esophagus without dysplasia  Comments:  last Endoscopy was 2017. she thinks the next one is due in '22 her colonoscopy    Pure hypercholesterolemia -fenofibrate.  Check levels today    Cessation of tobacco use in previous 12 months  Comments:  CT lung cancer will be due 2/20    Vitamin D deficiency -taking vitamin D irregularly  -     Vitamin D 25 Hydroxy    Chronic foot pain, left  Comments:  She has seen several different podiatrist but has not been happy with the care there.  We will go ahead and have her see Ortho.  She does have a peripheral neuropathy and S1 radiculopathy, but I do not think these would explain the throbbing pain.  Probably is a component of osteoarthritis as well.  We will also go ahead and raise the gabapentin to 600 mg at night.    Thumb pain, right  Comments:  Fall yesterday.  We discussed getting an x-ray but she declines.  She will ice and continue the ibuprofen.  Call if  worsens

## 2019-06-03 ENCOUNTER — OFFICE VISIT (OUTPATIENT)
Dept: INTERNAL MEDICINE | Facility: CLINIC | Age: 64
End: 2019-06-03

## 2019-06-03 VITALS
BODY MASS INDEX: 35.84 KG/M2 | HEIGHT: 66 IN | SYSTOLIC BLOOD PRESSURE: 100 MMHG | OXYGEN SATURATION: 100 % | DIASTOLIC BLOOD PRESSURE: 74 MMHG | WEIGHT: 223 LBS | TEMPERATURE: 97.1 F | HEART RATE: 63 BPM

## 2019-06-03 DIAGNOSIS — K22.70 BARRETT'S ESOPHAGUS WITHOUT DYSPLASIA: ICD-10-CM

## 2019-06-03 DIAGNOSIS — M79.644 THUMB PAIN, RIGHT: ICD-10-CM

## 2019-06-03 DIAGNOSIS — E55.9 VITAMIN D DEFICIENCY: ICD-10-CM

## 2019-06-03 DIAGNOSIS — M79.672 CHRONIC FOOT PAIN, LEFT: ICD-10-CM

## 2019-06-03 DIAGNOSIS — Z00.00 ROUTINE GENERAL MEDICAL EXAMINATION AT A HEALTH CARE FACILITY: Primary | ICD-10-CM

## 2019-06-03 DIAGNOSIS — G89.29 CHRONIC FOOT PAIN, LEFT: ICD-10-CM

## 2019-06-03 DIAGNOSIS — E78.00 PURE HYPERCHOLESTEROLEMIA: ICD-10-CM

## 2019-06-03 DIAGNOSIS — Z87.891 CESSATION OF TOBACCO USE IN PREVIOUS 12 MONTHS: ICD-10-CM

## 2019-06-03 DIAGNOSIS — G62.9 NEUROPATHY: ICD-10-CM

## 2019-06-03 LAB
25(OH)D3 SERPL-MCNC: 16.3 NG/ML (ref 30–100)
ALBUMIN SERPL-MCNC: 4.1 G/DL (ref 3.5–5.2)
ALBUMIN/GLOB SERPL: 1.5 G/DL
ALP SERPL-CCNC: 51 U/L (ref 39–117)
ALT SERPL W P-5'-P-CCNC: 32 U/L (ref 1–33)
ANION GAP SERPL CALCULATED.3IONS-SCNC: 12.4 MMOL/L
AST SERPL-CCNC: 49 U/L (ref 1–32)
BASOPHILS # BLD AUTO: 0.05 10*3/MM3 (ref 0–0.2)
BASOPHILS NFR BLD AUTO: 0.8 % (ref 0–1.5)
BILIRUB BLD-MCNC: NEGATIVE MG/DL
BILIRUB SERPL-MCNC: 0.6 MG/DL (ref 0.2–1.2)
BUN BLD-MCNC: 13 MG/DL (ref 8–23)
BUN/CREAT SERPL: 23.2 (ref 7–25)
CALCIUM SPEC-SCNC: 9.7 MG/DL (ref 8.6–10.5)
CHLORIDE SERPL-SCNC: 103 MMOL/L (ref 98–107)
CHOLEST SERPL-MCNC: 166 MG/DL (ref 0–200)
CLARITY, POC: CLEAR
CO2 SERPL-SCNC: 25.6 MMOL/L (ref 22–29)
COLOR UR: YELLOW
CREAT BLD-MCNC: 0.56 MG/DL (ref 0.57–1)
DEPRECATED RDW RBC AUTO: 44.7 FL (ref 37–54)
EOSINOPHIL # BLD AUTO: 0.14 10*3/MM3 (ref 0–0.4)
EOSINOPHIL NFR BLD AUTO: 2.4 % (ref 0.3–6.2)
ERYTHROCYTE [DISTWIDTH] IN BLOOD BY AUTOMATED COUNT: 13.1 % (ref 12.3–15.4)
GFR SERPL CREATININE-BSD FRML MDRD: 110 ML/MIN/1.73
GLOBULIN UR ELPH-MCNC: 2.8 GM/DL
GLUCOSE BLD-MCNC: 93 MG/DL (ref 65–99)
GLUCOSE UR STRIP-MCNC: NEGATIVE MG/DL
HCT VFR BLD AUTO: 38.5 % (ref 34–46.6)
HDLC SERPL-MCNC: 60 MG/DL (ref 40–60)
HGB BLD-MCNC: 12 G/DL (ref 12–15.9)
IMM GRANULOCYTES # BLD AUTO: 0.01 10*3/MM3 (ref 0–0.05)
IMM GRANULOCYTES NFR BLD AUTO: 0.2 % (ref 0–0.5)
KETONES UR QL: NEGATIVE
LDLC SERPL CALC-MCNC: 84 MG/DL (ref 0–100)
LDLC/HDLC SERPL: 1.4 {RATIO}
LEUKOCYTE EST, POC: NEGATIVE
LYMPHOCYTES # BLD AUTO: 1.32 10*3/MM3 (ref 0.7–3.1)
LYMPHOCYTES NFR BLD AUTO: 22.4 % (ref 19.6–45.3)
MCH RBC QN AUTO: 29.3 PG (ref 26.6–33)
MCHC RBC AUTO-ENTMCNC: 31.2 G/DL (ref 31.5–35.7)
MCV RBC AUTO: 93.9 FL (ref 79–97)
MONOCYTES # BLD AUTO: 0.48 10*3/MM3 (ref 0.1–0.9)
MONOCYTES NFR BLD AUTO: 8.1 % (ref 5–12)
NEUTROPHILS # BLD AUTO: 3.9 10*3/MM3 (ref 1.7–7)
NEUTROPHILS NFR BLD AUTO: 66.1 % (ref 42.7–76)
NITRITE UR-MCNC: NEGATIVE MG/ML
NRBC BLD AUTO-RTO: 0 /100 WBC (ref 0–0.2)
PH UR: 7 [PH] (ref 5–8)
PLATELET # BLD AUTO: 231 10*3/MM3 (ref 140–450)
PMV BLD AUTO: 12.3 FL (ref 6–12)
POTASSIUM BLD-SCNC: 4.2 MMOL/L (ref 3.5–5.2)
PROT SERPL-MCNC: 6.9 G/DL (ref 6–8.5)
PROT UR STRIP-MCNC: NEGATIVE MG/DL
RBC # BLD AUTO: 4.1 10*6/MM3 (ref 3.77–5.28)
RBC # UR STRIP: NEGATIVE /UL
SODIUM BLD-SCNC: 141 MMOL/L (ref 136–145)
SP GR UR: 1.02 (ref 1–1.03)
TRIGL SERPL-MCNC: 110 MG/DL (ref 0–150)
TSH SERPL DL<=0.05 MIU/L-ACNC: 1.99 MIU/ML (ref 0.27–4.2)
UROBILINOGEN UR QL: NORMAL
VLDLC SERPL-MCNC: 22 MG/DL (ref 5–40)
WBC NRBC COR # BLD: 5.9 10*3/MM3 (ref 3.4–10.8)

## 2019-06-03 PROCEDURE — 99396 PREV VISIT EST AGE 40-64: CPT | Performed by: INTERNAL MEDICINE

## 2019-06-03 PROCEDURE — 85025 COMPLETE CBC W/AUTO DIFF WBC: CPT | Performed by: INTERNAL MEDICINE

## 2019-06-03 PROCEDURE — 80053 COMPREHEN METABOLIC PANEL: CPT | Performed by: INTERNAL MEDICINE

## 2019-06-03 PROCEDURE — 80061 LIPID PANEL: CPT | Performed by: INTERNAL MEDICINE

## 2019-06-03 PROCEDURE — 82306 VITAMIN D 25 HYDROXY: CPT | Performed by: INTERNAL MEDICINE

## 2019-06-03 PROCEDURE — 84443 ASSAY THYROID STIM HORMONE: CPT | Performed by: INTERNAL MEDICINE

## 2019-06-03 PROCEDURE — 81003 URINALYSIS AUTO W/O SCOPE: CPT | Performed by: INTERNAL MEDICINE

## 2019-06-03 RX ORDER — RABEPRAZOLE SODIUM 20 MG/1
20 TABLET, DELAYED RELEASE ORAL DAILY
Qty: 90 TABLET | Refills: 3 | Status: SHIPPED | OUTPATIENT
Start: 2019-06-03 | End: 2020-05-27

## 2019-06-03 RX ORDER — GABAPENTIN 600 MG/1
TABLET ORAL
Qty: 90 TABLET | Refills: 1 | Status: SHIPPED | OUTPATIENT
Start: 2019-06-03 | End: 2019-09-05 | Stop reason: SDUPTHER

## 2019-06-08 DIAGNOSIS — E78.00 PURE HYPERCHOLESTEROLEMIA: ICD-10-CM

## 2019-06-08 RX ORDER — FENOFIBRATE 160 MG/1
160 TABLET ORAL DAILY
Qty: 30 TABLET | Refills: 5 | Status: SHIPPED | OUTPATIENT
Start: 2019-06-08 | End: 2020-02-21 | Stop reason: SDUPTHER

## 2019-06-08 RX ORDER — CHOLECALCIFEROL (VITAMIN D3) 1250 MCG
50000 CAPSULE ORAL
Qty: 4 CAPSULE | Refills: 2 | Status: SHIPPED | OUTPATIENT
Start: 2019-06-08 | End: 2020-10-14 | Stop reason: ALTCHOICE

## 2019-06-10 ENCOUNTER — TELEPHONE (OUTPATIENT)
Dept: INTERNAL MEDICINE | Facility: CLINIC | Age: 64
End: 2019-06-10

## 2019-06-10 NOTE — TELEPHONE ENCOUNTER
Pt states insurance is not paying for the Vitamin D3.  I have called [facility] to find out what is going on with the script.  The script is 1.62 and she can  at the pharmacy.  PN and will .

## 2019-06-10 NOTE — TELEPHONE ENCOUNTER
Patient called and said the vitamin D3 wasn't approved by insurance.  She is requesting a call at [phone]

## 2019-09-05 DIAGNOSIS — M79.672 CHRONIC FOOT PAIN, LEFT: ICD-10-CM

## 2019-09-05 DIAGNOSIS — G89.29 CHRONIC FOOT PAIN, LEFT: ICD-10-CM

## 2019-09-05 DIAGNOSIS — G62.9 NEUROPATHY: ICD-10-CM

## 2019-09-05 RX ORDER — GABAPENTIN 600 MG/1
TABLET ORAL
Qty: 90 TABLET | Refills: 0 | Status: SHIPPED | OUTPATIENT
Start: 2019-09-05 | End: 2020-02-13 | Stop reason: SDUPTHER

## 2019-10-31 ENCOUNTER — OFFICE VISIT (OUTPATIENT)
Dept: INTERNAL MEDICINE | Facility: CLINIC | Age: 64
End: 2019-10-31

## 2019-10-31 VITALS
SYSTOLIC BLOOD PRESSURE: 138 MMHG | TEMPERATURE: 99 F | DIASTOLIC BLOOD PRESSURE: 76 MMHG | BODY MASS INDEX: 35.65 KG/M2 | OXYGEN SATURATION: 98 % | WEIGHT: 221.8 LBS | HEART RATE: 77 BPM | HEIGHT: 66 IN

## 2019-10-31 DIAGNOSIS — Z23 NEED FOR HEPATITIS A IMMUNIZATION: ICD-10-CM

## 2019-10-31 DIAGNOSIS — F32.1 CURRENT MODERATE EPISODE OF MAJOR DEPRESSIVE DISORDER, UNSPECIFIED WHETHER RECURRENT (HCC): Primary | ICD-10-CM

## 2019-10-31 PROCEDURE — 99213 OFFICE O/P EST LOW 20 MIN: CPT | Performed by: INTERNAL MEDICINE

## 2019-10-31 PROCEDURE — 90471 IMMUNIZATION ADMIN: CPT | Performed by: INTERNAL MEDICINE

## 2019-10-31 PROCEDURE — 90632 HEPA VACCINE ADULT IM: CPT | Performed by: INTERNAL MEDICINE

## 2019-10-31 NOTE — PROGRESS NOTES
"Subjective   Jyoti Puga is a 64 y.o. female.     History of Present Illness     Here for f/u on:    Anxiety/depression - her brother has brain cancer and pt is under a lot of stress. She is finding that she is snapping at people. Stress w/ her family members especially.  She had been on zoloft in the past - worked well, maybe made her feel a little emotionless. She had seen a counselor years ago but not recently     Current Outpatient Medications on File Prior to Visit   Medication Sig Dispense Refill   • Cholecalciferol (VITAMIN D3) 52533 units capsule Take 1 capsule by mouth Every 7 (Seven) Days. 4 capsule 2   • fenofibrate 160 MG tablet Take 1 tablet by mouth Daily. 30 tablet 5   • gabapentin (NEURONTIN) 600 MG tablet TAKE 1 TABLET BY MOUTH EVERYDAY AT BEDTIME 90 tablet 0   • RABEprazole (ACIPHEX) 20 MG EC tablet Take 1 tablet by mouth Daily. 90 tablet 3     No current facility-administered medications on file prior to visit.        The following portions of the patient's history were reviewed and updated as appropriate: allergies, current medications, past family history, past medical history, past social history, past surgical history and problem list.    Review of Systems   Constitutional: Negative for activity change, appetite change, unexpected weight gain and unexpected weight loss.   Musculoskeletal: Positive for arthralgias (foot is better, but some LBP - seeing ortho).   Allergic/Immunologic: Negative for immunocompromised state.   Psychiatric/Behavioral: Positive for agitation, dysphoric mood, depressed mood and stress.       Objective   /76 (BP Location: Left arm)   Pulse 77   Temp 99 °F (37.2 °C) (Temporal)   Ht 167 cm (65.75\")   Wt 101 kg (221 lb 12.8 oz)   SpO2 98%   BMI 36.07 kg/m²   Physical Exam   Constitutional: She is oriented to person, place, and time. She appears well-developed and well-nourished. She appears distressed (pt tearful).   HENT:   Head: Normocephalic and " atraumatic.   Nose: Nose normal.   Eyes: Conjunctivae and EOM are normal. Pupils are equal, round, and reactive to light. Right eye exhibits no discharge. Left eye exhibits no discharge. No scleral icterus.   Neurological: She is alert and oriented to person, place, and time. No cranial nerve deficit.   Skin: Skin is warm and dry. No rash noted. She is not diaphoretic. No erythema.   Psychiatric: She has a normal mood and affect. Her behavior is normal. Judgment and thought content normal.   Nursing note and vitals reviewed.        Assessment/Plan   Jyoti was seen today for anxiety and depression.    Diagnoses and all orders for this visit:    Current moderate episode of major depressive disorder, unspecified whether recurrent (CMS/HCC) -we will go ahead and try Zoloft again.  Side effects reviewed.  Call if any problems.  She does have a follow-up in December so we can see how she is doing with that then.  I also gave her list of counselors as this will probably be helpful for her as well  -     sertraline (ZOLOFT) 50 MG tablet; 1/2 qd x 1 week, then increase to 1 qd    Need for hepatitis A immunization  -     Hepatitis A Vaccine Adult IM      She declines flu shot

## 2019-11-30 PROBLEM — F32.0 CURRENT MILD EPISODE OF MAJOR DEPRESSIVE DISORDER WITHOUT PRIOR EPISODE: Status: ACTIVE | Noted: 2019-11-30

## 2019-11-30 PROBLEM — E55.9 VITAMIN D DEFICIENCY: Status: ACTIVE | Noted: 2019-11-30

## 2020-02-13 ENCOUNTER — OFFICE VISIT (OUTPATIENT)
Dept: INTERNAL MEDICINE | Facility: CLINIC | Age: 65
End: 2020-02-13

## 2020-02-13 VITALS
WEIGHT: 238 LBS | SYSTOLIC BLOOD PRESSURE: 124 MMHG | TEMPERATURE: 97.5 F | DIASTOLIC BLOOD PRESSURE: 68 MMHG | HEIGHT: 66 IN | BODY MASS INDEX: 38.25 KG/M2 | OXYGEN SATURATION: 96 % | HEART RATE: 66 BPM

## 2020-02-13 DIAGNOSIS — R06.02 SOB (SHORTNESS OF BREATH): Primary | ICD-10-CM

## 2020-02-13 DIAGNOSIS — G62.9 NEUROPATHY: ICD-10-CM

## 2020-02-13 DIAGNOSIS — M79.672 CHRONIC FOOT PAIN, LEFT: ICD-10-CM

## 2020-02-13 DIAGNOSIS — E78.00 PURE HYPERCHOLESTEROLEMIA: ICD-10-CM

## 2020-02-13 DIAGNOSIS — Z87.891 PERSONAL HISTORY OF TOBACCO USE, PRESENTING HAZARDS TO HEALTH: ICD-10-CM

## 2020-02-13 DIAGNOSIS — G89.29 CHRONIC FOOT PAIN, LEFT: ICD-10-CM

## 2020-02-13 DIAGNOSIS — D50.8 OTHER IRON DEFICIENCY ANEMIA: ICD-10-CM

## 2020-02-13 LAB
ALBUMIN SERPL-MCNC: 4.1 G/DL (ref 3.5–5.2)
ALBUMIN/GLOB SERPL: 2 G/DL
ALP SERPL-CCNC: 42 U/L (ref 39–117)
ALT SERPL W P-5'-P-CCNC: 12 U/L (ref 1–33)
ANION GAP SERPL CALCULATED.3IONS-SCNC: 10.2 MMOL/L (ref 5–15)
AST SERPL-CCNC: 24 U/L (ref 1–32)
BILIRUB SERPL-MCNC: 0.3 MG/DL (ref 0.2–1.2)
BUN BLD-MCNC: 15 MG/DL (ref 8–23)
BUN/CREAT SERPL: 25 (ref 7–25)
CALCIUM SPEC-SCNC: 9.8 MG/DL (ref 8.6–10.5)
CHLORIDE SERPL-SCNC: 101 MMOL/L (ref 98–107)
CHOLEST SERPL-MCNC: 130 MG/DL (ref 0–200)
CO2 SERPL-SCNC: 28.8 MMOL/L (ref 22–29)
CREAT BLD-MCNC: 0.6 MG/DL (ref 0.57–1)
DEPRECATED RDW RBC AUTO: 44.6 FL (ref 37–54)
ERYTHROCYTE [DISTWIDTH] IN BLOOD BY AUTOMATED COUNT: 14.7 % (ref 12.3–15.4)
FERRITIN SERPL-MCNC: 13.5 NG/ML (ref 13–150)
GFR SERPL CREATININE-BSD FRML MDRD: 101 ML/MIN/1.73
GLOBULIN UR ELPH-MCNC: 2.1 GM/DL
GLUCOSE BLD-MCNC: 99 MG/DL (ref 65–99)
HCT VFR BLD AUTO: 28.2 % (ref 34–46.6)
HDLC SERPL-MCNC: 47 MG/DL (ref 40–60)
HGB BLD-MCNC: 8.5 G/DL (ref 12–15.9)
IRON 24H UR-MRATE: 220 MCG/DL (ref 37–145)
IRON SATN MFR SERPL: 39 % (ref 20–50)
LDLC SERPL CALC-MCNC: 64 MG/DL (ref 0–100)
LDLC/HDLC SERPL: 1.37 {RATIO}
MCH RBC QN AUTO: 25.4 PG (ref 26.6–33)
MCHC RBC AUTO-ENTMCNC: 30.1 G/DL (ref 31.5–35.7)
MCV RBC AUTO: 84.4 FL (ref 79–97)
PLATELET # BLD AUTO: 263 10*3/MM3 (ref 140–450)
PMV BLD AUTO: 12.2 FL (ref 6–12)
POTASSIUM BLD-SCNC: 4.3 MMOL/L (ref 3.5–5.2)
PROT SERPL-MCNC: 6.2 G/DL (ref 6–8.5)
RBC # BLD AUTO: 3.34 10*6/MM3 (ref 3.77–5.28)
SODIUM BLD-SCNC: 140 MMOL/L (ref 136–145)
TIBC SERPL-MCNC: 563 MCG/DL (ref 298–536)
TRANSFERRIN SERPL-MCNC: 378 MG/DL (ref 200–360)
TRIGL SERPL-MCNC: 94 MG/DL (ref 0–150)
TSH SERPL DL<=0.05 MIU/L-ACNC: 3.19 UIU/ML (ref 0.27–4.2)
VLDLC SERPL-MCNC: 18.8 MG/DL (ref 5–40)
WBC NRBC COR # BLD: 5.72 10*3/MM3 (ref 3.4–10.8)

## 2020-02-13 PROCEDURE — 84466 ASSAY OF TRANSFERRIN: CPT | Performed by: INTERNAL MEDICINE

## 2020-02-13 PROCEDURE — 84443 ASSAY THYROID STIM HORMONE: CPT | Performed by: INTERNAL MEDICINE

## 2020-02-13 PROCEDURE — 80053 COMPREHEN METABOLIC PANEL: CPT | Performed by: INTERNAL MEDICINE

## 2020-02-13 PROCEDURE — 83540 ASSAY OF IRON: CPT | Performed by: INTERNAL MEDICINE

## 2020-02-13 PROCEDURE — 82728 ASSAY OF FERRITIN: CPT | Performed by: INTERNAL MEDICINE

## 2020-02-13 PROCEDURE — 99214 OFFICE O/P EST MOD 30 MIN: CPT | Performed by: INTERNAL MEDICINE

## 2020-02-13 PROCEDURE — 85027 COMPLETE CBC AUTOMATED: CPT | Performed by: INTERNAL MEDICINE

## 2020-02-13 PROCEDURE — 80061 LIPID PANEL: CPT | Performed by: INTERNAL MEDICINE

## 2020-02-13 RX ORDER — FENOFIBRATE 160 MG/1
160 TABLET ORAL DAILY
Qty: 30 TABLET | Refills: 5 | Status: CANCELLED | OUTPATIENT
Start: 2020-02-13

## 2020-02-13 RX ORDER — GABAPENTIN 600 MG/1
TABLET ORAL
Qty: 90 TABLET | Refills: 0 | Status: CANCELLED | OUTPATIENT
Start: 2020-02-13

## 2020-02-13 RX ORDER — ALBUTEROL SULFATE 90 UG/1
2 AEROSOL, METERED RESPIRATORY (INHALATION) EVERY 4 HOURS PRN
Qty: 1 INHALER | Refills: 11 | Status: SHIPPED | OUTPATIENT
Start: 2020-02-13 | End: 2021-10-02 | Stop reason: SDUPTHER

## 2020-02-13 RX ORDER — GABAPENTIN 600 MG/1
TABLET ORAL
Qty: 180 TABLET | Refills: 0 | Status: SHIPPED | OUTPATIENT
Start: 2020-02-13 | End: 2020-07-15

## 2020-02-13 NOTE — PROGRESS NOTES
Subjective   Jyoti Puga is a 64 y.o. female.     History of Present Illness     Here for f/u on:    Hyperlipidemia - she is on fenofibrate and says she is taking regualrly, though looks like she would have run out in 11/19; last FLP was 6/19. Diet not as good recently and weight is up.   She has not eaten today, but did have a boost at 5am    Depression/anxiety - we had startred zoloft in the fall and she took x 3 months, but she stopped it as she didn't feel like she needed it anymore. Not as much stress. She is taking an OTC sleep aid that helps some. She does feel the leg pain keeps her up some. She is taking gabapentin bid - and helps some. She stopped caffiene    L foot pain - she has been seeing Dr Ferrair at [facility] and did receive an injection. She also has order for PT from them but has not done yet    SOB x 3-4 years- has been worse recently. Gets SOB if she does any exertion. Wheezes some, and will cough occasionally.   She does have large hiatal hernia  She has been using 2 pillows at night because of some orthopnea. No swelling. Did have stress test in '17 which was neg; the echo showed mild MR, EF normal  She feels like her reflux can trigger the wheezing  She saw an allergist a few years ago and did have pfts and skin testing and was told she had asthma and allergies. They wanted her to start allergy shots but she did not want to. She was given inhalers but did not think they helped  She did not have asthma as a child. Her allergies were to some foods and some trees.   She has had CXR in '15 and was neg. she did have CT lung cancer screen 1 yr ago that showed some scarring and atelectasis  She was a smoker, 1 1/2-2 ppd x years,did quit 5-6 yrs ago.     H/o anemia - She is on centrum silver. She did start chewing ice a few days ago.     GERD - she is on aciphex daily, and tries to watch diet too. Has had more junk food, and reflux has been a little worse    Current Outpatient Medications on File Prior  "to Visit   Medication Sig Dispense Refill   • Cholecalciferol (VITAMIN D3) 43413 units capsule Take 1 capsule by mouth Every 7 (Seven) Days. 4 capsule 2   • fenofibrate 160 MG tablet Take 1 tablet by mouth Daily. 30 tablet 5   • gabapentin (NEURONTIN) 600 MG tablet TAKE 1 TABLET BY MOUTH EVERYDAY AT BEDTIME 90 tablet 0   • RABEprazole (ACIPHEX) 20 MG EC tablet Take 1 tablet by mouth Daily. 90 tablet 3   • sertraline (ZOLOFT) 50 MG tablet 1/2 qd x 1 week, then increase to 1 qd 30 tablet 5     No current facility-administered medications on file prior to visit.        The following portions of the patient's history were reviewed and updated as appropriate: allergies, current medications, past family history, past medical history, past social history, past surgical history and problem list.    Review of Systems   Constitutional: Positive for unexpected weight gain. Negative for activity change, appetite change and unexpected weight loss.   Respiratory: Positive for cough and shortness of breath.    Gastrointestinal: Positive for vomiting (occasionally) and GERD.   Musculoskeletal: Positive for arthralgias (foot pain).   Allergic/Immunologic: Negative for immunocompromised state.   Psychiatric/Behavioral: Positive for sleep disturbance. Negative for agitation, dysphoric mood, depressed mood and stress (better). The patient is not nervous/anxious.        Objective   /68 (BP Location: Left arm, Patient Position: Sitting)   Pulse 66   Temp 97.5 °F (36.4 °C) (Temporal)   Ht 167 cm (65.75\")   Wt 108 kg (238 lb)   SpO2 96%   BMI 38.71 kg/m²   Physical Exam   Constitutional: She is oriented to person, place, and time. She appears well-developed and well-nourished. No distress.   HENT:   Head: Normocephalic and atraumatic.   Eyes: Pupils are equal, round, and reactive to light. Conjunctivae and EOM are normal. Right eye exhibits no discharge. Left eye exhibits no discharge.   Neck: Normal range of motion. Neck " supple.   Cardiovascular: Normal rate and regular rhythm.   Murmur (1/6 myriam) heard.  Pulmonary/Chest: Effort normal. She has wheezes (some wheezing).   Musculoskeletal: She exhibits no edema.   Neurological: She is alert and oriented to person, place, and time. No cranial nerve deficit.   Skin: Skin is warm and dry. No rash noted. She is not diaphoretic.   Psychiatric: She has a normal mood and affect. Her behavior is normal. Judgment and thought content normal.   Nursing note and vitals reviewed.    Spirometry - not good study. Fev1/fvc- 72%, FEV1 1.4 (54%); possible restriction  Assessment/Plan   Jyoti was seen today for breathing issues, anxiety, depression, insomnia, foot pain and med refill.    Diagnoses and all orders for this visit:    SOB (shortness of breath)-several possible causes.  She does have a history of asthma and allergies and has not been on any inhalers.  We will go ahead and try Brio sample as well as albuterol as needed as a rescue inhaler.  Her reflux also seems to be a trigger and she will work on healthier diet.  She also has a very large hiatal hernia that might be contributing as well.  We will have her follow-up in a couple weeks and see how she is doing and decide if we need to do any further testing. We will recheck CBC/fe levels today too  -     albuterol sulfate  (90 Base) MCG/ACT inhaler; Inhale 2 puffs Every 4 (Four) Hours As Needed for Wheezing.  -     Fluticasone Furoate-Vilanterol (BREO ELLIPTA) 100-25 MCG/INH inhaler; Inhale 1 puff Daily.    Pure hypercholesterolemia-on fenofibrate but Peng may not be taking it regularly.  Check levels today  Orders:  -     Lipid Panel  -     Comprehensive Metabolic Panel  -     TSH    Chronic foot pain, left-she will also call and get her physical therapy scheduled that orthopedics recommended she start  Orders:  -     gabapentin (NEURONTIN) 600 MG tablet; 1 bid    Neuropathy - Pt has already signed controlled substance contract.  Óscar done today and appropriate.   -     gabapentin (NEURONTIN) 600 MG tablet; 1 bid    Other iron deficiency anemia  -     CBC (No Diff)  -     Ferritin  -     Iron Profile    Personal history of tobacco use, presenting hazards to health  -     CT Chest Low Dose Wo; Future

## 2020-02-20 ENCOUNTER — TELEPHONE (OUTPATIENT)
Dept: INTERNAL MEDICINE | Facility: CLINIC | Age: 65
End: 2020-02-20

## 2020-02-20 DIAGNOSIS — R06.02 SOB (SHORTNESS OF BREATH): ICD-10-CM

## 2020-02-20 NOTE — TELEPHONE ENCOUNTER
PATIENT CALLED AND STATED THAT THE SAMPLE OF RX, Fluticasone Furoate-Vilanterol (BREO ELLIPTA) 100-25 MCG/INH inhaler THAT WAS GIVEN TO HER HAS WORKED AND SHE WOULD LIKE A PRESCRIPTION FOR IT.    [facility] ON [facility] CONFIRMED    PATIENT CALLBACK # [phone]

## 2020-02-21 DIAGNOSIS — E78.00 PURE HYPERCHOLESTEROLEMIA: ICD-10-CM

## 2020-02-21 RX ORDER — FENOFIBRATE 160 MG/1
160 TABLET ORAL DAILY
Qty: 30 TABLET | Refills: 5 | Status: SHIPPED | OUTPATIENT
Start: 2020-02-21 | End: 2020-10-18 | Stop reason: SDUPTHER

## 2020-02-24 ENCOUNTER — TELEPHONE (OUTPATIENT)
Dept: INTERNAL MEDICINE | Facility: CLINIC | Age: 65
End: 2020-02-24

## 2020-02-24 DIAGNOSIS — K22.70 BARRETT'S ESOPHAGUS WITHOUT DYSPLASIA: Primary | ICD-10-CM

## 2020-02-24 DIAGNOSIS — D50.8 OTHER IRON DEFICIENCY ANEMIA: ICD-10-CM

## 2020-02-24 NOTE — TELEPHONE ENCOUNTER
----- Message from Waleska Miller MD sent at 2/21/2020  4:03 PM EST -----  Can you let her know, her labs show she is anemic again and iron levels are low, so I do want to go ahead and have ehr start an iron supplement again- she can do over-the-counter ferrous sulfate one daily on empty stomach and can increase as tolerated up to 3 a day.  Also, I want to go ahead and get ehr back in for an endoscopy since it has been 3 years and she has the BArretts- is she OK if we get that scheduled w/ Dr Sandoval?

## 2020-02-28 ENCOUNTER — OUTSIDE FACILITY SERVICE (OUTPATIENT)
Dept: GASTROENTEROLOGY | Facility: CLINIC | Age: 65
End: 2020-02-28

## 2020-02-28 ENCOUNTER — LAB REQUISITION (OUTPATIENT)
Dept: LAB | Facility: HOSPITAL | Age: 65
End: 2020-02-28

## 2020-02-28 DIAGNOSIS — K22.70 BARRETT'S ESOPHAGUS WITHOUT DYSPLASIA: ICD-10-CM

## 2020-02-28 DIAGNOSIS — D64.9 ANEMIA, UNSPECIFIED: ICD-10-CM

## 2020-02-28 PROCEDURE — 88305 TISSUE EXAM BY PATHOLOGIST: CPT | Performed by: INTERNAL MEDICINE

## 2020-02-28 PROCEDURE — 43239 EGD BIOPSY SINGLE/MULTIPLE: CPT | Performed by: INTERNAL MEDICINE

## 2020-03-02 LAB
CYTO UR: NORMAL
LAB AP CASE REPORT: NORMAL
LAB AP CLINICAL INFORMATION: NORMAL
PATH REPORT.FINAL DX SPEC: NORMAL
PATH REPORT.GROSS SPEC: NORMAL

## 2020-03-05 ENCOUNTER — OFFICE VISIT (OUTPATIENT)
Dept: INTERNAL MEDICINE | Facility: CLINIC | Age: 65
End: 2020-03-05

## 2020-03-05 VITALS
HEIGHT: 66 IN | HEART RATE: 68 BPM | OXYGEN SATURATION: 98 % | SYSTOLIC BLOOD PRESSURE: 138 MMHG | BODY MASS INDEX: 37.99 KG/M2 | WEIGHT: 236.4 LBS | DIASTOLIC BLOOD PRESSURE: 84 MMHG | TEMPERATURE: 98.3 F

## 2020-03-05 DIAGNOSIS — D50.8 OTHER IRON DEFICIENCY ANEMIA: ICD-10-CM

## 2020-03-05 DIAGNOSIS — R06.02 SOB (SHORTNESS OF BREATH): ICD-10-CM

## 2020-03-05 DIAGNOSIS — E78.00 PURE HYPERCHOLESTEROLEMIA: Primary | ICD-10-CM

## 2020-03-05 DIAGNOSIS — R25.2 LEG CRAMPS: ICD-10-CM

## 2020-03-05 PROCEDURE — 99214 OFFICE O/P EST MOD 30 MIN: CPT | Performed by: INTERNAL MEDICINE

## 2020-03-05 RX ORDER — METHOCARBAMOL 500 MG/1
TABLET, FILM COATED ORAL
Qty: 30 TABLET | Refills: 5 | Status: SHIPPED | OUTPATIENT
Start: 2020-03-05 | End: 2020-06-19

## 2020-03-05 NOTE — PROGRESS NOTES
Subjective   Jyoti Puga is a 64 y.o. female.     History of Present Illness     Here for f/u on:    Shortness of breath- at last visit we started Breo daily with albuterol to use as needed.she has used the breo daily, and has helped the shortness of breath quite a bit.  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 Steiner's and it showed some antral erosions and he was advised her to stop ibuprofen.  She also has Estrada's and the large hiatal hernia.  There was also some retained food suggesting gastroparesis.she has stopped the ibuprofen    Iron deficiency anemia- H/H was 8/25.  We asked her to restart iron, and she is on 3x/day and is tolerating    Feet are cramping a lot at the end of the day. Doesn't drink a lot of fluids during the day    Current Outpatient Medications on File Prior to Visit   Medication Sig Dispense Refill   • albuterol sulfate  (90 Base) MCG/ACT inhaler Inhale 2 puffs Every 4 (Four) Hours As Needed for Wheezing. 1 inhaler 11   • Cholecalciferol (VITAMIN D3) 64023 units capsule Take 1 capsule by mouth Every 7 (Seven) Days. 4 capsule 2   • fenofibrate 160 MG tablet Take 1 tablet by mouth Daily. 30 tablet 5   • Fluticasone Furoate-Vilanterol (BREO ELLIPTA) 100-25 MCG/INH inhaler Inhale 1 puff Daily. 1 inhaler 11   • gabapentin (NEURONTIN) 600 MG tablet 1 bid 180 tablet 0   • RABEprazole (ACIPHEX) 20 MG EC tablet Take 1 tablet by mouth Daily. 90 tablet 3     No current facility-administered medications on file prior to visit.        The following portions of the patient's history were reviewed and updated as appropriate: allergies, current medications, past family history, past medical history, past social history, past surgical history and problem list.    Review of Systems   Constitutional: Negative for unexpected weight gain and unexpected weight loss.   Respiratory: Negative for shortness of breath (imporved).   "  Gastrointestinal: Positive for GERD. Negative for abdominal pain.   Musculoskeletal: Positive for back pain and myalgias (foot/leg cramps).   Allergic/Immunologic: Negative for immunocompromised state.       Objective   /84 (BP Location: Right arm, Patient Position: Sitting)   Pulse 68   Temp 98.3 °F (36.8 °C) (Temporal)   Ht 167 cm (65.75\")   Wt 107 kg (236 lb 6.4 oz)   SpO2 98%   BMI 38.45 kg/m²   Physical Exam   Constitutional: She is oriented to person, place, and time. She appears well-developed and well-nourished. No distress.   HENT:   Head: Normocephalic and atraumatic.   Eyes: Pupils are equal, round, and reactive to light. Conjunctivae and EOM are normal. Right eye exhibits no discharge. Left eye exhibits no discharge.   Neck: Normal range of motion. Neck supple.   Cardiovascular: Normal rate and regular rhythm.   Pulmonary/Chest: Effort normal and breath sounds normal.   Musculoskeletal: She exhibits no edema.   Neurological: She is alert and oriented to person, place, and time. No cranial nerve deficit.   Skin: Skin is warm and dry. No rash noted. She is not diaphoretic.   Psychiatric: She has a normal mood and affect. Her behavior is normal. Judgment and thought content normal.   Nursing note and vitals reviewed.        Assessment/Plan   Jyoti was seen today for shortness of breath and feet cramping.    Diagnoses and all orders for this visit:    Pure hypercholesterolemia- recheck in 8/20    Other iron deficiency anemia  Comments:  Probably from the antral erosions seen on her endoscopy.  She is off NSAIDs.  We will have her continue iron and recheck CBC and iron levels in May.  Her colon is due in '22    SOB (shortness of breath)- better w/ breo. We will continue.    Leg cramps- try to increase fluids and electrolytes.  We will also try muscle relaxer as needed  -     methocarbamol (ROBAXIN) 500 MG tablet; 1 qpm as needed for muscle cramps          Preventative-she does have her CT lung " cancer screen scheduled for later this month

## 2020-03-13 DIAGNOSIS — G62.9 NEUROPATHY: ICD-10-CM

## 2020-03-13 DIAGNOSIS — M79.672 CHRONIC FOOT PAIN, LEFT: ICD-10-CM

## 2020-03-13 DIAGNOSIS — G89.29 CHRONIC FOOT PAIN, LEFT: ICD-10-CM

## 2020-03-13 RX ORDER — GABAPENTIN 600 MG/1
TABLET ORAL
Qty: 90 TABLET | OUTPATIENT
Start: 2020-03-13

## 2020-03-17 ENCOUNTER — HOSPITAL ENCOUNTER (OUTPATIENT)
Dept: CT IMAGING | Facility: HOSPITAL | Age: 65
Discharge: HOME | End: 2020-03-17
Admitting: INTERNAL MEDICINE

## 2020-03-17 DIAGNOSIS — Z87.891 PERSONAL HISTORY OF TOBACCO USE, PRESENTING HAZARDS TO HEALTH: ICD-10-CM

## 2020-03-17 DIAGNOSIS — M79.672 CHRONIC FOOT PAIN, LEFT: ICD-10-CM

## 2020-03-17 DIAGNOSIS — G89.29 CHRONIC FOOT PAIN, LEFT: ICD-10-CM

## 2020-03-17 DIAGNOSIS — G62.9 NEUROPATHY: ICD-10-CM

## 2020-03-17 PROCEDURE — G0297 LDCT FOR LUNG CA SCREEN: HCPCS

## 2020-03-17 RX ORDER — GABAPENTIN 600 MG/1
TABLET ORAL
Qty: 180 TABLET | Refills: 0 | Status: CANCELLED | OUTPATIENT
Start: 2020-03-17

## 2020-03-18 ENCOUNTER — TELEPHONE (OUTPATIENT)
Dept: INTERNAL MEDICINE | Facility: CLINIC | Age: 65
End: 2020-03-18

## 2020-03-18 NOTE — TELEPHONE ENCOUNTER
----- Message from Waleska Miller MD sent at 3/18/2020  1:43 PM EDT -----  Can you let her know the CT scan looks good as far as the lungs go.  She  has the large hiatal hernia, no change in that.  We can plan to recheck in 1 year, routine screening

## 2020-04-16 ENCOUNTER — APPOINTMENT (OUTPATIENT)
Dept: CT IMAGING | Facility: HOSPITAL | Age: 65
End: 2020-04-16

## 2020-04-16 ENCOUNTER — HOSPITAL ENCOUNTER (EMERGENCY)
Facility: HOSPITAL | Age: 65
Discharge: HOME | End: 2020-04-16
Attending: EMERGENCY MEDICINE | Admitting: EMERGENCY MEDICINE

## 2020-04-16 VITALS
DIASTOLIC BLOOD PRESSURE: 72 MMHG | WEIGHT: 230 LBS | TEMPERATURE: 98.8 F | HEART RATE: 79 BPM | RESPIRATION RATE: 18 BRPM | BODY MASS INDEX: 36.96 KG/M2 | SYSTOLIC BLOOD PRESSURE: 136 MMHG | HEIGHT: 66 IN | OXYGEN SATURATION: 94 %

## 2020-04-16 DIAGNOSIS — R03.0 ELEVATED BLOOD-PRESSURE READING WITHOUT DIAGNOSIS OF HYPERTENSION: ICD-10-CM

## 2020-04-16 DIAGNOSIS — R10.9 ACUTE ABDOMINAL PAIN: ICD-10-CM

## 2020-04-16 DIAGNOSIS — N20.1 LEFT URETERAL STONE: Primary | ICD-10-CM

## 2020-04-16 LAB
ALBUMIN SERPL-MCNC: 4.5 G/DL (ref 3.5–5.2)
ALBUMIN/GLOB SERPL: 1.7 G/DL
ALP SERPL-CCNC: 57 U/L (ref 39–117)
ALT SERPL W P-5'-P-CCNC: 17 U/L (ref 1–33)
ANION GAP SERPL CALCULATED.3IONS-SCNC: 11 MMOL/L (ref 5–15)
AST SERPL-CCNC: 28 U/L (ref 1–32)
BACTERIA UR QL AUTO: ABNORMAL /HPF
BASOPHILS # BLD AUTO: 0.04 10*3/MM3 (ref 0–0.2)
BASOPHILS NFR BLD AUTO: 0.4 % (ref 0–1.5)
BILIRUB SERPL-MCNC: 0.3 MG/DL (ref 0.2–1.2)
BILIRUB UR QL STRIP: NEGATIVE
BLADDER EPITHELIAL: ABNORMAL /LPF
BUN BLD-MCNC: 19 MG/DL (ref 8–23)
BUN/CREAT SERPL: 20.9 (ref 7–25)
CALCIUM SPEC-SCNC: 10.1 MG/DL (ref 8.6–10.5)
CHLORIDE SERPL-SCNC: 102 MMOL/L (ref 98–107)
CLARITY UR: ABNORMAL
CO2 SERPL-SCNC: 28 MMOL/L (ref 22–29)
COLOR UR: YELLOW
CREAT BLD-MCNC: 0.91 MG/DL (ref 0.57–1)
DEPRECATED RDW RBC AUTO: 51.3 FL (ref 37–54)
EOSINOPHIL # BLD AUTO: 0.04 10*3/MM3 (ref 0–0.4)
EOSINOPHIL NFR BLD AUTO: 0.4 % (ref 0.3–6.2)
ERYTHROCYTE [DISTWIDTH] IN BLOOD BY AUTOMATED COUNT: 16 % (ref 12.3–15.4)
GFR SERPL CREATININE-BSD FRML MDRD: 63 ML/MIN/1.73
GLOBULIN UR ELPH-MCNC: 2.6 GM/DL
GLUCOSE BLD-MCNC: 152 MG/DL (ref 65–99)
GLUCOSE UR STRIP-MCNC: NEGATIVE MG/DL
HCT VFR BLD AUTO: 39.2 % (ref 34–46.6)
HGB BLD-MCNC: 11.9 G/DL (ref 12–15.9)
HGB UR QL STRIP.AUTO: ABNORMAL
HOLD SPECIMEN: NORMAL
HOLD SPECIMEN: NORMAL
HYALINE CASTS UR QL AUTO: ABNORMAL /LPF
IMM GRANULOCYTES # BLD AUTO: 0.02 10*3/MM3 (ref 0–0.05)
IMM GRANULOCYTES NFR BLD AUTO: 0.2 % (ref 0–0.5)
KETONES UR QL STRIP: NEGATIVE
LEUKOCYTE ESTERASE UR QL STRIP.AUTO: NEGATIVE
LIPASE SERPL-CCNC: 30 U/L (ref 13–60)
LYMPHOCYTES # BLD AUTO: 0.96 10*3/MM3 (ref 0.7–3.1)
LYMPHOCYTES NFR BLD AUTO: 9.7 % (ref 19.6–45.3)
MCH RBC QN AUTO: 26.7 PG (ref 26.6–33)
MCHC RBC AUTO-ENTMCNC: 30.4 G/DL (ref 31.5–35.7)
MCV RBC AUTO: 87.9 FL (ref 79–97)
MONOCYTES # BLD AUTO: 0.44 10*3/MM3 (ref 0.1–0.9)
MONOCYTES NFR BLD AUTO: 4.4 % (ref 5–12)
NEUTROPHILS # BLD AUTO: 8.41 10*3/MM3 (ref 1.7–7)
NEUTROPHILS NFR BLD AUTO: 84.9 % (ref 42.7–76)
NITRITE UR QL STRIP: NEGATIVE
NRBC BLD AUTO-RTO: 0 /100 WBC (ref 0–0.2)
PH UR STRIP.AUTO: 7.5 [PH] (ref 5–8)
PLATELET # BLD AUTO: 261 10*3/MM3 (ref 140–450)
PMV BLD AUTO: 11.1 FL (ref 6–12)
POTASSIUM BLD-SCNC: 3.9 MMOL/L (ref 3.5–5.2)
PROT SERPL-MCNC: 7.1 G/DL (ref 6–8.5)
PROT UR QL STRIP: NEGATIVE
RBC # BLD AUTO: 4.46 10*6/MM3 (ref 3.77–5.28)
RBC # UR: ABNORMAL /HPF
REF LAB TEST METHOD: ABNORMAL
RENAL EPI CELLS #/AREA URNS HPF: ABNORMAL /[HPF]
SODIUM BLD-SCNC: 141 MMOL/L (ref 136–145)
SP GR UR STRIP: 1.01 (ref 1–1.03)
SQUAMOUS #/AREA URNS HPF: ABNORMAL /HPF
UROBILINOGEN UR QL STRIP: ABNORMAL
WBC NRBC COR # BLD: 9.91 10*3/MM3 (ref 3.4–10.8)
WBC UR QL AUTO: ABNORMAL /HPF
WHOLE BLOOD HOLD SPECIMEN: NORMAL
WHOLE BLOOD HOLD SPECIMEN: NORMAL

## 2020-04-16 PROCEDURE — 74176 CT ABD & PELVIS W/O CONTRAST: CPT

## 2020-04-16 PROCEDURE — 83690 ASSAY OF LIPASE: CPT | Performed by: EMERGENCY MEDICINE

## 2020-04-16 PROCEDURE — 80053 COMPREHEN METABOLIC PANEL: CPT | Performed by: EMERGENCY MEDICINE

## 2020-04-16 PROCEDURE — 96375 TX/PRO/DX INJ NEW DRUG ADDON: CPT

## 2020-04-16 PROCEDURE — 99284 EMERGENCY DEPT VISIT MOD MDM: CPT

## 2020-04-16 PROCEDURE — 85025 COMPLETE CBC W/AUTO DIFF WBC: CPT | Performed by: EMERGENCY MEDICINE

## 2020-04-16 PROCEDURE — 25010000002 ONDANSETRON PER 1 MG: Performed by: EMERGENCY MEDICINE

## 2020-04-16 PROCEDURE — 25010000002 KETOROLAC TROMETHAMINE PER 15 MG: Performed by: EMERGENCY MEDICINE

## 2020-04-16 PROCEDURE — 96374 THER/PROPH/DIAG INJ IV PUSH: CPT

## 2020-04-16 PROCEDURE — 81001 URINALYSIS AUTO W/SCOPE: CPT | Performed by: EMERGENCY MEDICINE

## 2020-04-16 RX ORDER — SODIUM CHLORIDE 0.9 % (FLUSH) 0.9 %
10 SYRINGE (ML) INJECTION AS NEEDED
Status: DISCONTINUED | OUTPATIENT
Start: 2020-04-16 | End: 2020-04-16 | Stop reason: HOSPADM

## 2020-04-16 RX ORDER — KETOROLAC TROMETHAMINE 15 MG/ML
15 INJECTION, SOLUTION INTRAMUSCULAR; INTRAVENOUS ONCE
Status: COMPLETED | OUTPATIENT
Start: 2020-04-16 | End: 2020-04-16

## 2020-04-16 RX ORDER — ONDANSETRON 2 MG/ML
4 INJECTION INTRAMUSCULAR; INTRAVENOUS ONCE
Status: COMPLETED | OUTPATIENT
Start: 2020-04-16 | End: 2020-04-16

## 2020-04-16 RX ORDER — HYDROMORPHONE HYDROCHLORIDE 2 MG/1
2 TABLET ORAL EVERY 4 HOURS PRN
Qty: 12 TABLET | Refills: 0 | Status: SHIPPED | OUTPATIENT
Start: 2020-04-16 | End: 2020-10-14

## 2020-04-16 RX ORDER — TAMSULOSIN HYDROCHLORIDE 0.4 MG/1
1 CAPSULE ORAL NIGHTLY
Qty: 30 CAPSULE | Refills: 0 | Status: SHIPPED | OUTPATIENT
Start: 2020-04-16 | End: 2020-10-14

## 2020-04-16 RX ADMIN — SODIUM CHLORIDE 1000 ML: 9 INJECTION, SOLUTION INTRAVENOUS at 06:28

## 2020-04-16 RX ADMIN — ONDANSETRON 4 MG: 2 INJECTION INTRAMUSCULAR; INTRAVENOUS at 06:28

## 2020-04-16 RX ADMIN — KETOROLAC TROMETHAMINE 15 MG: 15 INJECTION, SOLUTION INTRAMUSCULAR; INTRAVENOUS at 06:28

## 2020-04-16 NOTE — DISCHARGE INSTRUCTIONS
Do not drive while taking the pain medicine I have prescribed as it will make you sleepy.  Drink plenty of fluids.  Return if you have fever, if you are vomiting and cannot hold down your medications, or if your pain is not adequately controlled.  Call Dr. Campos today to arrange follow-up with them in the next few days.

## 2020-04-16 NOTE — ED PROVIDER NOTES
Subjective   Mrs. Puga presents with left lower quadrant abdominal pain.  It started around midnight gradually.  She has found no aggravating or alleviating factors.  She has had nausea and vomiting with this.  She felt fine yesterday.  She has had kidney stones before and tells me this feels like the same thing.  She has history of a large hiatal hernia with Estrada's esophagus.  She sees Dr. Steiner's for that.  She has had prior hysterectomy with BSO.      History provided by:  Patient  Abdominal Pain   Pain location:  LLQ  Pain quality: dull    Pain radiates to:  L flank  Pain severity:  Severe  Onset quality:  Gradual  Timing:  Constant  Progression:  Worsening  Chronicity:  Recurrent  Worsened by:  Nothing  Ineffective treatments: Gabapentin.  Associated symptoms: nausea and vomiting    Associated symptoms: no chest pain, no chills, no dysuria, no fever, no hematuria and no shortness of breath        Review of Systems   Constitutional: Negative for chills and fever.   Respiratory: Negative for shortness of breath.    Cardiovascular: Negative for chest pain.   Gastrointestinal: Positive for abdominal pain, nausea and vomiting.   Genitourinary: Positive for decreased urine volume. Negative for dysuria and hematuria.   Musculoskeletal: Positive for back pain.   Neurological: Negative for weakness.   All other systems reviewed and are negative.      Past Medical History:   Diagnosis Date   • Acid reflux    • Estrada's esophagus     Dr lucas. egd '12, 6/14. repeat in 3 years; '17.   • Cancer (CMS/HCC)     skin    • Chronic bronchitis (CMS/HCC)    • Colitis    • Diverticulosis    • Dysuria    • Elevated blood pressure reading    • Elevated glucose    • Environmental allergies 11/23/2015    skin testing +trees, mold, grass   • Family history of diabetes mellitus    • H/O bone density study 01/2008   • H/[facility] mammogram [facility]   • Hyperlipidemia    • Kidney stones    • Mild asthma  2015    spirometry Dr Davisfev1 73%, after bronchodilator - 82%. official interpretation - mild restrictive defect   • Osteoarthritis of left foot 2019    MRI Dr Munoz - will try injections   • Pap smear for cervical cancer screening     not since hysterectomy   • Peptic ulcer     tried and failed: pepcid, zantac, tums and nexium   • Swelling of lymph nodes    • Wheezing        Allergies   Allergen Reactions   • Codeine Nausea Only       Past Surgical History:   Procedure Laterality Date   • COLONOSCOPY  2017    Dr. Aguirre   • ENDOSCOPY      suarez's esophagus   • ENDOSCOPY  2017    Dr. Aguirre   • TONSILLECTOMY     • TOTAL ABDOMINAL HYSTERECTOMY WITH SALPINGO OOPHORECTOMY      endometriosis       Family History   Problem Relation Age of Onset   • Hypertension Mother    • Diabetes Sister    • Hypertension Sister    • Diabetes Brother    • Hypertension Brother    • Melanoma Brother    • Diabetes Other    • Diabetes Other    • Diabetes Father    • Esophageal cancer Father    • Diabetes Paternal Aunt        Social History     Socioeconomic History   • Marital status:      Spouse name: Not on file   • Number of children: Not on file   • Years of education: Not on file   • Highest education level: Not on file   Tobacco Use   • Smoking status: Former Smoker     Packs/day: 2.00     Years: 37.00     Pack years: 74.00     Types: Cigarettes     Start date: 1975     Last attempt to quit: 2012     Years since quittin.2   • Tobacco comment: quit smoking in 2010; -  ppd   Substance and Sexual Activity   • Alcohol use: No   • Drug use: No   • Sexual activity: Defer           Objective   Physical Exam   Constitutional: She is oriented to person, place, and time. She appears well-developed and well-nourished.   She is easily able to give history but does appear uncomfortable.  She is moaning occasionally and rubbing her abdomen.   HENT:   Head: Normocephalic and  atraumatic.   Eyes: No scleral icterus.   Cardiovascular: Normal rate, regular rhythm and normal heart sounds.   No murmur heard.  Pulmonary/Chest: Effort normal and breath sounds normal. She has no wheezes. She has no rales.   Abdominal: Soft. Normal appearance and bowel sounds are normal. There is no tenderness. There is no rebound.   Neurological: She is alert and oriented to person, place, and time.   Skin: Skin is warm and dry. Capillary refill takes less than 2 seconds. No rash noted. No erythema.   Psychiatric: She has a normal mood and affect. Her behavior is normal.   Nursing note and vitals reviewed.      Procedures           ED Course  ED Course as of Apr 16 0907   Thu Apr 16, 2020   0802 We are awaiting radiologist interpretation of her CAT scan.  I have viewed it and see a large proximal stone.  She has multiple small stones in both kidneys.  On repeat exam at this point she is comfortable and tells me she feels better after the Toradol.      [DT]   9944 I have reviewed the CT report and spoken with Mrs. Puga.     [DT]      ED Course User Index  [DT] Felipe Barron MD                                           MDM  Number of Diagnoses or Management Options  Acute abdominal pain: new and requires workup  Elevated blood-pressure reading without diagnosis of hypertension:   Left ureteral stone: new and requires workup     Amount and/or Complexity of Data Reviewed  Clinical lab tests: reviewed and ordered  Tests in the radiology section of CPT®: ordered and reviewed  Review and summarize past medical records: yes  Independent visualization of images, tracings, or specimens: yes    Patient Progress  Patient progress: improved      Final diagnoses:   Elevated blood-pressure reading without diagnosis of hypertension   Acute abdominal pain   Left ureteral stone            Felipe Barron MD  04/16/20 0907

## 2020-04-21 ENCOUNTER — HOSPITAL ENCOUNTER (OUTPATIENT)
Facility: HOSPITAL | Age: 65
Setting detail: HOSPITAL OUTPATIENT SURGERY
End: 2020-04-21
Attending: UROLOGY | Admitting: UROLOGY

## 2020-04-23 ENCOUNTER — TELEPHONE (OUTPATIENT)
Dept: INTERNAL MEDICINE | Facility: CLINIC | Age: 65
End: 2020-04-23

## 2020-04-23 ENCOUNTER — OFFICE VISIT (OUTPATIENT)
Dept: INTERNAL MEDICINE | Facility: CLINIC | Age: 65
End: 2020-04-23

## 2020-04-23 DIAGNOSIS — N20.0 NEPHROLITHIASIS: Primary | ICD-10-CM

## 2020-04-23 PROCEDURE — 99441 PR PHYS/QHP TELEPHONE EVALUATION 5-10 MIN: CPT | Performed by: INTERNAL MEDICINE

## 2020-04-23 RX ORDER — HYDROCODONE BITARTRATE AND ACETAMINOPHEN 7.5; 325 MG/1; MG/1
TABLET ORAL
COMMUNITY
Start: 2020-04-19 | End: 2020-10-14

## 2020-04-23 RX ORDER — ONDANSETRON 4 MG/1
TABLET, ORALLY DISINTEGRATING ORAL
COMMUNITY
Start: 2020-04-19 | End: 2020-10-14

## 2020-04-23 NOTE — PROGRESS NOTES
You have chosen to receive care through a telephone visit. Do you consent to use a telephone visit for your medical care today? YES

## 2020-04-23 NOTE — PROGRESS NOTES
Subjective   Jyoti Puga is a 64 y.o. female.     History of Present Illness   This visit was a telephone visit because of the COVID-19 pandemic. Total visit time was approximately 6 minutes    Pt was seen at [facility] ER on 4/16 and diagnosed with left kidney stone by CT scan, that is 7x11mm in size; hydronephrosis was seen as well. She then also was seen at  ER on 4/19   Saw Dr Campos 4/21 and is scheleduled for lithotripsy 5/1 but pt in a lot of pain and does not think she can wait. It sounds like after her  ER visit, they referred her to Dr William but because she had already seen Dr Campos, that appt was canceled. She has been given pain medication - dilaudid- and she is taking but still in a lot of pain        Current Outpatient Medications on File Prior to Visit   Medication Sig Dispense Refill   • albuterol sulfate  (90 Base) MCG/ACT inhaler Inhale 2 puffs Every 4 (Four) Hours As Needed for Wheezing. 1 inhaler 11   • Cholecalciferol (VITAMIN D3) 19215 units capsule Take 1 capsule by mouth Every 7 (Seven) Days. 4 capsule 2   • fenofibrate 160 MG tablet Take 1 tablet by mouth Daily. 30 tablet 5   • Fluticasone Furoate-Vilanterol (BREO ELLIPTA) 100-25 MCG/INH inhaler Inhale 1 puff Daily. 1 inhaler 11   • gabapentin (NEURONTIN) 600 MG tablet 1 bid 180 tablet 0   • HYDROcodone-acetaminophen (NORCO) 7.5-325 MG per tablet TK 1 T PO  Q 6 H PRF PAIN     • HYDROmorphone (DILAUDID) 2 MG tablet Take 1 tablet by mouth Every 4 (Four) Hours As Needed for Moderate Pain . 12 tablet 0   • methocarbamol (ROBAXIN) 500 MG tablet 1 qpm as needed for muscle cramps 30 tablet 5   • ondansetron ODT (ZOFRAN-ODT) 4 MG disintegrating tablet DIS ONE T PO  TID     • RABEprazole (ACIPHEX) 20 MG EC tablet Take 1 tablet by mouth Daily. 90 tablet 3   • tamsulosin (FLOMAX) 0.4 MG capsule 24 hr capsule Take 1 capsule by mouth Every Night. 30 capsule 0     No current facility-administered medications on file prior to visit.        The  following portions of the patient's history were reviewed and updated as appropriate: allergies, current medications, past family history, past medical history, past social history, past surgical history and problem list.    Review of Systems   Constitutional: Positive for activity change and appetite change.   Gastrointestinal: Positive for abdominal pain, nausea and vomiting.       Objective   There were no vitals taken for this visit.  Physical Exam   Constitutional: pt sounds in pain.    Neurological:  alert and oriented to person, place, and time.    Psychiatric:  hormal mood and affect.  behavior is normal. Judgment and thought content normal.   Nursing note and vitals reviewed.    Assessment/Plan   Jyoti was seen today for flank pain.    Diagnoses and all orders for this visit:    Nephrolithiasis      Has lithotripsy scheduled next week, but pt reports severe pain and doesn't feel she can wait. I asked her to call Dr Campos's office and see if they can get her in sooner. If she is unable to, I asked her to let us know and we can call as well

## 2020-04-30 ENCOUNTER — ANESTHESIA EVENT (OUTPATIENT)
Dept: PERIOP | Facility: HOSPITAL | Age: 65
End: 2020-04-30

## 2020-04-30 RX ORDER — SODIUM CHLORIDE 0.9 % (FLUSH) 0.9 %
10 SYRINGE (ML) INJECTION EVERY 12 HOURS SCHEDULED
Status: CANCELLED | OUTPATIENT
Start: 2020-04-30

## 2020-04-30 RX ORDER — SODIUM CHLORIDE 0.9 % (FLUSH) 0.9 %
10 SYRINGE (ML) INJECTION AS NEEDED
Status: CANCELLED | OUTPATIENT
Start: 2020-04-30

## 2020-04-30 RX ORDER — FAMOTIDINE 10 MG/ML
20 INJECTION, SOLUTION INTRAVENOUS ONCE
Status: CANCELLED | OUTPATIENT
Start: 2020-04-30 | End: 2020-04-30

## 2020-05-01 ENCOUNTER — ANESTHESIA (OUTPATIENT)
Dept: PERIOP | Facility: HOSPITAL | Age: 65
End: 2020-05-01

## 2020-05-01 RX ORDER — SODIUM CHLORIDE, SODIUM LACTATE, POTASSIUM CHLORIDE, CALCIUM CHLORIDE 600; 310; 30; 20 MG/100ML; MG/100ML; MG/100ML; MG/100ML
9 INJECTION, SOLUTION INTRAVENOUS CONTINUOUS
Status: DISCONTINUED | OUTPATIENT
Start: 2020-05-01 | End: 2020-05-04 | Stop reason: HOSPADM

## 2020-05-01 RX ORDER — LIDOCAINE HYDROCHLORIDE 10 MG/ML
0.5 INJECTION, SOLUTION EPIDURAL; INFILTRATION; INTRACAUDAL; PERINEURAL ONCE AS NEEDED
Status: DISCONTINUED | OUTPATIENT
Start: 2020-05-01 | End: 2020-05-04 | Stop reason: HOSPADM

## 2020-05-01 RX ORDER — FAMOTIDINE 20 MG/1
20 TABLET, FILM COATED ORAL ONCE
Status: DISCONTINUED | OUTPATIENT
Start: 2020-05-01 | End: 2020-05-04 | Stop reason: HOSPADM

## 2020-05-01 RX ORDER — CEFAZOLIN SODIUM 2 G/100ML
2 INJECTION, SOLUTION INTRAVENOUS ONCE
Status: DISCONTINUED | OUTPATIENT
Start: 2020-05-01 | End: 2020-05-04 | Stop reason: HOSPADM

## 2020-05-27 ENCOUNTER — OFFICE VISIT (OUTPATIENT)
Dept: GASTROENTEROLOGY | Facility: CLINIC | Age: 65
End: 2020-05-27

## 2020-05-27 VITALS
SYSTOLIC BLOOD PRESSURE: 137 MMHG | WEIGHT: 228.6 LBS | HEART RATE: 76 BPM | BODY MASS INDEX: 36.9 KG/M2 | TEMPERATURE: 98.2 F | DIASTOLIC BLOOD PRESSURE: 76 MMHG

## 2020-05-27 DIAGNOSIS — K57.92 DIVERTICULITIS: ICD-10-CM

## 2020-05-27 DIAGNOSIS — K21.9 GASTROESOPHAGEAL REFLUX DISEASE, ESOPHAGITIS PRESENCE NOT SPECIFIED: Primary | ICD-10-CM

## 2020-05-27 DIAGNOSIS — Z86.010 HISTORY OF ADENOMATOUS POLYP OF COLON: ICD-10-CM

## 2020-05-27 DIAGNOSIS — K22.70 BARRETT'S ESOPHAGUS WITHOUT DYSPLASIA: ICD-10-CM

## 2020-05-27 DIAGNOSIS — K31.84 GASTROPARESIS: ICD-10-CM

## 2020-05-27 PROBLEM — Z86.0101 HISTORY OF ADENOMATOUS POLYP OF COLON: Status: ACTIVE | Noted: 2020-05-27

## 2020-05-27 PROCEDURE — 99213 OFFICE O/P EST LOW 20 MIN: CPT | Performed by: INTERNAL MEDICINE

## 2020-05-27 RX ORDER — RABEPRAZOLE SODIUM 20 MG/1
TABLET, DELAYED RELEASE ORAL
Qty: 90 TABLET | Refills: 3 | Status: SHIPPED | OUTPATIENT
Start: 2020-05-27 | End: 2020-05-30

## 2020-05-27 NOTE — PROGRESS NOTES
GASTROENTEROLOGY OFFICE NOTE  Jyoti Puga  [id number]  1955    CARE TEAM  Patient Care Team:  Waleska Miller MD as PCP - General (Internal Medicine)  Navin Aguirre MD as Consulting Physician (Gastroenterology)    No ref. provider found     Chief Complaint   Patient presents with   • Diverticulitis        HISTORY OF PRESENT ILLNESS:  Patient presents for follow-up of diverticulitis.    64-year-old white female with history of Estrada's esophagus, gastroparesis, remote history of colonic polyps and history of diverticulosis.  Last colonoscopy in 2017 was unremarkable.  Bowel prep visualization was excellent prior to that she had a 2014 colonoscopy also with excellent prep and visualization.    She recently had problems with kidney stones was prescribed narcotics and became constipated during the course of this constipation she developed diverticulitis which was confirmed, apparently, by CT of the abdomen revealing sigmoid colon diverticulitis.  She was prescribed ciprofloxacin and metronidazole for 14 days and states that her left lower quadrant pain is largely resolved.  She still little bit more frequently than usual but is not having problems with diarrhea, urge incontinence or any other localizing GI complaints and specifically is denying problems with dysphagia, odynophagia, early satiety or unexplained weight loss denies melena or bright red blood per rectum.    She was referred to us for post diverticulitis follow-up.    PAST MEDICAL HISTORY  Past Medical History:   Diagnosis Date   • Acid reflux    • Estrada's esophagus     Dr lucas. egd '12, 6/14. repeat in 3 years; '17.   • Cancer (CMS/HCC)     skin    • Chronic bronchitis (CMS/HCC)    • Colitis    • Diverticulosis    • Dysuria    • Elevated glucose    • Environmental allergies 11/23/2015    skin testing +trees, mold, grass   • Hyperlipidemia    • Kidney stones    • Mild asthma 11/23/2015    spirometry Dr Echevarriav1 73%,  after bronchodilator - 82%. official interpretation - mild restrictive defect   • Osteoarthritis of left foot 08/01/2019    MRI Dr Munoz - will try injections   • Peptic ulcer     tried and failed: pepcid, zantac, tums and nexium   • Swelling of lymph nodes         PAST SURGICAL HISTORY  Past Surgical History:   Procedure Laterality Date   • COLONOSCOPY  03/13/2017    Dr. Aguirre   • ENDOSCOPY  2015    suarez's esophagus   • ENDOSCOPY  03/06/2017    Dr. Aguirre   • TONSILLECTOMY     • TOTAL ABDOMINAL HYSTERECTOMY WITH SALPINGO OOPHORECTOMY      endometriosis        MEDICATIONS:    Current Outpatient Medications:   •  albuterol sulfate  (90 Base) MCG/ACT inhaler, Inhale 2 puffs Every 4 (Four) Hours As Needed for Wheezing., Disp: 1 inhaler, Rfl: 11  •  Cholecalciferol (VITAMIN D3) 05109 units capsule, Take 1 capsule by mouth Every 7 (Seven) Days., Disp: 4 capsule, Rfl: 2  •  fenofibrate 160 MG tablet, Take 1 tablet by mouth Daily., Disp: 30 tablet, Rfl: 5  •  Fluticasone Furoate-Vilanterol (BREO ELLIPTA) 100-25 MCG/INH inhaler, Inhale 1 puff Daily., Disp: 1 inhaler, Rfl: 11  •  gabapentin (NEURONTIN) 600 MG tablet, 1 bid, Disp: 180 tablet, Rfl: 0  •  methocarbamol (ROBAXIN) 500 MG tablet, 1 qpm as needed for muscle cramps, Disp: 30 tablet, Rfl: 5  •  RABEprazole (ACIPHEX) 20 MG EC tablet, Take 1 tablet by mouth Daily., Disp: 90 tablet, Rfl: 3  •  HYDROcodone-acetaminophen (NORCO) 7.5-325 MG per tablet, TK 1 T PO  Q 6 H PRF PAIN, Disp: , Rfl:   •  HYDROmorphone (DILAUDID) 2 MG tablet, Take 1 tablet by mouth Every 4 (Four) Hours As Needed for Moderate Pain ., Disp: 12 tablet, Rfl: 0  •  ondansetron ODT (ZOFRAN-ODT) 4 MG disintegrating tablet, DIS ONE T PO  TID, Disp: , Rfl:   •  tamsulosin (FLOMAX) 0.4 MG capsule 24 hr capsule, Take 1 capsule by mouth Every Night., Disp: 30 capsule, Rfl: 0    ALLERGIES  Allergies   Allergen Reactions   • Codeine Nausea Only       FAMILY HISTORY:  Family History    Problem Relation Age of Onset   • Hypertension Mother    • Colon polyps Mother    • Diabetes Sister    • Hypertension Sister    • Diabetes Brother    • Hypertension Brother    • Melanoma Brother    • Colon polyps Brother    • Diabetes Other    • Diabetes Other    • Diabetes Father    • Esophageal cancer Father    • Diabetes Paternal Aunt    • Colon cancer Neg Hx        SOCIAL HISTORY  Social History     Socioeconomic History   • Marital status:      Spouse name: Not on file   • Number of children: Not on file   • Years of education: Not on file   • Highest education level: Not on file   Tobacco Use   • Smoking status: Former Smoker     Packs/day: 2.00     Years: 37.00     Pack years: 74.00     Types: Cigarettes     Start date: 1975     Last attempt to quit: 2012     Years since quittin.4   • Smokeless tobacco: Never Used   • Tobacco comment: quit smoking in 2010; -  ppd   Substance and Sexual Activity   • Alcohol use: No   • Drug use: No   • Sexual activity: Defer     Socioeconomic History:  She is .  Former smoker for the past 8-1/2 years.  She does not abuse alcohol.       REVIEW OF SYSTEMS  Review of Systems   Constitutional: Negative for activity change, appetite change, chills, diaphoresis, fatigue, fever, unexpected weight gain and unexpected weight loss.   HENT: Negative for congestion, dental problem, drooling, ear discharge, ear pain, facial swelling, hearing loss, mouth sores, nosebleeds, postnasal drip, rhinorrhea, sinus pressure, sneezing, sore throat, swollen glands, tinnitus, trouble swallowing and voice change.    Respiratory: Negative for apnea, cough, choking, chest tightness, shortness of breath, wheezing and stridor.    Cardiovascular: Negative for chest pain, palpitations and leg swelling.   Gastrointestinal: Positive for abdominal distention and diarrhea. Negative for abdominal pain, anal bleeding, blood in stool, constipation, nausea, rectal pain, vomiting,  GERD and indigestion.   Endocrine: Negative for cold intolerance, heat intolerance, polydipsia, polyphagia and polyuria.   Musculoskeletal: Positive for arthralgias, back pain and joint swelling. Negative for gait problem, myalgias, neck pain, neck stiffness and bursitis.   Allergic/Immunologic: Positive for environmental allergies and food allergies. Negative for immunocompromised state.   Neurological: Negative for dizziness, tremors, seizures, syncope, facial asymmetry, speech difficulty, weakness, light-headedness, numbness, headache, memory problem and confusion.   Hematological: Negative for adenopathy. Bruises/bleeds easily.   Psychiatric/Behavioral: Positive for sleep disturbance. Negative for agitation, behavioral problems, decreased concentration, dysphoric mood, hallucinations, self-injury, suicidal ideas, negative for hyperactivity, depressed mood and stress. The patient is not nervous/anxious.      I reviewed the above-noted review of systems    PHYSICAL EXAM   There were no vitals taken for this visit.  General: Alert and oriented x 3. In no apparent or acute distress.  and No stigmata of chronic liver disease  HEENT: Anicteric slcera. Normal oropharynx  Neck: Supple. Without lymphadenopathy  CV: Regular rate and rhythm, S1, S2  Lungs: Clear to ausculation. Without rales, rhonchi and wheezing  Abdomen: Abdomen is soft with normal bowel sounds no palpable masses there is minimal tenderness in the left lower quadrant without rebound and only with some voluntary guarding.  Extremeties: without clubbing, cyanosis or edema  Neurologic:  Alert and oriented x 3 without focal motor or sensory deficits  Rectal exam: deferred        Results Review:  I reviewed the patient's new clinical results.      ASSESSMENT  1.-  Recent diverticulitis probably precipitated by opioid-induced constipation.  Now resolved.  Without any alarm symptoms.  Given her colonoscopies in 2017 in 2014 repeat colonoscopy is not indicated  at this time.  2.-  Gastroparesis.  Doing well with dietary modifications  3.-  GERD  AcipHex is already been refilled  4.-  History of Estrada's esophagus not confirmed on February 2020 EGD    PLAN  1.-  Patient is counseled to adhere to a high-fiber diet of at least 30 g of fiber per day  2.-  Repeat colonoscopy is due in September 2022  3.-  Follow-up PRN.      I discussed the patient's findings and my recommendations with patient    Navin Daniel Aguirre MD  5/27/2020   08:55    Much of this note is an electronic transcription of spoken language to printed text. Electronic transcription of spoken language may permit erroneous, nonsensical word phrases to be inadvertently transcribed.  Although I have reviewed the note for these errors, some may still be present.

## 2020-05-29 ENCOUNTER — TELEPHONE (OUTPATIENT)
Dept: INTERNAL MEDICINE | Facility: CLINIC | Age: 65
End: 2020-05-29

## 2020-05-30 RX ORDER — OMEPRAZOLE 40 MG/1
40 CAPSULE, DELAYED RELEASE ORAL DAILY
Qty: 30 CAPSULE | Refills: 11 | Status: SHIPPED | OUTPATIENT
Start: 2020-05-30 | End: 2020-10-14 | Stop reason: ALTCHOICE

## 2020-06-11 ENCOUNTER — TELEPHONE (OUTPATIENT)
Dept: INTERNAL MEDICINE | Facility: CLINIC | Age: 65
End: 2020-06-11

## 2020-06-11 NOTE — TELEPHONE ENCOUNTER
PT CALLED REQUESTING A REFILL FOR:  DICLOFENAC CREAM FOR FOOT PAIN    [id number] - [place], [place] - [address] [phone] [facility] [phone] FX

## 2020-06-19 DIAGNOSIS — R25.2 LEG CRAMPS: ICD-10-CM

## 2020-06-19 RX ORDER — METHOCARBAMOL 500 MG/1
TABLET, FILM COATED ORAL
Qty: 30 TABLET | Refills: 5 | Status: SHIPPED | OUTPATIENT
Start: 2020-06-19 | End: 2020-10-14

## 2020-06-29 ENCOUNTER — APPOINTMENT (OUTPATIENT)
Dept: CT IMAGING | Facility: HOSPITAL | Age: 65
End: 2020-06-29

## 2020-06-29 ENCOUNTER — HOSPITAL ENCOUNTER (EMERGENCY)
Facility: HOSPITAL | Age: 65
Discharge: HOME | End: 2020-06-29
Attending: EMERGENCY MEDICINE | Admitting: EMERGENCY MEDICINE

## 2020-06-29 VITALS
BODY MASS INDEX: 36.1 KG/M2 | WEIGHT: 230 LBS | OXYGEN SATURATION: 93 % | HEIGHT: 67 IN | RESPIRATION RATE: 14 BRPM | TEMPERATURE: 98.1 F | DIASTOLIC BLOOD PRESSURE: 85 MMHG | HEART RATE: 69 BPM | SYSTOLIC BLOOD PRESSURE: 131 MMHG

## 2020-06-29 DIAGNOSIS — R10.9 RIGHT FLANK PAIN: ICD-10-CM

## 2020-06-29 DIAGNOSIS — N20.1 URETEROLITHIASIS: Primary | ICD-10-CM

## 2020-06-29 LAB
ALBUMIN SERPL-MCNC: 4.5 G/DL (ref 3.5–5.2)
ALBUMIN/GLOB SERPL: 2 G/DL
ALP SERPL-CCNC: 55 U/L (ref 39–117)
ALT SERPL W P-5'-P-CCNC: 9 U/L (ref 1–33)
ANION GAP SERPL CALCULATED.3IONS-SCNC: 10 MMOL/L (ref 5–15)
AST SERPL-CCNC: 20 U/L (ref 1–32)
BACTERIA UR QL AUTO: ABNORMAL /HPF
BASOPHILS # BLD AUTO: 0.06 10*3/MM3 (ref 0–0.2)
BASOPHILS NFR BLD AUTO: 0.6 % (ref 0–1.5)
BILIRUB SERPL-MCNC: 0.4 MG/DL (ref 0.2–1.2)
BILIRUB UR QL STRIP: NEGATIVE
BUN BLD-MCNC: 21 MG/DL (ref 8–23)
BUN/CREAT SERPL: 25.3 (ref 7–25)
CALCIUM SPEC-SCNC: 9.7 MG/DL (ref 8.6–10.5)
CHLORIDE SERPL-SCNC: 101 MMOL/L (ref 98–107)
CLARITY UR: CLEAR
CO2 SERPL-SCNC: 26 MMOL/L (ref 22–29)
COLOR UR: YELLOW
CREAT BLD-MCNC: 0.83 MG/DL (ref 0.57–1)
DEPRECATED RDW RBC AUTO: 50.1 FL (ref 37–54)
EOSINOPHIL # BLD AUTO: 0.07 10*3/MM3 (ref 0–0.4)
EOSINOPHIL NFR BLD AUTO: 0.7 % (ref 0.3–6.2)
ERYTHROCYTE [DISTWIDTH] IN BLOOD BY AUTOMATED COUNT: 15.3 % (ref 12.3–15.4)
GFR SERPL CREATININE-BSD FRML MDRD: 70 ML/MIN/1.73
GLOBULIN UR ELPH-MCNC: 2.3 GM/DL
GLUCOSE BLD-MCNC: 144 MG/DL (ref 65–99)
GLUCOSE UR STRIP-MCNC: NEGATIVE MG/DL
HCT VFR BLD AUTO: 37.5 % (ref 34–46.6)
HGB BLD-MCNC: 11.5 G/DL (ref 12–15.9)
HGB UR QL STRIP.AUTO: ABNORMAL
HOLD SPECIMEN: NORMAL
HOLD SPECIMEN: NORMAL
HYALINE CASTS UR QL AUTO: ABNORMAL /LPF
IMM GRANULOCYTES # BLD AUTO: 0.02 10*3/MM3 (ref 0–0.05)
IMM GRANULOCYTES NFR BLD AUTO: 0.2 % (ref 0–0.5)
KETONES UR QL STRIP: NEGATIVE
LEUKOCYTE ESTERASE UR QL STRIP.AUTO: ABNORMAL
LIPASE SERPL-CCNC: 35 U/L (ref 13–60)
LYMPHOCYTES # BLD AUTO: 1.08 10*3/MM3 (ref 0.7–3.1)
LYMPHOCYTES NFR BLD AUTO: 11.1 % (ref 19.6–45.3)
MCH RBC QN AUTO: 27.4 PG (ref 26.6–33)
MCHC RBC AUTO-ENTMCNC: 30.7 G/DL (ref 31.5–35.7)
MCV RBC AUTO: 89.5 FL (ref 79–97)
MONOCYTES # BLD AUTO: 0.5 10*3/MM3 (ref 0.1–0.9)
MONOCYTES NFR BLD AUTO: 5.1 % (ref 5–12)
NEUTROPHILS # BLD AUTO: 7.99 10*3/MM3 (ref 1.7–7)
NEUTROPHILS NFR BLD AUTO: 82.3 % (ref 42.7–76)
NITRITE UR QL STRIP: NEGATIVE
NRBC BLD AUTO-RTO: 0 /100 WBC (ref 0–0.2)
PH UR STRIP.AUTO: 7.5 [PH] (ref 5–8)
PLATELET # BLD AUTO: 282 10*3/MM3 (ref 140–450)
PMV BLD AUTO: 11.2 FL (ref 6–12)
POTASSIUM BLD-SCNC: 4.2 MMOL/L (ref 3.5–5.2)
PROT SERPL-MCNC: 6.8 G/DL (ref 6–8.5)
PROT UR QL STRIP: NEGATIVE
RBC # BLD AUTO: 4.19 10*6/MM3 (ref 3.77–5.28)
RBC # UR: ABNORMAL /HPF
REF LAB TEST METHOD: ABNORMAL
SODIUM BLD-SCNC: 137 MMOL/L (ref 136–145)
SP GR UR STRIP: 1.02 (ref 1–1.03)
SQUAMOUS #/AREA URNS HPF: ABNORMAL /HPF
UROBILINOGEN UR QL STRIP: ABNORMAL
WBC NRBC COR # BLD: 9.72 10*3/MM3 (ref 3.4–10.8)
WBC UR QL AUTO: ABNORMAL /HPF
WHOLE BLOOD HOLD SPECIMEN: NORMAL
WHOLE BLOOD HOLD SPECIMEN: NORMAL

## 2020-06-29 PROCEDURE — 85025 COMPLETE CBC W/AUTO DIFF WBC: CPT | Performed by: EMERGENCY MEDICINE

## 2020-06-29 PROCEDURE — 25010000002 KETOROLAC TROMETHAMINE PER 15 MG: Performed by: PHYSICIAN ASSISTANT

## 2020-06-29 PROCEDURE — 96375 TX/PRO/DX INJ NEW DRUG ADDON: CPT

## 2020-06-29 PROCEDURE — 74176 CT ABD & PELVIS W/O CONTRAST: CPT

## 2020-06-29 PROCEDURE — 36415 COLL VENOUS BLD VENIPUNCTURE: CPT

## 2020-06-29 PROCEDURE — 96374 THER/PROPH/DIAG INJ IV PUSH: CPT

## 2020-06-29 PROCEDURE — 83690 ASSAY OF LIPASE: CPT | Performed by: EMERGENCY MEDICINE

## 2020-06-29 PROCEDURE — 81001 URINALYSIS AUTO W/SCOPE: CPT | Performed by: EMERGENCY MEDICINE

## 2020-06-29 PROCEDURE — 80053 COMPREHEN METABOLIC PANEL: CPT | Performed by: EMERGENCY MEDICINE

## 2020-06-29 PROCEDURE — 99284 EMERGENCY DEPT VISIT MOD MDM: CPT

## 2020-06-29 PROCEDURE — 25010000002 ONDANSETRON PER 1 MG: Performed by: PHYSICIAN ASSISTANT

## 2020-06-29 RX ORDER — ONDANSETRON 2 MG/ML
4 INJECTION INTRAMUSCULAR; INTRAVENOUS ONCE
Status: COMPLETED | OUTPATIENT
Start: 2020-06-29 | End: 2020-06-29

## 2020-06-29 RX ORDER — HYDROMORPHONE HYDROCHLORIDE 2 MG/1
2 TABLET ORAL EVERY 4 HOURS PRN
Qty: 12 TABLET | Refills: 0 | Status: SHIPPED | OUTPATIENT
Start: 2020-06-29 | End: 2020-10-14

## 2020-06-29 RX ORDER — KETOROLAC TROMETHAMINE 15 MG/ML
15 INJECTION, SOLUTION INTRAMUSCULAR; INTRAVENOUS ONCE
Status: COMPLETED | OUTPATIENT
Start: 2020-06-29 | End: 2020-06-29

## 2020-06-29 RX ORDER — ONDANSETRON 4 MG/1
4 TABLET, ORALLY DISINTEGRATING ORAL EVERY 6 HOURS PRN
Qty: 12 TABLET | Refills: 0 | Status: SHIPPED | OUTPATIENT
Start: 2020-06-29 | End: 2020-10-14

## 2020-06-29 RX ORDER — SODIUM CHLORIDE 0.9 % (FLUSH) 0.9 %
10 SYRINGE (ML) INJECTION AS NEEDED
Status: DISCONTINUED | OUTPATIENT
Start: 2020-06-29 | End: 2020-06-29 | Stop reason: HOSPADM

## 2020-06-29 RX ADMIN — KETOROLAC TROMETHAMINE 15 MG: 15 INJECTION, SOLUTION INTRAMUSCULAR; INTRAVENOUS at 11:27

## 2020-06-29 RX ADMIN — SODIUM CHLORIDE 1000 ML: 9 INJECTION, SOLUTION INTRAVENOUS at 11:25

## 2020-06-29 RX ADMIN — ONDANSETRON 4 MG: 2 INJECTION INTRAMUSCULAR; INTRAVENOUS at 11:26

## 2020-07-11 DIAGNOSIS — G62.9 NEUROPATHY: ICD-10-CM

## 2020-07-11 DIAGNOSIS — G89.29 CHRONIC FOOT PAIN, LEFT: ICD-10-CM

## 2020-07-11 DIAGNOSIS — M79.672 CHRONIC FOOT PAIN, LEFT: ICD-10-CM

## 2020-07-15 RX ORDER — GABAPENTIN 600 MG/1
TABLET ORAL
Qty: 180 TABLET | Refills: 0 | Status: SHIPPED | OUTPATIENT
Start: 2020-07-15 | End: 2020-10-14 | Stop reason: SDUPTHER

## 2020-07-16 NOTE — TELEPHONE ENCOUNTER
She will be due for f/u in   October if she can schedule     PN she is due for a follow up October.  She verbalized understanding.

## 2020-10-03 PROBLEM — R06.2 WHEEZING: Status: RESOLVED | Noted: 2017-02-10 | Resolved: 2020-10-03

## 2020-10-03 PROBLEM — D64.9 ABSOLUTE ANEMIA: Status: ACTIVE | Noted: 2020-10-03

## 2020-10-10 DIAGNOSIS — E78.00 PURE HYPERCHOLESTEROLEMIA: ICD-10-CM

## 2020-10-12 RX ORDER — FENOFIBRATE 160 MG/1
TABLET ORAL
Qty: 30 TABLET | Refills: 5 | OUTPATIENT
Start: 2020-10-12

## 2020-10-14 ENCOUNTER — OFFICE VISIT (OUTPATIENT)
Dept: INTERNAL MEDICINE | Facility: CLINIC | Age: 65
End: 2020-10-14

## 2020-10-14 VITALS
BODY MASS INDEX: 38.25 KG/M2 | OXYGEN SATURATION: 98 % | DIASTOLIC BLOOD PRESSURE: 82 MMHG | HEIGHT: 66 IN | WEIGHT: 238 LBS | HEART RATE: 67 BPM | TEMPERATURE: 98 F | SYSTOLIC BLOOD PRESSURE: 134 MMHG

## 2020-10-14 DIAGNOSIS — D50.8 OTHER IRON DEFICIENCY ANEMIA: Primary | ICD-10-CM

## 2020-10-14 DIAGNOSIS — G62.9 NEUROPATHY: ICD-10-CM

## 2020-10-14 DIAGNOSIS — M79.672 CHRONIC FOOT PAIN, LEFT: ICD-10-CM

## 2020-10-14 DIAGNOSIS — E78.00 PURE HYPERCHOLESTEROLEMIA: ICD-10-CM

## 2020-10-14 DIAGNOSIS — E55.9 VITAMIN D DEFICIENCY: ICD-10-CM

## 2020-10-14 DIAGNOSIS — G89.29 CHRONIC FOOT PAIN, LEFT: ICD-10-CM

## 2020-10-14 PROCEDURE — 99214 OFFICE O/P EST MOD 30 MIN: CPT | Performed by: INTERNAL MEDICINE

## 2020-10-14 RX ORDER — GABAPENTIN 600 MG/1
TABLET ORAL
Qty: 180 TABLET | Refills: 1 | Status: SHIPPED | OUTPATIENT
Start: 2020-10-14 | End: 2021-04-22 | Stop reason: SDUPTHER

## 2020-10-14 RX ORDER — FENOFIBRATE 160 MG/1
160 TABLET ORAL DAILY
Qty: 30 TABLET | Refills: 5 | Status: CANCELLED | OUTPATIENT
Start: 2020-10-14

## 2020-10-14 RX ORDER — RABEPRAZOLE SODIUM 20 MG/1
20 TABLET, DELAYED RELEASE ORAL DAILY
COMMUNITY
Start: 2020-09-05 | End: 2021-06-16

## 2020-10-14 NOTE — PROGRESS NOTES
Subjective   Jyoit Puga is a 65 y.o. female.     History of Present Illness     Here for f/u on:    Hyperlipidemia - she is on fenofibrate. She has eaten today. Diet is not good-has been eating more chips and junk food. 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  She has also been using voltaren gtl     fe def anemia - had endoscopy done in 2/20 which showed antral ulcers. She did have CBC 3m ago and h/h was 11/33  She has been on fe daily, mvi daily. Stool is dark at times and has been more constipated since she had a kidney stone and then the diverticulitis    Asthma - she is using albuterol prn       Current Outpatient Medications on File Prior to Visit   Medication Sig Dispense Refill   • albuterol sulfate  (90 Base) MCG/ACT inhaler Inhale 2 puffs Every 4 (Four) Hours As Needed for Wheezing. 1 inhaler 11   • Cholecalciferol (vitamin D3) 125 MCG (5000 UT) capsule capsule Take 5,000 Units by mouth Every 7 (Seven) Days.     • diclofenac (VOLTAREN) 1 % gel gel Apply 4 g topically to the appropriate area as directed 3 (Three) Times a Day. Apply to foot 100 g 1   • fenofibrate 160 MG tablet Take 1 tablet by mouth Daily. 30 tablet 5   • Fluticasone Furoate-Vilanterol (BREO ELLIPTA) 100-25 MCG/INH inhaler Inhale 1 puff Daily. 1 inhaler 11   • gabapentin (NEURONTIN) 600 MG tablet TAKE 1 TABLET BY MOUTH TWICE A  tablet 0   • [DISCONTINUED] omeprazole (PrilOSEC) 40 MG capsule Take 1 capsule by mouth Daily. 30 capsule 11   • Cholecalciferol (VITAMIN D3) 19911 units capsule Take 1 capsule by mouth Every 7 (Seven) Days. 4 capsule 2   • HYDROcodone-acetaminophen (NORCO) 7.5-325 MG per tablet TK 1 T PO  Q 6 H PRF PAIN     • HYDROmorphone (DILAUDID) 2 MG tablet Take 1 tablet by mouth Every 4 (Four) Hours As Needed for Moderate Pain . 12 tablet 0   • HYDROmorphone (DILAUDID) 2 MG tablet Take 1 tablet by mouth Every 4 (Four) Hours As  "Needed for Severe Pain . 12 tablet 0   • methocarbamol (ROBAXIN) 500 MG tablet TAKE 1 TABLET BY MOUTH EVERY EVENING AS NEEDED FOR MUSCLE CRAMPS 30 tablet 5   • ondansetron ODT (ZOFRAN-ODT) 4 MG disintegrating tablet Place 1 tablet on the tongue Every 6 (Six) Hours As Needed for Nausea. 12 tablet 0   • RABEprazole (ACIPHEX) 20 MG EC tablet Take 20 mg by mouth Daily.     • tamsulosin (FLOMAX) 0.4 MG capsule 24 hr capsule Take 1 capsule by mouth Every Night. 30 capsule 0   • [DISCONTINUED] ondansetron ODT (ZOFRAN-ODT) 4 MG disintegrating tablet DIS ONE T PO  TID       No current facility-administered medications on file prior to visit.        The following portions of the patient's history were reviewed and updated as appropriate: allergies, current medications, past family history, past medical history, past social history, past surgical history and problem list.    Review of Systems   Constitutional: Positive for unexpected weight loss.   Gastrointestinal: Positive for constipation (has had more trouble once a week, straining). Negative for diarrhea.       Objective   /82 (BP Location: Right arm, Patient Position: Sitting)   Pulse 67   Temp 98 °F (36.7 °C) (Infrared)   Ht 167 cm (65.75\")   Wt 108 kg (238 lb)   SpO2 98%   BMI 38.71 kg/m²   Physical Exam  Constitutional:       General: She is not in acute distress.     Appearance: She is well-developed. She is not diaphoretic.   HENT:      Head: Normocephalic and atraumatic.   Eyes:      Conjunctiva/sclera: Conjunctivae normal.   Cardiovascular:      Rate and Rhythm: Normal rate and regular rhythm.      Heart sounds: Normal heart sounds. No murmur. No friction rub. No gallop.    Pulmonary:      Effort: Pulmonary effort is normal. No respiratory distress.      Breath sounds: Normal breath sounds. No wheezing.   Skin:     General: Skin is warm and dry.   Neurological:      Mental Status: She is alert and oriented to person, place, and time.   Psychiatric:    "      Behavior: Behavior normal.         Thought Content: Thought content normal.         Judgment: Judgment normal.           Assessment/Plan   Diagnoses and all orders for this visit:    1. Other iron deficiency anemia (Primary)-we will recheck iron levels and CBC.  She has completed a GI work-up and did have antral ulcers   -     Iron Profile; Future  -     Ferritin; Future  -     CBC (No Diff); Future    2. Pure hypercholesterolemia-on fenofibrate.  She will return for fasting labs.  Try to improve diet    -     Comprehensive Metabolic Panel; Future  -     Lipid Panel; Future    3. Chronic foot pain, left-better with gabapentin.  We will continue  Comments:    She does have neuropathy and S1 radiculopathy  Orders:  -     gabapentin (NEURONTIN) 600 MG tablet; TAKE 1 TABLET BY MOUTH TWICE A DAY  Dispense: 180 tablet; Refill: 1    4. Neuropathy  -     gabapentin (NEURONTIN) 600 MG tablet; TAKE 1 TABLET BY MOUTH TWICE A DAY  Dispense: 180 tablet; Refill: 1    5. Vitamin D deficiency-recheck vitamin D level  -     Vitamin D 25 Hydroxy; Future    Other orders    -     diclofenac (VOLTAREN) 1 % gel gel; Apply 4 g topically to the appropriate area as directed 3 (Three) Times a Day. Apply to foot  Dispense: 100 g; Refill: 11      Pt has already signed controlled substance contract. Óscar done today and appropriate.  She declines flu shot today

## 2020-10-15 ENCOUNTER — LAB (OUTPATIENT)
Dept: LAB | Facility: HOSPITAL | Age: 65
End: 2020-10-15

## 2020-10-15 DIAGNOSIS — D50.8 OTHER IRON DEFICIENCY ANEMIA: ICD-10-CM

## 2020-10-15 DIAGNOSIS — E55.9 VITAMIN D DEFICIENCY: ICD-10-CM

## 2020-10-15 DIAGNOSIS — E78.00 PURE HYPERCHOLESTEROLEMIA: ICD-10-CM

## 2020-10-15 LAB
25(OH)D3 SERPL-MCNC: 44.9 NG/ML (ref 30–100)
ALBUMIN SERPL-MCNC: 4.2 G/DL (ref 3.5–5.2)
ALBUMIN/GLOB SERPL: 1.7 G/DL
ALP SERPL-CCNC: 53 U/L (ref 39–117)
ALT SERPL W P-5'-P-CCNC: 15 U/L (ref 1–33)
ANION GAP SERPL CALCULATED.3IONS-SCNC: 6.1 MMOL/L (ref 5–15)
AST SERPL-CCNC: 23 U/L (ref 1–32)
BILIRUB SERPL-MCNC: 0.4 MG/DL (ref 0–1.2)
BUN SERPL-MCNC: 13 MG/DL (ref 8–23)
BUN/CREAT SERPL: 21 (ref 7–25)
CALCIUM SPEC-SCNC: 10.2 MG/DL (ref 8.6–10.5)
CHLORIDE SERPL-SCNC: 103 MMOL/L (ref 98–107)
CHOLEST SERPL-MCNC: 193 MG/DL (ref 0–200)
CO2 SERPL-SCNC: 29.9 MMOL/L (ref 22–29)
CREAT SERPL-MCNC: 0.62 MG/DL (ref 0.57–1)
DEPRECATED RDW RBC AUTO: 47.7 FL (ref 37–54)
ERYTHROCYTE [DISTWIDTH] IN BLOOD BY AUTOMATED COUNT: 15.5 % (ref 12.3–15.4)
FERRITIN SERPL-MCNC: 28 NG/ML (ref 13–150)
GFR SERPL CREATININE-BSD FRML MDRD: 97 ML/MIN/1.73
GLOBULIN UR ELPH-MCNC: 2.5 GM/DL
GLUCOSE SERPL-MCNC: 86 MG/DL (ref 65–99)
HCT VFR BLD AUTO: 40 % (ref 34–46.6)
HDLC SERPL-MCNC: 64 MG/DL (ref 40–60)
HGB BLD-MCNC: 13.2 G/DL (ref 12–15.9)
IRON 24H UR-MRATE: 134 MCG/DL (ref 37–145)
IRON SATN MFR SERPL: 28 % (ref 20–50)
LDLC SERPL CALC-MCNC: 112 MG/DL (ref 0–100)
LDLC/HDLC SERPL: 1.72 {RATIO}
MCH RBC QN AUTO: 28.1 PG (ref 26.6–33)
MCHC RBC AUTO-ENTMCNC: 33 G/DL (ref 31.5–35.7)
MCV RBC AUTO: 85.3 FL (ref 79–97)
PLATELET # BLD AUTO: 229 10*3/MM3 (ref 140–450)
PMV BLD AUTO: 11.3 FL (ref 6–12)
POTASSIUM SERPL-SCNC: 4.7 MMOL/L (ref 3.5–5.2)
PROT SERPL-MCNC: 6.7 G/DL (ref 6–8.5)
RBC # BLD AUTO: 4.69 10*6/MM3 (ref 3.77–5.28)
SODIUM SERPL-SCNC: 139 MMOL/L (ref 136–145)
TIBC SERPL-MCNC: 481 MCG/DL (ref 298–536)
TRANSFERRIN SERPL-MCNC: 323 MG/DL (ref 200–360)
TRIGL SERPL-MCNC: 94 MG/DL (ref 0–150)
VLDLC SERPL-MCNC: 17 MG/DL (ref 5–40)
WBC # BLD AUTO: 5.11 10*3/MM3 (ref 3.4–10.8)

## 2020-10-15 PROCEDURE — 83540 ASSAY OF IRON: CPT

## 2020-10-15 PROCEDURE — 84466 ASSAY OF TRANSFERRIN: CPT

## 2020-10-15 PROCEDURE — 82728 ASSAY OF FERRITIN: CPT

## 2020-10-15 PROCEDURE — 85027 COMPLETE CBC AUTOMATED: CPT

## 2020-10-15 PROCEDURE — 82306 VITAMIN D 25 HYDROXY: CPT

## 2020-10-15 PROCEDURE — 80053 COMPREHEN METABOLIC PANEL: CPT

## 2020-10-15 PROCEDURE — 80061 LIPID PANEL: CPT

## 2020-10-18 DIAGNOSIS — E78.00 PURE HYPERCHOLESTEROLEMIA: ICD-10-CM

## 2020-10-18 RX ORDER — FENOFIBRATE 160 MG/1
160 TABLET ORAL DAILY
Qty: 30 TABLET | Refills: 5 | Status: SHIPPED | OUTPATIENT
Start: 2020-10-18 | End: 2021-04-23

## 2021-04-17 DIAGNOSIS — R06.02 SOB (SHORTNESS OF BREATH): ICD-10-CM

## 2021-04-18 NOTE — PROGRESS NOTES
Here for physical    Exercise: not currently  Diet:healthy; fasting today; vi t D ,B12 intermittently. Fe occasionally  etoh - rare    Hyperlipidemia - she is on fenofibrate and triglycerides were excellent when we checked last 6 months ago     Peripheral neuropathy - she is on gabapentin 600 bid- does seem to help.  She has also been using voltaren gel. She did have NCV done in  11/18 that showed mild axonal Peripheral neuropathy and prolonged H reflex on left  suggestive for a S1 radiculopathy versus sciatic nerve neuropathy. We had started gabapentin after the EMG.   She does have a lot of low back pain, and at times has had sciatica pain usually down the left leg    fe def anemia - had endoscopy done in 2/20 which showed antral ulcers.  She uses the  iron intermittently    Asthma - she is using albuterol prn and breo daily which does seem to help    R foot pain - she was getting a tiller out of her truck and tiller hit foot. She did not go to ER. Has been swollen and tender. But has been able to walk.  Swelling has gone down the last few days    Nephrolithiasis-She sees her urologist in [place] soon      Current Outpatient Medications:   •  albuterol sulfate  (90 Base) MCG/ACT inhaler, Inhale 2 puffs Every 4 (Four) Hours As Needed for Wheezing., Disp: 1 inhaler, Rfl: 11  •  Breo Ellipta 100-25 MCG/INH inhaler, TAKE 1 PUFF BY MOUTH EVERY DAY, Disp: 60 each, Rfl: 11  •  Cholecalciferol (vitamin D3) 125 MCG (5000 UT) capsule capsule, Take 5,000 Units by mouth Every 7 (Seven) Days., Disp: , Rfl:   •  diclofenac (VOLTAREN) 1 % gel gel, Apply 4 g topically to the appropriate area as directed 3 (Three) Times a Day. Apply to foot, Disp: 100 g, Rfl: 11  •  fenofibrate 160 MG tablet, Take 1 tablet by mouth Daily., Disp: 30 tablet, Rfl: 5  •  gabapentin (NEURONTIN) 600 MG tablet, TAKE 1 TABLET BY MOUTH TWICE A DAY, Disp: 180 tablet, Rfl: 1  •  hydroCHLOROthiazide (HYDRODIURIL) 25 MG tablet, Take 25 mg by mouth 2  "(two) times a day. Patient states she is taking once a day., Disp: , Rfl:   •  methocarbamol (ROBAXIN) 500 MG tablet, 1 tablet Daily As Needed., Disp: , Rfl:   •  RABEprazole (ACIPHEX) 20 MG EC tablet, Take 20 mg by mouth Daily., Disp: , Rfl:     The following portions of the patient's history were reviewed and updated as appropriate: allergies, current medications, past family history, past medical history, past social history, past surgical history and problem list.    Review of Systems   Constitutional: Negative for activity change, appetite change, fever, unexpected weight gain and unexpected weight loss.   HENT: Negative.    Eyes: Negative.    Respiratory: Negative for shortness of breath and wheezing.    Cardiovascular: Negative for chest pain, palpitations and leg swelling.   Gastrointestinal: Negative.    Endocrine: Negative.    Genitourinary: Negative for difficulty urinating and dysuria.   Musculoskeletal: Positive for arthralgias, joint swelling (r foot) and myalgias.   Skin: Negative.    Allergic/Immunologic: Negative for immunocompromised state.   Neurological: Negative for seizures, speech difficulty, memory problem and confusion.   Hematological: Does not bruise/bleed easily.   Psychiatric/Behavioral: Negative for agitation.         Objective    /76 (BP Location: Right arm, Patient Position: Sitting)   Pulse 65   Temp 97.3 °F (36.3 °C) (Infrared)   Ht 166.9 cm (65.7\")   Wt 105 kg (232 lb 6.4 oz)   SpO2 99%   BMI 37.85 kg/m²   Physical Exam   Physical Exam  Vitals and nursing note reviewed.   Constitutional:       General: She is not in acute distress.     Appearance: She is well-developed. She is not diaphoretic.   HENT:      Head: Normocephalic and atraumatic.      Right Ear: External ear normal.      Left Ear: External ear normal.      Nose: Nose normal.      Mouth/Throat:      Pharynx: No oropharyngeal exudate.   Eyes:      General: No scleral icterus.        Right eye: No discharge.    "      Left eye: No discharge.      Conjunctiva/sclera: Conjunctivae normal.      Pupils: Pupils are equal, round, and reactive to light.   Neck:      Thyroid: No thyromegaly.   Cardiovascular:      Rate and Rhythm: Normal rate and regular rhythm.      Heart sounds: Normal heart sounds. No murmur heard.   No friction rub. No gallop.    Pulmonary:      Effort: Pulmonary effort is normal. No respiratory distress.      Breath sounds: Normal breath sounds. No wheezing or rales.   Chest:      Breasts:         Right: No mass, nipple discharge, skin change or tenderness.         Left: No mass, nipple discharge, skin change or tenderness.   Abdominal:      General: Bowel sounds are normal. There is no distension.      Palpations: Abdomen is soft. There is no mass.      Tenderness: There is no abdominal tenderness. There is no guarding or rebound.   Musculoskeletal:         General: Swelling (right medial mallwolus) present. No deformity. Normal range of motion.      Cervical back: Normal range of motion and neck supple.   Lymphadenopathy:      Cervical: No cervical adenopathy.   Skin:     General: Skin is warm and dry.      Coloration: Skin is not pale.      Findings: No erythema or rash.   Neurological:      Mental Status: She is alert and oriented to person, place, and time.      Coordination: Coordination normal.      Deep Tendon Reflexes: Reflexes normal.   Psychiatric:         Mood and Affect: Mood normal.         Behavior: Behavior normal.         Thought Content: Thought content normal.         Judgment: Judgment normal.           Assessment/Plan   Diagnoses and all orders for this visit:    1. Routine general medical examination at a health care facility (Primary)  Regular exercise/healthy diet. BSE q month. Sunscreen use encouraged. calcium intake reviewed. Check fasting labs  Jong due now - SJ - and she has it scheduled for June  Colon due in 3/22 (castallanos)  DT due 5/28  DEXA due now  (normal in 5/17)  Shingles -  shingrix discussed -  can check at pharmacy since we do not have   She had pneumovax (smoker) in '17, will give prevnar age 67  CT lung cancer screen due now-we will schedule  She does not need paps as she had hysterectomy for benign reasons  encouraged her to get COVID vaccine  Handicap form filled out for neuropathy and asthma  -     CBC (No Diff); Future  -     TSH Rfx On Abnormal To Free T4; Future  -     Lipid Panel; Future  -     Comprehensive Metabolic Panel; Future  -     POC Urinalysis Dipstick, Automated    2. Personal history of tobacco use, presenting hazards to health  -     CT chest low dose wo; Future    3.  Low back pain-left sciatica-chronic foot pain, left-she would like to try some PT  -     Ambulatory Referral to Physical Therapy Evaluate and treat    4.  Neuropathy-gabapentin has helped.  Refill today.  Óscar appropriate  -     gabapentin (NEURONTIN) 600 MG tablet; TAKE 1 TABLET BY MOUTH TWICE A DAY  Dispense: 180 tablet; Refill: 1    5. Screening for osteoporosis  -     DEXA Bone Density Axial; Future    6. Foot pain, right  Comments:  Recent injury-swelling and tenderness have improved.  Do not think we need x-ray at this point.  Call if no better

## 2021-04-20 NOTE — TELEPHONE ENCOUNTER
Last Office Visit:  10/14/2020  Next Office Visit: 4/22/2021    Medication: Fluticasone Furoate-Vilanterol inhaler   Last Refill Date: 2/20/2020  Quantity: 1 inhaler   Refills: 11    Pharmacy: Pharmacy:  [id number] - [address] [phone] [facility] [phone]     Please review pended refill request for any changes needed on refills or quantities. Thank you!

## 2021-04-22 ENCOUNTER — LAB (OUTPATIENT)
Dept: LAB | Facility: HOSPITAL | Age: 66
End: 2021-04-22

## 2021-04-22 ENCOUNTER — OFFICE VISIT (OUTPATIENT)
Dept: INTERNAL MEDICINE | Facility: CLINIC | Age: 66
End: 2021-04-22

## 2021-04-22 VITALS
HEART RATE: 65 BPM | HEIGHT: 66 IN | OXYGEN SATURATION: 99 % | SYSTOLIC BLOOD PRESSURE: 124 MMHG | DIASTOLIC BLOOD PRESSURE: 76 MMHG | BODY MASS INDEX: 37.35 KG/M2 | WEIGHT: 232.4 LBS | TEMPERATURE: 97.3 F

## 2021-04-22 DIAGNOSIS — Z00.00 ROUTINE GENERAL MEDICAL EXAMINATION AT A HEALTH CARE FACILITY: ICD-10-CM

## 2021-04-22 DIAGNOSIS — G89.29 CHRONIC FOOT PAIN, LEFT: ICD-10-CM

## 2021-04-22 DIAGNOSIS — E78.00 PURE HYPERCHOLESTEROLEMIA: ICD-10-CM

## 2021-04-22 DIAGNOSIS — Z00.00 ROUTINE GENERAL MEDICAL EXAMINATION AT A HEALTH CARE FACILITY: Primary | ICD-10-CM

## 2021-04-22 DIAGNOSIS — G62.9 NEUROPATHY: ICD-10-CM

## 2021-04-22 DIAGNOSIS — Z87.891 PERSONAL HISTORY OF TOBACCO USE, PRESENTING HAZARDS TO HEALTH: ICD-10-CM

## 2021-04-22 DIAGNOSIS — Z13.820 SCREENING FOR OSTEOPOROSIS: ICD-10-CM

## 2021-04-22 DIAGNOSIS — M54.42 CHRONIC BILATERAL LOW BACK PAIN WITH LEFT-SIDED SCIATICA: ICD-10-CM

## 2021-04-22 DIAGNOSIS — M79.672 CHRONIC FOOT PAIN, LEFT: ICD-10-CM

## 2021-04-22 DIAGNOSIS — G89.29 CHRONIC BILATERAL LOW BACK PAIN WITH LEFT-SIDED SCIATICA: ICD-10-CM

## 2021-04-22 DIAGNOSIS — M79.671 FOOT PAIN, RIGHT: ICD-10-CM

## 2021-04-22 LAB
ALBUMIN SERPL-MCNC: 4.1 G/DL (ref 3.5–5.2)
ALBUMIN/GLOB SERPL: 1.7 G/DL
ALP SERPL-CCNC: 47 U/L (ref 39–117)
ALT SERPL W P-5'-P-CCNC: 16 U/L (ref 1–33)
ANION GAP SERPL CALCULATED.3IONS-SCNC: 9.8 MMOL/L (ref 5–15)
AST SERPL-CCNC: 25 U/L (ref 1–32)
BILIRUB BLD-MCNC: NEGATIVE MG/DL
BILIRUB SERPL-MCNC: 0.6 MG/DL (ref 0–1.2)
BUN SERPL-MCNC: 18 MG/DL (ref 8–23)
BUN/CREAT SERPL: 27.7 (ref 7–25)
CALCIUM SPEC-SCNC: 9.5 MG/DL (ref 8.6–10.5)
CHLORIDE SERPL-SCNC: 98 MMOL/L (ref 98–107)
CHOLEST SERPL-MCNC: 181 MG/DL (ref 0–200)
CLARITY, POC: CLEAR
CO2 SERPL-SCNC: 29.2 MMOL/L (ref 22–29)
COLOR UR: YELLOW
CREAT SERPL-MCNC: 0.65 MG/DL (ref 0.57–1)
DEPRECATED RDW RBC AUTO: 40.3 FL (ref 37–54)
ERYTHROCYTE [DISTWIDTH] IN BLOOD BY AUTOMATED COUNT: 12.6 % (ref 12.3–15.4)
GFR SERPL CREATININE-BSD FRML MDRD: 92 ML/MIN/1.73
GLOBULIN UR ELPH-MCNC: 2.4 GM/DL
GLUCOSE SERPL-MCNC: 84 MG/DL (ref 65–99)
GLUCOSE UR STRIP-MCNC: NEGATIVE MG/DL
HCT VFR BLD AUTO: 41.2 % (ref 34–46.6)
HDLC SERPL-MCNC: 53 MG/DL (ref 40–60)
HGB BLD-MCNC: 14 G/DL (ref 12–15.9)
KETONES UR QL: NEGATIVE
LDLC SERPL CALC-MCNC: 107 MG/DL (ref 0–100)
LDLC/HDLC SERPL: 1.97 {RATIO}
LEUKOCYTE EST, POC: NEGATIVE
MCH RBC QN AUTO: 30 PG (ref 26.6–33)
MCHC RBC AUTO-ENTMCNC: 34 G/DL (ref 31.5–35.7)
MCV RBC AUTO: 88.2 FL (ref 79–97)
NITRITE UR-MCNC: NEGATIVE MG/ML
PH UR: 6 [PH] (ref 5–8)
PLATELET # BLD AUTO: 252 10*3/MM3 (ref 140–450)
PMV BLD AUTO: 11.4 FL (ref 6–12)
POTASSIUM SERPL-SCNC: 4 MMOL/L (ref 3.5–5.2)
PROT SERPL-MCNC: 6.5 G/DL (ref 6–8.5)
PROT UR STRIP-MCNC: NEGATIVE MG/DL
RBC # BLD AUTO: 4.67 10*6/MM3 (ref 3.77–5.28)
RBC # UR STRIP: NEGATIVE /UL
SODIUM SERPL-SCNC: 137 MMOL/L (ref 136–145)
SP GR UR: 1.02 (ref 1–1.03)
TRIGL SERPL-MCNC: 117 MG/DL (ref 0–150)
TSH SERPL DL<=0.05 MIU/L-ACNC: 1.73 UIU/ML (ref 0.27–4.2)
UROBILINOGEN UR QL: NORMAL
VLDLC SERPL-MCNC: 21 MG/DL (ref 5–40)
WBC # BLD AUTO: 5.31 10*3/MM3 (ref 3.4–10.8)

## 2021-04-22 PROCEDURE — 81003 URINALYSIS AUTO W/O SCOPE: CPT | Performed by: INTERNAL MEDICINE

## 2021-04-22 PROCEDURE — 99396 PREV VISIT EST AGE 40-64: CPT | Performed by: INTERNAL MEDICINE

## 2021-04-22 PROCEDURE — 80061 LIPID PANEL: CPT | Performed by: INTERNAL MEDICINE

## 2021-04-22 PROCEDURE — 80053 COMPREHEN METABOLIC PANEL: CPT | Performed by: INTERNAL MEDICINE

## 2021-04-22 PROCEDURE — 84443 ASSAY THYROID STIM HORMONE: CPT | Performed by: INTERNAL MEDICINE

## 2021-04-22 PROCEDURE — 85027 COMPLETE CBC AUTOMATED: CPT | Performed by: INTERNAL MEDICINE

## 2021-04-22 RX ORDER — GABAPENTIN 600 MG/1
TABLET ORAL
Qty: 180 TABLET | Refills: 1 | Status: SHIPPED | OUTPATIENT
Start: 2021-04-22 | End: 2021-10-02 | Stop reason: SDUPTHER

## 2021-04-22 RX ORDER — METHOCARBAMOL 500 MG/1
1 TABLET, FILM COATED ORAL DAILY PRN
COMMUNITY
Start: 2021-03-09 | End: 2021-10-25

## 2021-04-22 RX ORDER — HYDROCHLOROTHIAZIDE 25 MG/1
25 TABLET ORAL 2 TIMES DAILY
COMMUNITY
Start: 2021-02-20 | End: 2021-10-25

## 2021-04-23 RX ORDER — FENOFIBRATE 160 MG/1
TABLET ORAL
Qty: 90 TABLET | Refills: 1 | Status: SHIPPED | OUTPATIENT
Start: 2021-04-23 | End: 2021-10-02 | Stop reason: SDUPTHER

## 2021-05-10 ENCOUNTER — HOSPITAL ENCOUNTER (OUTPATIENT)
Dept: CT IMAGING | Facility: HOSPITAL | Age: 66
Discharge: HOME | End: 2021-05-10
Admitting: INTERNAL MEDICINE

## 2021-05-10 DIAGNOSIS — Z87.891 PERSONAL HISTORY OF TOBACCO USE, PRESENTING HAZARDS TO HEALTH: ICD-10-CM

## 2021-05-10 PROCEDURE — 71271 CT THORAX LUNG CANCER SCR C-: CPT

## 2021-05-14 ENCOUNTER — TELEPHONE (OUTPATIENT)
Dept: INTERNAL MEDICINE | Facility: CLINIC | Age: 66
End: 2021-05-14

## 2021-05-14 NOTE — TELEPHONE ENCOUNTER
----- Message from Waleska Miller MD sent at 5/14/2021  7:56 AM EDT -----  Can you let pt know, her CT chest is stable and we can plan to repeat in 1 year

## 2021-06-16 RX ORDER — RABEPRAZOLE SODIUM 20 MG/1
TABLET, DELAYED RELEASE ORAL
Qty: 90 TABLET | Refills: 0 | OUTPATIENT
Start: 2021-06-16

## 2021-06-16 RX ORDER — RABEPRAZOLE SODIUM 20 MG/1
TABLET, DELAYED RELEASE ORAL
Qty: 90 TABLET | Refills: 3 | Status: SHIPPED | OUTPATIENT
Start: 2021-06-16 | End: 2022-04-27 | Stop reason: SDUPTHER

## 2021-06-17 DIAGNOSIS — K22.70 BARRETT'S ESOPHAGUS WITHOUT DYSPLASIA: ICD-10-CM

## 2021-06-17 RX ORDER — RABEPRAZOLE SODIUM 20 MG/1
TABLET, DELAYED RELEASE ORAL
Qty: 90 TABLET | Refills: 3 | OUTPATIENT
Start: 2021-06-17

## 2021-06-24 ENCOUNTER — TRANSCRIBE ORDERS (OUTPATIENT)
Dept: ADMINISTRATIVE | Facility: HOSPITAL | Age: 66
End: 2021-06-24

## 2021-06-24 DIAGNOSIS — Z12.31 VISIT FOR SCREENING MAMMOGRAM: Primary | ICD-10-CM

## 2021-07-15 ENCOUNTER — HOSPITAL ENCOUNTER (OUTPATIENT)
Dept: BONE DENSITY | Facility: HOSPITAL | Age: 66
Discharge: HOME | End: 2021-07-15
Admitting: INTERNAL MEDICINE

## 2021-07-15 DIAGNOSIS — Z13.820 SCREENING FOR OSTEOPOROSIS: ICD-10-CM

## 2021-07-15 PROCEDURE — 77080 DXA BONE DENSITY AXIAL: CPT

## 2021-08-02 ENCOUNTER — HOSPITAL ENCOUNTER (OUTPATIENT)
Dept: MAMMOGRAPHY | Facility: HOSPITAL | Age: 66
Discharge: HOME | End: 2021-08-02
Admitting: INTERNAL MEDICINE

## 2021-08-02 ENCOUNTER — APPOINTMENT (OUTPATIENT)
Dept: OTHER | Facility: HOSPITAL | Age: 66
End: 2021-08-02

## 2021-08-02 DIAGNOSIS — Z12.31 VISIT FOR SCREENING MAMMOGRAM: ICD-10-CM

## 2021-08-02 PROCEDURE — 77063 BREAST TOMOSYNTHESIS BI: CPT | Performed by: RADIOLOGY

## 2021-08-02 PROCEDURE — 77067 SCR MAMMO BI INCL CAD: CPT | Performed by: RADIOLOGY

## 2021-08-02 PROCEDURE — 77067 SCR MAMMO BI INCL CAD: CPT

## 2021-08-02 PROCEDURE — 77063 BREAST TOMOSYNTHESIS BI: CPT

## 2021-10-02 DIAGNOSIS — R06.02 SOB (SHORTNESS OF BREATH): ICD-10-CM

## 2021-10-02 DIAGNOSIS — E78.00 PURE HYPERCHOLESTEROLEMIA: ICD-10-CM

## 2021-10-02 DIAGNOSIS — G62.9 NEUROPATHY: ICD-10-CM

## 2021-10-04 DIAGNOSIS — E78.00 PURE HYPERCHOLESTEROLEMIA: ICD-10-CM

## 2021-10-04 RX ORDER — GABAPENTIN 600 MG/1
TABLET ORAL
Qty: 60 TABLET | Refills: 0 | Status: SHIPPED | OUTPATIENT
Start: 2021-10-04 | End: 2021-11-24 | Stop reason: SDUPTHER

## 2021-10-04 RX ORDER — FENOFIBRATE 160 MG/1
TABLET ORAL
Qty: 90 TABLET | Refills: 1 | OUTPATIENT
Start: 2021-10-04

## 2021-10-04 RX ORDER — FENOFIBRATE 160 MG/1
160 TABLET ORAL DAILY
Qty: 30 TABLET | Refills: 0 | Status: SHIPPED | OUTPATIENT
Start: 2021-10-04 | End: 2021-10-25 | Stop reason: SDUPTHER

## 2021-10-04 RX ORDER — ALBUTEROL SULFATE 90 UG/1
2 AEROSOL, METERED RESPIRATORY (INHALATION) EVERY 4 HOURS PRN
Qty: 18 G | Refills: 0 | Status: SHIPPED | OUTPATIENT
Start: 2021-10-04 | End: 2021-10-25 | Stop reason: SDUPTHER

## 2021-10-04 NOTE — TELEPHONE ENCOUNTER
Rx Refill Note  Requested Prescriptions     Pending Prescriptions Disp Refills   • albuterol sulfate  (90 Base) MCG/ACT inhaler       Sig: Inhale 2 puffs Every 4 (Four) Hours As Needed for Wheezing.   • fenofibrate 160 MG tablet 90 tablet 1     Sig: Take 1 tablet by mouth Daily.   • gabapentin (NEURONTIN) 600 MG tablet 180 tablet 1     Sig: TAKE 1 TABLET BY MOUTH TWICE A DAY      Last office visit with prescribing clinician: 4/22/2021      Next office visit with prescribing clinician: 10/25/2021     uds not up to date  csa not up to date       Lady Lane MA  10/04/21, 11:47 EDT

## 2021-10-25 ENCOUNTER — LAB (OUTPATIENT)
Dept: LAB | Facility: HOSPITAL | Age: 66
End: 2021-10-25

## 2021-10-25 ENCOUNTER — HOSPITAL ENCOUNTER (OUTPATIENT)
Dept: GENERAL RADIOLOGY | Facility: HOSPITAL | Age: 66
Discharge: HOME | End: 2021-10-25

## 2021-10-25 ENCOUNTER — OFFICE VISIT (OUTPATIENT)
Dept: INTERNAL MEDICINE | Facility: CLINIC | Age: 66
End: 2021-10-25

## 2021-10-25 VITALS
WEIGHT: 244.6 LBS | HEART RATE: 70 BPM | HEIGHT: 66 IN | DIASTOLIC BLOOD PRESSURE: 84 MMHG | SYSTOLIC BLOOD PRESSURE: 124 MMHG | TEMPERATURE: 97.7 F | BODY MASS INDEX: 39.31 KG/M2 | OXYGEN SATURATION: 96 %

## 2021-10-25 DIAGNOSIS — R25.2 LEG CRAMPS: ICD-10-CM

## 2021-10-25 DIAGNOSIS — G60.9 IDIOPATHIC PERIPHERAL NEUROPATHY: Primary | Chronic | ICD-10-CM

## 2021-10-25 DIAGNOSIS — M79.672 BILATERAL FOOT PAIN: ICD-10-CM

## 2021-10-25 DIAGNOSIS — R06.02 SOB (SHORTNESS OF BREATH): ICD-10-CM

## 2021-10-25 DIAGNOSIS — M25.512 ACUTE PAIN OF LEFT SHOULDER: ICD-10-CM

## 2021-10-25 DIAGNOSIS — E78.00 PURE HYPERCHOLESTEROLEMIA: ICD-10-CM

## 2021-10-25 DIAGNOSIS — M79.671 BILATERAL FOOT PAIN: ICD-10-CM

## 2021-10-25 LAB
ALBUMIN SERPL-MCNC: 4.3 G/DL (ref 3.5–5.2)
ALBUMIN/GLOB SERPL: 1.7 G/DL
ALP SERPL-CCNC: 46 U/L (ref 39–117)
ALT SERPL W P-5'-P-CCNC: 12 U/L (ref 1–33)
ANION GAP SERPL CALCULATED.3IONS-SCNC: 7.6 MMOL/L (ref 5–15)
AST SERPL-CCNC: 18 U/L (ref 1–32)
BILIRUB SERPL-MCNC: 0.4 MG/DL (ref 0–1.2)
BUN SERPL-MCNC: 11 MG/DL (ref 8–23)
BUN/CREAT SERPL: 20 (ref 7–25)
CALCIUM SPEC-SCNC: 10 MG/DL (ref 8.6–10.5)
CHLORIDE SERPL-SCNC: 104 MMOL/L (ref 98–107)
CHOLEST SERPL-MCNC: 169 MG/DL (ref 0–200)
CO2 SERPL-SCNC: 29.4 MMOL/L (ref 22–29)
CREAT SERPL-MCNC: 0.55 MG/DL (ref 0.57–1)
GFR SERPL CREATININE-BSD FRML MDRD: 111 ML/MIN/1.73
GLOBULIN UR ELPH-MCNC: 2.5 GM/DL
GLUCOSE SERPL-MCNC: 100 MG/DL (ref 65–99)
HDLC SERPL-MCNC: 57 MG/DL (ref 40–60)
LDLC SERPL CALC-MCNC: 94 MG/DL (ref 0–100)
LDLC/HDLC SERPL: 1.63 {RATIO}
POTASSIUM SERPL-SCNC: 4.4 MMOL/L (ref 3.5–5.2)
PROT SERPL-MCNC: 6.8 G/DL (ref 6–8.5)
SODIUM SERPL-SCNC: 141 MMOL/L (ref 136–145)
TRIGL SERPL-MCNC: 96 MG/DL (ref 0–150)
VLDLC SERPL-MCNC: 18 MG/DL (ref 5–40)

## 2021-10-25 PROCEDURE — 99214 OFFICE O/P EST MOD 30 MIN: CPT | Performed by: INTERNAL MEDICINE

## 2021-10-25 PROCEDURE — 80061 LIPID PANEL: CPT | Performed by: INTERNAL MEDICINE

## 2021-10-25 PROCEDURE — 80053 COMPREHEN METABOLIC PANEL: CPT | Performed by: INTERNAL MEDICINE

## 2021-10-25 PROCEDURE — 73030 X-RAY EXAM OF SHOULDER: CPT

## 2021-10-25 RX ORDER — TIZANIDINE 2 MG/1
2 TABLET ORAL EVERY 8 HOURS PRN
Qty: 45 TABLET | Refills: 5 | Status: SHIPPED | OUTPATIENT
Start: 2021-10-25 | End: 2022-01-17

## 2021-10-25 RX ORDER — FENOFIBRATE 160 MG/1
160 TABLET ORAL DAILY
Qty: 90 TABLET | Refills: 1 | Status: SHIPPED | OUTPATIENT
Start: 2021-10-25 | End: 2022-04-27 | Stop reason: SDUPTHER

## 2021-10-25 RX ORDER — ALBUTEROL SULFATE 90 UG/1
2 AEROSOL, METERED RESPIRATORY (INHALATION) EVERY 4 HOURS PRN
Qty: 18 G | Refills: 11 | Status: SHIPPED | OUTPATIENT
Start: 2021-10-25 | End: 2022-10-26 | Stop reason: SDUPTHER

## 2021-10-25 NOTE — PROGRESS NOTES
Chief Complaint  Hyperlipidemia, Peripheral Neuropathy (wants to wean off the gabapentin just to see if she can do without it), Asthma, Med Refill, Muscle Pain (wants to know if there is something else she can try other than the robaxin), and arthritis test (she would like a lab to check for arthritis)    Subjective          Jyoti Puga presents to [facility]  History of Present Illness    Hyperlipidemia - she is on fenofibrate and triglycerides were 117 when we checked 6 months ago. She is fasting today. She has eaten a little more junk food recently and her weight is up some.     Peripheral neuropathy - she is on gabapentin 600 bid and she has felt that it has helped, but wondering now if she really nee and has helped but she is wondering if she really needs it. She did have NCV done in  11/18 that showed mild axonal Peripheral neuropathy and prolonged H reflex on left suggestive for a S1 radiculopathy versus sciatic nerve neuropathy.   She does have  low back pain, and at times has had sciatica pain usually down the left leg.  We had ordered physical therapy when she was here 6 months ago and she did feel like this helped some  She does get a lot of leg cramps, left side more than right. Maybe at least once a week.  She has been using the Robaxin but does not feel like it helps and would like to try a different muscle relaxer     Asthma - she is using albuterol prn and breo daily which does seem to help.  She feels like her breathing has been doing well recently     Nephrolithiasis-She sees Dr. Florez in [place]. He had her on HCTZ for awhile but is off now    B foot pain- she had seen podiatry in the past and told she had osteoarthritis.  She has also seen BGO gave her injection in back on left as well as the foot, but were very painful and doesn't want to do again.   She wants to be tested for RA because of the foot pain and also because she has left shoulder pain for  "several months.  No particular injury but does feel like shoulder will catch and can hurt at night.  Has not had hand pain  She use ibuprofen as needed a few times a week          Current Outpatient Medications:   •  albuterol sulfate  (90 Base) MCG/ACT inhaler, Inhale 2 puffs Every 4 (Four) Hours As Needed for Wheezing., Disp: 18 g, Rfl: 11  •  Breo Ellipta 100-25 MCG/INH inhaler, TAKE 1 PUFF BY MOUTH EVERY DAY, Disp: 60 each, Rfl: 11  •  Cholecalciferol (vitamin D3) 125 MCG (5000 UT) capsule capsule, Take 5,000 Units by mouth Every 7 (Seven) Days., Disp: , Rfl:   •  diclofenac (VOLTAREN) 1 % gel gel, Apply 4 g topically to the appropriate area as directed 3 (Three) Times a Day. Apply to foot, Disp: 100 g, Rfl: 11  •  fenofibrate 160 MG tablet, Take 1 tablet by mouth Daily., Disp: 90 tablet, Rfl: 1  •  gabapentin (NEURONTIN) 600 MG tablet, TAKE 1 TABLET BY MOUTH TWICE A DAY, Disp: 60 tablet, Rfl: 0  •  RABEprazole (ACIPHEX) 20 MG EC tablet, TAKE ONE TABLET BY MOUTH DAILY, Disp: 90 tablet, Rfl: 3  •  tiZANidine (ZANAFLEX) 2 MG tablet, Take 1 tablet by mouth Every 8 (Eight) Hours As Needed for Muscle Spasms., Disp: 45 tablet, Rfl: 5      Objective   Vital Signs:   /84 (BP Location: Right arm, Patient Position: Sitting)   Pulse 70   Temp 97.7 °F (36.5 °C) (Infrared)   Ht 166.9 cm (65.7\")   Wt 111 kg (244 lb 9.6 oz)   SpO2 96%   BMI 39.84 kg/m²       Physical exam  Constitutional: oriented to person, place, and time.  well-developed and well-nourished. No distress.   HENT:   Head: Normocephalic and atraumatic.   Eyes: Conjunctivae and EOM are normal.   Cardiovascular: Normal rate, regular rhythm and normal heart sounds.  Exam reveals no gallop and no friction rub.    No murmur heard.  Pulmonary/Chest: Effort normal and breath sounds normal. No respiratory distress.   no wheezes.   Neurological:  alert and oriented to person, place, and time.   Skin: Skin is warm and dry. not diaphoretic.   MS- left " shoulder -does catch with passive rotation.  No tenderness.  Hands and wrists -no synovitis or tenderness  Psychiatric:  normal mood and affect. behavior is normal. Judgment and thought content normal.      Result Review :   The following data was reviewed by: Waleska Miller MD on 10/25/2021:  CMP    CMP 4/22/21   Glucose 84   BUN 18   Creatinine 0.65   eGFR Non African Am 92   Sodium 137   Potassium 4.0   Chloride 98   Calcium 9.5   Albumin 4.10   Total Bilirubin 0.6   Alkaline Phosphatase 47   AST (SGOT) 25   ALT (SGPT) 16           CBC    CBC 4/22/21   WBC 5.31   RBC 4.67   Hemoglobin 14.0   Hematocrit 41.2   MCV 88.2   MCH 30.0   MCHC 34.0   RDW 12.6   Platelets 252           Lipid Panel    Lipid Panel 4/22/21   Total Cholesterol 181   Triglycerides 117   HDL Cholesterol 53   VLDL Cholesterol 21   LDL Cholesterol  107 (A)   LDL/HDL Ratio 1.97   (A) Abnormal value            TSH    TSH 4/22/21   TSH 1.730                           Assessment and Plan    Diagnoses and all orders for this visit:    1. Idiopathic peripheral neuropathy (Primary) as well as left S1 radiculopathy-she has done well on gabapentin but wants to try to lower dose.  She just filled the 600 mg so we will have her cut in half and do 300 twice a day and see how she does.  We will schedule a follow-up next month and if doing well, we will wean further.    2. SOB (shortness of breath)-doing well with Breo and as needed albuterol  -     albuterol sulfate  (90 Base) MCG/ACT inhaler; Inhale 2 puffs Every 4 (Four) Hours As Needed for Wheezing.  Dispense: 18 g; Refill: 11    3. Pure hypercholesterolemia  Comments:  much better w/ fenofibrate.  Recheck today  Orders:  -     fenofibrate 160 MG tablet; Take 1 tablet by mouth Daily.  Dispense: 90 tablet; Refill: 1  -     Comprehensive Metabolic Panel; Future  -     Lipid Panel; Future    4. Acute pain of left shoulder-we will check an x-ray.  Pending on x-ray, we will have her see Ortho or try  physical therapy with Julio again.  Discussed that her symptoms are unlikely rheumatoid arthritis  tested for rheumatoid  -     XR Shoulder 2+ View Left; Future    5.  Leg cramps-we will try different muscle relaxer.  Discussed with patient that the gabapentin helps sometimes with leg cramps too so we will need to see if the cramps worsen off of it  -     tiZANidine (ZANAFLEX) 2 MG tablet; Take 1 tablet by mouth Every 8 (Eight) Hours As Needed for Muscle Spasms.  Dispense: 45 tablet; Refill: 5    6.  Bilateral foot pain-has been better with the gabapentin.  Discussed with her she wanted to see podiatry again but she declines      Follow Up   Return in about 4 weeks (around 11/22/2021).  Patient was given instructions and counseling regarding her condition or for health maintenance advice. Please see specific information pulled into the AVS if appropriate.

## 2021-10-29 ENCOUNTER — PRIOR AUTHORIZATION (OUTPATIENT)
Dept: INTERNAL MEDICINE | Facility: CLINIC | Age: 66
End: 2021-10-29

## 2021-10-29 DIAGNOSIS — M25.512 ACUTE PAIN OF LEFT SHOULDER: Primary | ICD-10-CM

## 2021-10-29 NOTE — TELEPHONE ENCOUNTER
Received a PA on albuterol inhaler.  I called the pharmacy and spoke to them about a change in brand that might be covered.  The pharmacy changed to a different brand that was covered by her insurance and it went through with no problem.

## 2021-11-01 ENCOUNTER — TELEPHONE (OUTPATIENT)
Dept: INTERNAL MEDICINE | Facility: CLINIC | Age: 66
End: 2021-11-01

## 2021-11-01 NOTE — TELEPHONE ENCOUNTER
----- Message from Waleska Miller MD sent at 10/29/2021  7:24 AM EDT -----  Can you let her know, there is some arthritis on her shoulder xray. We can try some PT and see how she does. I will put in the order for KORT

## 2021-11-24 ENCOUNTER — OFFICE VISIT (OUTPATIENT)
Dept: INTERNAL MEDICINE | Facility: CLINIC | Age: 66
End: 2021-11-24

## 2021-11-24 VITALS
SYSTOLIC BLOOD PRESSURE: 122 MMHG | DIASTOLIC BLOOD PRESSURE: 84 MMHG | OXYGEN SATURATION: 100 % | BODY MASS INDEX: 39.21 KG/M2 | HEART RATE: 72 BPM | TEMPERATURE: 96.9 F | HEIGHT: 66 IN | WEIGHT: 244 LBS

## 2021-11-24 DIAGNOSIS — G62.9 NEUROPATHY: Primary | ICD-10-CM

## 2021-11-24 DIAGNOSIS — M25.512 CHRONIC LEFT SHOULDER PAIN: ICD-10-CM

## 2021-11-24 DIAGNOSIS — G89.29 CHRONIC LEFT SHOULDER PAIN: ICD-10-CM

## 2021-11-24 PROCEDURE — 99214 OFFICE O/P EST MOD 30 MIN: CPT | Performed by: INTERNAL MEDICINE

## 2021-11-24 RX ORDER — GABAPENTIN 600 MG/1
TABLET ORAL
Qty: 180 TABLET | Refills: 1 | Status: SHIPPED | OUTPATIENT
Start: 2021-11-24 | End: 2022-05-11 | Stop reason: SDUPTHER

## 2021-11-24 NOTE — PROGRESS NOTES
Chief Complaint  Peripheral Neuropathy (tried to wean off gabapentin but she was in pain went back on medication, she went off for a week)    Subjective          Jyoti Puga presents to [facility]  History of Present Illness    Neuropathy and S1 radiculopathy on the left-patient has been on gabapentin but at last visit last month she wanted to try to get off of it.  She has the 600 mg tablets, so at last visit we had her do half tablet twice a day.she did feel like pain was worse doing the lower dose, so she ended up going back to 600mg twice a day and doing better.  She also will use the tizanidine as needed if the left foot feels like it cramps.  This seems to help also    Left shoulder pain-we did x-ray last month which showed some arthritis.  We did put in referral for PT but she says she never heard anything but she has been using Voltaren gel and doing some range of motion exercises and feels like it is doing better    Current Outpatient Medications:   •  albuterol sulfate  (90 Base) MCG/ACT inhaler, Inhale 2 puffs Every 4 (Four) Hours As Needed for Wheezing., Disp: 18 g, Rfl: 11  •  Breo Ellipta 100-25 MCG/INH inhaler, TAKE 1 PUFF BY MOUTH EVERY DAY, Disp: 60 each, Rfl: 11  •  Cholecalciferol (vitamin D3) 125 MCG (5000 UT) capsule capsule, Take 5,000 Units by mouth Every 7 (Seven) Days., Disp: , Rfl:   •  diclofenac (VOLTAREN) 1 % gel gel, Apply 4 g topically to the appropriate area as directed 3 (Three) Times a Day. Apply to foot, Disp: 100 g, Rfl: 11  •  fenofibrate 160 MG tablet, Take 1 tablet by mouth Daily., Disp: 90 tablet, Rfl: 1  •  gabapentin (NEURONTIN) 600 MG tablet, TAKE 1 TABLET BY MOUTH TWICE A DAY, Disp: 180 tablet, Rfl: 1  •  RABEprazole (ACIPHEX) 20 MG EC tablet, TAKE ONE TABLET BY MOUTH DAILY, Disp: 90 tablet, Rfl: 3  •  tiZANidine (ZANAFLEX) 2 MG tablet, Take 1 tablet by mouth Every 8 (Eight) Hours As Needed for Muscle Spasms., Disp: 45 tablet,  "Rfl: 5      Objective   Vital Signs:   /84 (BP Location: Right arm, Patient Position: Sitting)   Pulse 72   Temp 96.9 °F (36.1 °C) (Infrared)   Ht 166.9 cm (65.7\")   Wt 111 kg (244 lb)   SpO2 100%   BMI 39.74 kg/m²       Physical exam  Constitutional: oriented to person, place, and time.  well-developed and well-nourished. No distress.   HENT:   Head: Normocephalic and atraumatic.   Eyes: Conjunctivae and EOM are normal.   Cardiovascular: Normal rate, regular rhythm and normal heart sounds.  Exam reveals no gallop and no friction rub.    No murmur heard.  Pulmonary/Chest: Effort normal and breath sounds normal. No respiratory distress.   no wheezes.   Neurological:  alert and oriented to person, place, and time.   Skin: Skin is warm and dry. not diaphoretic.  MS: Slightly decreased active range of motion of left shoulder  Psychiatric:  normal mood and affect. behavior is normal. Judgment and thought content normal.      Result Review :                   Assessment and Plan    Diagnoses and all orders for this visit:    1. Neuropathy (Primary)-stable on gabapentin.  We will continue.  She has signed controlled substance contract.  Óscar appropriate.  -     gabapentin (NEURONTIN) 600 MG tablet; TAKE 1 TABLET BY MOUTH TWICE A DAY  Dispense: 180 tablet; Refill: 1    2. Chronic left shoulder pain-she declines physical therapy but will continue doing exercises and I gave her another sheet with some additional exercises.  Call if any worsening        Follow Up   No follow-ups on file.  Patient was given instructions and counseling regarding her condition or for health maintenance advice. Please see specific information pulled into the AVS if appropriate.       "

## 2022-01-17 RX ORDER — TIZANIDINE 2 MG/1
TABLET ORAL
Qty: 270 TABLET | Refills: 1 | Status: SHIPPED | OUTPATIENT
Start: 2022-01-17 | End: 2022-04-25

## 2022-02-02 DIAGNOSIS — Z12.11 COLON CANCER SCREENING: Primary | ICD-10-CM

## 2022-04-23 NOTE — PROGRESS NOTES
Here for physical    Exercise: doing a peddler some, but otherwise none  Diet:healthy overall, on D and MVI, not on any Fe currently. Ate just a bite of green beans today  etoh - rare     Hyperlipidemia - she is on fenofibrate.     Neuropathy and S1 radiculopathy on the left-on gabapentin 600 mg twice a day.  She also will use the tizanidine as needed if the left foot feels like it cramps.  This seems to help also.    B foot pain - she had seen podiatry in the past and told she had osteoarthritis.  She has also seen BGO gave her injection in left foot, but were very painful and doesn't want to do again. She is taking 2 ibuprofen daily for her foot pain - helps a little. Left foot worse than right.  She does remember being on Celebrex years ago and helping     fe def anemia - had endoscopy done in 2/20 which showed antral ulcers.  She uses the  iron intermittently but has been off for some time.  No stomach pain.  No melena     Asthma - she is using albuterol prn and breo daily which does seem to help      Nephrolithiasis-She sees  urologist in [place] - no problems recentl      Current Outpatient Medications:   •  albuterol sulfate  (90 Base) MCG/ACT inhaler, Inhale 2 puffs Every 4 (Four) Hours As Needed for Wheezing., Disp: 18 g, Rfl: 11  •  Breo Ellipta 100-25 MCG/INH inhaler, TAKE 1 PUFF BY MOUTH EVERY DAY, Disp: 60 each, Rfl: 11  •  Cholecalciferol (vitamin D3) 125 MCG (5000 UT) capsule capsule, Take 5,000 Units by mouth Every 7 (Seven) Days., Disp: , Rfl:   •  diclofenac (VOLTAREN) 1 % gel gel, Apply 4 g topically to the appropriate area as directed 3 (Three) Times a Day. Apply to foot, Disp: 100 g, Rfl: 11  •  fenofibrate 160 MG tablet, Take 1 tablet by mouth Daily., Disp: 90 tablet, Rfl: 1  •  gabapentin (NEURONTIN) 600 MG tablet, TAKE 1 TABLET BY MOUTH TWICE A DAY, Disp: 180 tablet, Rfl: 1  •  RABEprazole (ACIPHEX) 20 MG EC tablet, TAKE ONE TABLET BY MOUTH DAILY, Disp: 90 tablet, Rfl: 3    The  "following portions of the patient's history were reviewed and updated as appropriate: allergies, current medications, past family history, past medical history, past social history, past surgical history and problem list.    Review of Systems   Constitutional: Positive for unexpected weight loss (wt down a few pounds). Negative for activity change, appetite change, fever and unexpected weight gain.   HENT: Positive for hearing loss (r ear- has noticed decreased hearing. had surgery on ear in past).    Eyes: Negative.    Respiratory: Negative for shortness of breath and wheezing.    Cardiovascular: Negative for chest pain, palpitations and leg swelling.   Gastrointestinal: Negative.  Constipation: occasionally.   Endocrine: Negative.    Genitourinary: Negative for difficulty urinating and dysuria.   Musculoskeletal: Positive for arthralgias.   Skin: Negative.    Allergic/Immunologic: Negative for immunocompromised state.   Neurological: Negative for seizures, speech difficulty, memory problem and confusion.   Hematological: Does not bruise/bleed easily.   Psychiatric/Behavioral: Negative for agitation.         Objective    /74 (BP Location: Right arm, Patient Position: Sitting)   Pulse 77   Temp 97.3 °F (36.3 °C) (Infrared)   Ht 166.9 cm (65.7\")   Wt 108 kg (238 lb 12.8 oz)   SpO2 95%   BMI 38.90 kg/m²   Physical Exam   Physical Exam  Vitals and nursing note reviewed.   Constitutional:       General: She is not in acute distress.     Appearance: She is well-developed. She is not diaphoretic.   HENT:      Head: Normocephalic and atraumatic.      Right Ear: External ear normal.      Left Ear: External ear normal.      Ears:      Comments: r ear w/ cerumen impaction     Nose: Nose normal.      Mouth/Throat:      Pharynx: No oropharyngeal exudate.   Eyes:      General: No scleral icterus.        Right eye: No discharge.         Left eye: No discharge.      Conjunctiva/sclera: Conjunctivae normal.      Pupils: " Pupils are equal, round, and reactive to light.   Neck:      Thyroid: No thyromegaly.   Cardiovascular:      Rate and Rhythm: Normal rate and regular rhythm.      Heart sounds: Normal heart sounds. No murmur heard.    No friction rub. No gallop.   Pulmonary:      Effort: Pulmonary effort is normal. No respiratory distress.      Breath sounds: Normal breath sounds. No wheezing or rales.   Chest:   Breasts:      Right: No mass, nipple discharge, skin change or tenderness.      Left: No mass, nipple discharge, skin change or tenderness.       Abdominal:      General: Bowel sounds are normal. There is no distension.      Palpations: Abdomen is soft. There is no mass.      Tenderness: There is no abdominal tenderness. There is no guarding or rebound.   Musculoskeletal:         General: Tenderness present. No swelling (B feet on dorsum) or deformity. Normal range of motion.      Cervical back: Normal range of motion and neck supple.      Right lower leg: No edema.      Left lower leg: No edema.   Lymphadenopathy:      Cervical: No cervical adenopathy.   Skin:     General: Skin is warm and dry.      Coloration: Skin is not pale.      Findings: No erythema or rash.   Neurological:      General: No focal deficit present.      Mental Status: She is alert and oriented to person, place, and time. Mental status is at baseline.      Coordination: Coordination normal.      Deep Tendon Reflexes: Reflexes normal.   Psychiatric:         Behavior: Behavior normal.         Thought Content: Thought content normal.         Judgment: Judgment normal.       Ear Cerumen Removal    Date/Time: 4/25/2022 2:24 PM  Performed by: Waleska Miller MD  Authorized by: Waleska Miller MD     Anesthesia:  Local Anesthetic: none  Location details: right ear  Procedure type: irrigation   Sedation:  Patient sedated: no            Assessment/Plan   Diagnoses and all orders for this visit:    1. Routine general medical examination at [facility]  facility (Primary)  Regular exercise/healthy diet. BSE q month. Sunscreen use encouraged.  Check fasting labs  Jong due 8/22  Colon ca screening - she did cologuard in '22- will repeat in '25  DT due 5/28  DEXA due 7/23  Shingles - shingrix discussed -  can check at pharmacy since we do not have   She had pneumovax (smoker) in '17, will give prevnar 20 age 67  CT lung cancer screen due now-we will schedule  She does not need paps as she had hysterectomy for benign reasons  covid-she had vaccines  -     CBC (No Diff); Future  -     TSH Rfx On Abnormal To Free T4; Future  -     Lipid Panel; Future  -     Comprehensive Metabolic Panel; Future    2. Idiopathic peripheral neuropathy- she is on gabapentin. Has signed SHASHA, kimberli appropriate    3. Vitamin D deficiency- continue vit D    4. Pure hypercholesterolemia- check levels today    5. Iron deficiency- h/o antral ulcer. We will recheck today  -     Iron Profile; Future  -     Ferritin; Future    6. Right ear impacted cerumen- ear irrigation attempted and some cerumen removed, but not all. She will try debrox  -     Cerumen Removal    7. B foot pain- probable OA. we will send in celebrex to use once a day w/ food. If she has increase in stomach pain, she will stop and let me know. She is on PPI

## 2022-04-25 ENCOUNTER — LAB (OUTPATIENT)
Dept: LAB | Facility: HOSPITAL | Age: 67
End: 2022-04-25

## 2022-04-25 ENCOUNTER — OFFICE VISIT (OUTPATIENT)
Dept: INTERNAL MEDICINE | Facility: CLINIC | Age: 67
End: 2022-04-25

## 2022-04-25 VITALS
SYSTOLIC BLOOD PRESSURE: 118 MMHG | TEMPERATURE: 97.3 F | DIASTOLIC BLOOD PRESSURE: 74 MMHG | WEIGHT: 238.8 LBS | HEART RATE: 77 BPM | OXYGEN SATURATION: 95 % | BODY MASS INDEX: 38.38 KG/M2 | HEIGHT: 66 IN

## 2022-04-25 DIAGNOSIS — R74.8 ELEVATED LIVER ENZYMES: ICD-10-CM

## 2022-04-25 DIAGNOSIS — H61.21 RIGHT EAR IMPACTED CERUMEN: ICD-10-CM

## 2022-04-25 DIAGNOSIS — E78.00 PURE HYPERCHOLESTEROLEMIA: ICD-10-CM

## 2022-04-25 DIAGNOSIS — M79.671 BILATERAL FOOT PAIN: ICD-10-CM

## 2022-04-25 DIAGNOSIS — Z87.891 PERSONAL HISTORY OF TOBACCO USE, PRESENTING HAZARDS TO HEALTH: ICD-10-CM

## 2022-04-25 DIAGNOSIS — G60.9 IDIOPATHIC PERIPHERAL NEUROPATHY: ICD-10-CM

## 2022-04-25 DIAGNOSIS — E61.1 IRON DEFICIENCY: ICD-10-CM

## 2022-04-25 DIAGNOSIS — E55.9 VITAMIN D DEFICIENCY: ICD-10-CM

## 2022-04-25 DIAGNOSIS — Z00.00 ROUTINE GENERAL MEDICAL EXAMINATION AT A HEALTH CARE FACILITY: Primary | ICD-10-CM

## 2022-04-25 DIAGNOSIS — R06.02 SOB (SHORTNESS OF BREATH): ICD-10-CM

## 2022-04-25 DIAGNOSIS — Z00.00 ROUTINE GENERAL MEDICAL EXAMINATION AT A HEALTH CARE FACILITY: ICD-10-CM

## 2022-04-25 DIAGNOSIS — N20.0 NEPHROLITHIASIS: ICD-10-CM

## 2022-04-25 DIAGNOSIS — M79.672 BILATERAL FOOT PAIN: ICD-10-CM

## 2022-04-25 PROCEDURE — 69209 REMOVE IMPACTED EAR WAX UNI: CPT | Performed by: INTERNAL MEDICINE

## 2022-04-25 PROCEDURE — 83540 ASSAY OF IRON: CPT | Performed by: INTERNAL MEDICINE

## 2022-04-25 PROCEDURE — 80061 LIPID PANEL: CPT | Performed by: INTERNAL MEDICINE

## 2022-04-25 PROCEDURE — 80050 GENERAL HEALTH PANEL: CPT | Performed by: INTERNAL MEDICINE

## 2022-04-25 PROCEDURE — 99396 PREV VISIT EST AGE 40-64: CPT | Performed by: INTERNAL MEDICINE

## 2022-04-25 PROCEDURE — 84466 ASSAY OF TRANSFERRIN: CPT | Performed by: INTERNAL MEDICINE

## 2022-04-25 PROCEDURE — 82728 ASSAY OF FERRITIN: CPT | Performed by: INTERNAL MEDICINE

## 2022-04-25 RX ORDER — FENOFIBRATE 160 MG/1
160 TABLET ORAL DAILY
Qty: 90 TABLET | Refills: 1 | Status: CANCELLED | OUTPATIENT
Start: 2022-04-25

## 2022-04-25 RX ORDER — RABEPRAZOLE SODIUM 20 MG/1
20 TABLET, DELAYED RELEASE ORAL DAILY
Qty: 90 TABLET | Refills: 3 | Status: CANCELLED | OUTPATIENT
Start: 2022-04-25

## 2022-04-25 RX ORDER — CELECOXIB 200 MG/1
CAPSULE ORAL
Qty: 30 CAPSULE | Refills: 5 | Status: SHIPPED | OUTPATIENT
Start: 2022-04-25 | End: 2022-04-27 | Stop reason: SDUPTHER

## 2022-04-25 RX ORDER — GABAPENTIN 600 MG/1
TABLET ORAL
Qty: 180 TABLET | Refills: 1 | Status: CANCELLED | OUTPATIENT
Start: 2022-04-25

## 2022-04-26 ENCOUNTER — PRIOR AUTHORIZATION (OUTPATIENT)
Dept: INTERNAL MEDICINE | Facility: CLINIC | Age: 67
End: 2022-04-26

## 2022-04-26 DIAGNOSIS — M79.672 BILATERAL FOOT PAIN: ICD-10-CM

## 2022-04-26 DIAGNOSIS — R06.02 SOB (SHORTNESS OF BREATH): ICD-10-CM

## 2022-04-26 DIAGNOSIS — M79.671 BILATERAL FOOT PAIN: ICD-10-CM

## 2022-04-26 DIAGNOSIS — E78.00 PURE HYPERCHOLESTEROLEMIA: ICD-10-CM

## 2022-04-26 LAB
ALBUMIN SERPL-MCNC: 4.7 G/DL (ref 3.5–5.2)
ALBUMIN/GLOB SERPL: 2.1 G/DL
ALP SERPL-CCNC: 38 U/L (ref 39–117)
ALT SERPL W P-5'-P-CCNC: 30 U/L (ref 1–33)
ANION GAP SERPL CALCULATED.3IONS-SCNC: 9.9 MMOL/L (ref 5–15)
AST SERPL-CCNC: 40 U/L (ref 1–32)
BILIRUB SERPL-MCNC: 0.6 MG/DL (ref 0–1.2)
BUN SERPL-MCNC: 15 MG/DL (ref 8–23)
BUN/CREAT SERPL: 19.2 (ref 7–25)
CALCIUM SPEC-SCNC: 9.8 MG/DL (ref 8.6–10.5)
CHLORIDE SERPL-SCNC: 103 MMOL/L (ref 98–107)
CHOLEST SERPL-MCNC: 151 MG/DL (ref 0–200)
CO2 SERPL-SCNC: 28.1 MMOL/L (ref 22–29)
CREAT SERPL-MCNC: 0.78 MG/DL (ref 0.57–1)
DEPRECATED RDW RBC AUTO: 47 FL (ref 37–54)
EGFRCR SERPLBLD CKD-EPI 2021: 84.9 ML/MIN/1.73
ERYTHROCYTE [DISTWIDTH] IN BLOOD BY AUTOMATED COUNT: 14.8 % (ref 12.3–15.4)
FERRITIN SERPL-MCNC: 23.1 NG/ML (ref 13–150)
GLOBULIN UR ELPH-MCNC: 2.2 GM/DL
GLUCOSE SERPL-MCNC: 79 MG/DL (ref 65–99)
HCT VFR BLD AUTO: 43.3 % (ref 34–46.6)
HDLC SERPL-MCNC: 53 MG/DL (ref 40–60)
HGB BLD-MCNC: 14 G/DL (ref 12–15.9)
IRON 24H UR-MRATE: 83 MCG/DL (ref 37–145)
IRON SATN MFR SERPL: 16 % (ref 20–50)
LDLC SERPL CALC-MCNC: 78 MG/DL (ref 0–100)
LDLC/HDLC SERPL: 1.43 {RATIO}
MCH RBC QN AUTO: 28.3 PG (ref 26.6–33)
MCHC RBC AUTO-ENTMCNC: 32.3 G/DL (ref 31.5–35.7)
MCV RBC AUTO: 87.7 FL (ref 79–97)
PLATELET # BLD AUTO: 237 10*3/MM3 (ref 140–450)
PMV BLD AUTO: 11.7 FL (ref 6–12)
POTASSIUM SERPL-SCNC: 3.9 MMOL/L (ref 3.5–5.2)
PROT SERPL-MCNC: 6.9 G/DL (ref 6–8.5)
RBC # BLD AUTO: 4.94 10*6/MM3 (ref 3.77–5.28)
SODIUM SERPL-SCNC: 141 MMOL/L (ref 136–145)
TIBC SERPL-MCNC: 527 MCG/DL (ref 298–536)
TRANSFERRIN SERPL-MCNC: 354 MG/DL (ref 200–360)
TRIGL SERPL-MCNC: 110 MG/DL (ref 0–150)
TSH SERPL DL<=0.05 MIU/L-ACNC: 2.62 UIU/ML (ref 0.27–4.2)
VLDLC SERPL-MCNC: 20 MG/DL (ref 5–40)
WBC NRBC COR # BLD: 5.9 10*3/MM3 (ref 3.4–10.8)

## 2022-04-26 RX ORDER — FENOFIBRATE 160 MG/1
TABLET ORAL
Qty: 90 TABLET | Refills: 1 | OUTPATIENT
Start: 2022-04-26

## 2022-04-26 RX ORDER — CELECOXIB 200 MG/1
CAPSULE ORAL
Qty: 30 CAPSULE | Refills: 5 | OUTPATIENT
Start: 2022-04-26

## 2022-04-26 NOTE — TELEPHONE ENCOUNTER
Caller: Jyoti Puga    Relationship: Self    Best call back number:[phone]      Requested Prescriptions:   Requested Prescriptions     Pending Prescriptions Disp Refills   • fenofibrate 160 MG tablet [Pharmacy Med Name: FENOFIBRATE 160 MG TABLET] 90 tablet 1     Sig: TAKE 1 TABLET BY MOUTH EVERY DAY   • celecoxib (CeleBREX) 200 MG capsule 30 capsule 5     Si qd w/ food as needed for pain. Stop ibuprofen        Pharmacy where request should be sent: [address] [phone] [facility] [phone] [facility]     Additional details provided by patient:   Does the patient have less than a 3 day supply:  [x] Yes  [] No    Evelyne Wellington Rep   22 10:03 EDT

## 2022-04-27 ENCOUNTER — TELEPHONE (OUTPATIENT)
Dept: INTERNAL MEDICINE | Facility: CLINIC | Age: 67
End: 2022-04-27

## 2022-04-27 RX ORDER — FENOFIBRATE 160 MG/1
160 TABLET ORAL DAILY
Qty: 90 TABLET | Refills: 1 | Status: SHIPPED | OUTPATIENT
Start: 2022-04-27 | End: 2022-10-26 | Stop reason: SDUPTHER

## 2022-04-27 RX ORDER — CELECOXIB 200 MG/1
CAPSULE ORAL
Qty: 30 CAPSULE | Refills: 5 | Status: SHIPPED | OUTPATIENT
Start: 2022-04-27 | End: 2022-10-26 | Stop reason: SDUPTHER

## 2022-04-27 RX ORDER — RABEPRAZOLE SODIUM 20 MG/1
20 TABLET, DELAYED RELEASE ORAL DAILY
Qty: 90 TABLET | Refills: 3 | Status: SHIPPED | OUTPATIENT
Start: 2022-04-27 | End: 2022-12-05

## 2022-04-27 NOTE — TELEPHONE ENCOUNTER
Patient has been notified all of her scripts have been cancelled at [facility].  She still may get automated calls from [facility] because the system had not be updated yet per [facility].  All of her scripts have been sent to [facility] for her refills.

## 2022-04-27 NOTE — TELEPHONE ENCOUNTER
Caller: Jyoti Puga    Relationship: Self    Best call back number: [phone]    What is the best time to reach you: ANYTIME     Who are you requesting to speak with (clinical staff, provider,  specific staff member): CLINICAL STAFF     What was the call regarding: PATIENT OS WANTING A CALL BACK FROM SHER IN REGARDS TO HER MEDICATION BEING CHANGED TO [facility]     Do you require a callback: YES

## 2022-04-27 NOTE — TELEPHONE ENCOUNTER
PATIENT CALLED BACK TO INFORM SHER THAT [facility] STATED HER MEDICATIONS STILL SHOW ACTIVE IN THERE SYSTEM.     [facility] CALLED PATIENT TO INFORM HER THAT HER BREO INHALER WAS READY, SHE WILL CALL THEM TO DECLINE THE REFILLS AS SHE NEEDS EVERYTHING TO NOW GO TO [facility] IN [facility].    PLEASE ADVISE [phone]

## 2022-04-27 NOTE — TELEPHONE ENCOUNTER
----- Message from Jyoti Puga sent at 4/27/2022 10:59 AM EDT -----  Regarding: MEDICATION DECLINED BY INSURANCE  It shows both [facility] and [facility] tried to contact insurance about meds has that been cleared up?

## 2022-04-27 NOTE — TELEPHONE ENCOUNTER
I spoke to [facility] and [facility] all of her remaining refills per patient.  She would like her refills to know go to [facility].

## 2022-04-27 NOTE — TELEPHONE ENCOUNTER
PA on celebrex was approved.  PA [id number] until 4/26/23.    Patient is not using [facility] and wants all of her scripts to go there.  I have sent in the refills that were at other pharmacies to [facility].

## 2022-04-28 DIAGNOSIS — R06.02 SOB (SHORTNESS OF BREATH): ICD-10-CM

## 2022-05-11 ENCOUNTER — TELEPHONE (OUTPATIENT)
Dept: INTERNAL MEDICINE | Facility: CLINIC | Age: 67
End: 2022-05-11

## 2022-05-11 DIAGNOSIS — G62.9 NEUROPATHY: ICD-10-CM

## 2022-05-11 RX ORDER — GABAPENTIN 600 MG/1
TABLET ORAL
Qty: 180 TABLET | Refills: 1 | Status: SHIPPED | OUTPATIENT
Start: 2022-05-11 | End: 2022-10-26 | Stop reason: SDUPTHER

## 2022-05-11 NOTE — TELEPHONE ENCOUNTER
----- Message from Jyoti Puga sent at 5/11/2022 12:10 PM EDT -----  Regarding: MEDICATION DECLINED BY INSURANCE  Noel Morris,  I will need my Gabapetin prescription called into [facility] in [facility]    Thank you

## 2022-05-17 ENCOUNTER — HOSPITAL ENCOUNTER (OUTPATIENT)
Dept: CT IMAGING | Facility: HOSPITAL | Age: 67
Discharge: HOME | End: 2022-05-17
Admitting: INTERNAL MEDICINE

## 2022-05-17 DIAGNOSIS — Z87.891 PERSONAL HISTORY OF TOBACCO USE, PRESENTING HAZARDS TO HEALTH: ICD-10-CM

## 2022-05-17 PROCEDURE — 71271 CT THORAX LUNG CANCER SCR C-: CPT

## 2022-05-18 ENCOUNTER — TELEPHONE (OUTPATIENT)
Dept: INTERNAL MEDICINE | Facility: CLINIC | Age: 67
End: 2022-05-18

## 2022-05-18 NOTE — TELEPHONE ENCOUNTER
----- Message from Waleska Miller MD sent at 5/17/2022  4:24 PM EDT -----  Can you let her know,  no signs of lung cancer seen on her CT scan, and we can repeat in 1 year. The large hiatal hernia was seen again, and some other minor things we can discuss at next office visit

## 2022-08-24 ENCOUNTER — OFFICE VISIT (OUTPATIENT)
Dept: INTERNAL MEDICINE | Facility: CLINIC | Age: 67
End: 2022-08-24

## 2022-08-24 VITALS
HEIGHT: 66 IN | TEMPERATURE: 97.6 F | WEIGHT: 226.4 LBS | DIASTOLIC BLOOD PRESSURE: 64 MMHG | RESPIRATION RATE: 18 BRPM | BODY MASS INDEX: 36.38 KG/M2 | SYSTOLIC BLOOD PRESSURE: 116 MMHG | HEART RATE: 71 BPM | OXYGEN SATURATION: 96 %

## 2022-08-24 DIAGNOSIS — R39.15 URINARY URGENCY: Primary | ICD-10-CM

## 2022-08-24 DIAGNOSIS — Z87.442 HISTORY OF KIDNEY STONES: ICD-10-CM

## 2022-08-24 LAB
BILIRUB BLD-MCNC: NEGATIVE MG/DL
CLARITY, POC: CLEAR
COLOR UR: YELLOW
EXPIRATION DATE: NORMAL
GLUCOSE UR STRIP-MCNC: NEGATIVE MG/DL
KETONES UR QL: NEGATIVE
LEUKOCYTE EST, POC: NEGATIVE
Lab: NORMAL
NITRITE UR-MCNC: NEGATIVE MG/ML
PH UR: 6 [PH] (ref 5–8)
PROT UR STRIP-MCNC: NEGATIVE MG/DL
RBC # UR STRIP: NEGATIVE /UL
SP GR UR: 1.02 (ref 1–1.03)
UROBILINOGEN UR QL: NORMAL

## 2022-08-24 PROCEDURE — 81003 URINALYSIS AUTO W/O SCOPE: CPT | Performed by: NURSE PRACTITIONER

## 2022-08-24 PROCEDURE — 99213 OFFICE O/P EST LOW 20 MIN: CPT | Performed by: NURSE PRACTITIONER

## 2022-08-24 RX ORDER — TAMSULOSIN HYDROCHLORIDE 0.4 MG/1
1 CAPSULE ORAL NIGHTLY
Qty: 30 CAPSULE | Refills: 0 | Status: SHIPPED | OUTPATIENT
Start: 2022-08-24 | End: 2022-11-08

## 2022-08-24 NOTE — PROGRESS NOTES
Jyoti Puga presents to [facility] for     Chief Complaint  Chief Complaint   Patient presents with   • Increased Urgency      Urinary urgency, low back and abdominal pain, hematuria - 2 weeks          Subjective        Urinary Frequency   This is a new problem. The current episode started 1 to 4 weeks ago. The problem has been resolved. The patient is experiencing no pain. There has been no fever. There is no history of pyelonephritis. Associated symptoms include flank pain, frequency, hematuria and urgency. Pertinent negatives include no discharge, hesitancy, nausea, possible pregnancy or vomiting. She has tried nothing for the symptoms. The treatment provided no relief. Her past medical history is significant for kidney stones.     She had an episode of urgency, flank pain, abdominal pain, and hematuria about 2 weeks ago.  This is since resolved.  She does have a history of kidney stones.  She is not aware that she has passed any stones recently.      Review of Systems   Constitutional: Negative for fatigue, fever and unexpected weight loss.   Eyes: Negative for blurred vision, double vision and visual disturbance.   Respiratory: Negative for cough, shortness of breath and wheezing.    Cardiovascular: Negative for chest pain, palpitations and leg swelling.   Gastrointestinal: Negative for abdominal pain, constipation, diarrhea, nausea and vomiting.   Genitourinary: Positive for flank pain, frequency, hematuria and urgency. Negative for difficulty urinating and hesitancy.   Musculoskeletal: Negative for arthralgias and myalgias.   Skin: Negative for color change and rash.   Neurological: Negative for dizziness, weakness and headache.   Hematological: Negative for adenopathy. Does not bruise/bleed easily.         Allergies   Allergen Reactions   • Codeine Nausea Only     Current Outpatient Medications on File Prior to Visit   Medication Sig Dispense Refill   • albuterol sulfate   (90 Base) MCG/ACT inhaler Inhale 2 puffs Every 4 (Four) Hours As Needed for Wheezing. 18 g 11   • celecoxib (CeleBREX) 200 MG capsule 1 qd w/ food as needed for pain. Stop ibuprofen 30 capsule 5   • Cholecalciferol (vitamin D3) 125 MCG (5000 UT) capsule capsule Take 5,000 Units by mouth Every 7 (Seven) Days.     • diclofenac (VOLTAREN) 1 % gel gel Apply 4 g topically to the appropriate area as directed 3 (Three) Times a Day. Apply to foot 100 g 11   • fenofibrate 160 MG tablet Take 1 tablet by mouth Daily. 90 tablet 1   • Fluticasone Furoate-Vilanterol (Breo Ellipta) 100-25 MCG/INH inhaler Inhale 1 puff Daily. 60 each 11   • gabapentin (NEURONTIN) 600 MG tablet TAKE 1 TABLET BY MOUTH TWICE A  tablet 1   • RABEprazole (ACIPHEX) 20 MG EC tablet Take 1 tablet by mouth Daily. 90 tablet 3     No current facility-administered medications on file prior to visit.         The following portions of the patient's history were reviewed and updated as appropriate: allergies, current medications, past family history, past medical history, past social history, past surgical history and problem list and are available for review within electronic record    Objective     Result Review :     The following data was reviewed by: NELLIE Conley on 08/24/2022:    Results for orders placed or performed in visit on 08/24/22   POC Urinalysis Dipstick, Automated    Specimen: Urine   Result Value Ref Range    Color Yellow Yellow, Straw, Dark Yellow, Liliya    Clarity, UA Clear Clear    Specific Gravity  1.020 1.005 - 1.030    pH, Urine 6.0 5.0 - 8.0    Leukocytes Negative Negative    Nitrite, UA Negative Negative    Protein, POC Negative Negative mg/dL    Glucose, UA Negative Negative mg/dL    Ketones, UA Negative Negative    Urobilinogen, UA Normal Normal, 0.2 E.U./dL    Bilirubin Negative Negative    Blood, UA Negative Negative    Lot Number [id number]     Expiration Date 10/23/2023                 Vital Signs:   BP  "116/64   Pulse 71   Temp 97.6 °F (36.4 °C) (Infrared)   Resp 18   Ht 166.9 cm (65.7\")   Wt 103 kg (226 lb 6.4 oz)   SpO2 96%   BMI 36.88 kg/m²         Physical Exam  Vitals and nursing note reviewed.   Constitutional:       General: She is not in acute distress.     Appearance: Normal appearance. She is well-developed. She is not diaphoretic.   HENT:      Head: Normocephalic and atraumatic.   Eyes:      Conjunctiva/sclera: Conjunctivae normal.   Neck:      Vascular: No JVD.   Cardiovascular:      Rate and Rhythm: Normal rate and regular rhythm.      Heart sounds: Normal heart sounds. No murmur heard.  Pulmonary:      Effort: Pulmonary effort is normal. No respiratory distress.      Breath sounds: Normal breath sounds.   Chest:      Chest wall: No tenderness.   Abdominal:      General: Bowel sounds are normal. There is no distension.      Palpations: Abdomen is soft. Abdomen is not rigid. There is no mass.      Tenderness: There is no abdominal tenderness. There is no right CVA tenderness, left CVA tenderness, guarding or rebound.      Hernia: No hernia is present.   Musculoskeletal:         General: Normal range of motion.      Cervical back: Normal range of motion.   Skin:     General: Skin is warm and dry.      Coloration: Skin is not pale.      Findings: No erythema.   Neurological:      Mental Status: She is alert and oriented to person, place, and time.   Psychiatric:         Behavior: Behavior normal.         Thought Content: Thought content normal.         Judgment: Judgment normal.                 Assessment and Plan      Diagnoses and all orders for this visit:    1. Urinary urgency (Primary)  -     POC Urinalysis Dipstick, Automated  -     tamsulosin (FLOMAX) 0.4 MG capsule 24 hr capsule; Take 1 capsule by mouth Every Night.  Dispense: 30 capsule; Refill: 0    2. History of kidney stones  -     tamsulosin (FLOMAX) 0.4 MG capsule 24 hr capsule; Take 1 capsule by mouth Every Night.  Dispense: 30 " capsule; Refill: 0    She is a history of kidney stones.  Her urine looks okay today.  Symptoms have resolved.  We will go ahead and send her on some tamsulosin that she can start if symptoms resume over the next couple of days.  We did discuss the possibility of checking a CT scan to evaluate for stones.  She is not interested in doing that at this time and will let me know if she changes her mind or symptoms worsen.      Follow Up     Patient was given instructions and counseling regarding her condition or for health maintenance advice. Please see specific information pulled into the AVS if appropriate.   Any new medication's adverse effects have been discussed in detail with patient  Patient was encouraged to keep me informed of any acute changes, lack of improvement, or any new concerning symptoms.    Return if symptoms worsen or fail to improve.          Dictated Utilizing Dragon Dictation   Please note that portions of this note were completed with a voice recognition program.   Part of this note may be an electronic transcription/translation of spoken language to printed text using the Dragon Dictation System.

## 2022-08-29 ENCOUNTER — PATIENT MESSAGE (OUTPATIENT)
Dept: INTERNAL MEDICINE | Facility: CLINIC | Age: 67
End: 2022-08-29

## 2022-08-29 DIAGNOSIS — Z87.442 HISTORY OF KIDNEY STONES: ICD-10-CM

## 2022-08-29 DIAGNOSIS — R10.9 FLANK PAIN: ICD-10-CM

## 2022-08-29 DIAGNOSIS — R39.15 URINARY URGENCY: Primary | ICD-10-CM

## 2022-08-29 NOTE — TELEPHONE ENCOUNTER
From: Jyoti Puga  To: Waleska Miller MD  Sent: 8/29/2022 8:13 AM EDT  Subject: KIDNEY STONES    I was in the office on the 24th Dr. Blanca Jewell checked me for Kidney infection, and I did not have one. I am now experiencing what I think is another stone moving she said to see her notes. I am requesting to have the ctc scan as she recommended. Can you please set me up with one?    Thank You  Elsa Puga

## 2022-09-09 ENCOUNTER — APPOINTMENT (OUTPATIENT)
Dept: CT IMAGING | Facility: HOSPITAL | Age: 67
End: 2022-09-09

## 2022-10-20 ENCOUNTER — TRANSCRIBE ORDERS (OUTPATIENT)
Dept: ADMINISTRATIVE | Facility: HOSPITAL | Age: 67
End: 2022-10-20

## 2022-10-20 DIAGNOSIS — Z12.31 VISIT FOR SCREENING MAMMOGRAM: Primary | ICD-10-CM

## 2022-10-26 ENCOUNTER — LAB (OUTPATIENT)
Dept: LAB | Facility: HOSPITAL | Age: 67
End: 2022-10-26

## 2022-10-26 ENCOUNTER — OFFICE VISIT (OUTPATIENT)
Dept: INTERNAL MEDICINE | Facility: CLINIC | Age: 67
End: 2022-10-26

## 2022-10-26 VITALS
HEART RATE: 60 BPM | SYSTOLIC BLOOD PRESSURE: 118 MMHG | OXYGEN SATURATION: 99 % | BODY MASS INDEX: 36.16 KG/M2 | WEIGHT: 225 LBS | HEIGHT: 66 IN | DIASTOLIC BLOOD PRESSURE: 72 MMHG | TEMPERATURE: 96.9 F

## 2022-10-26 DIAGNOSIS — E78.00 PURE HYPERCHOLESTEROLEMIA: Chronic | ICD-10-CM

## 2022-10-26 DIAGNOSIS — J45.20 ASTHMA, MILD INTERMITTENT, WELL-CONTROLLED: ICD-10-CM

## 2022-10-26 DIAGNOSIS — M79.672 BILATERAL FOOT PAIN: ICD-10-CM

## 2022-10-26 DIAGNOSIS — D50.8 OTHER IRON DEFICIENCY ANEMIA: ICD-10-CM

## 2022-10-26 DIAGNOSIS — Z79.899 HIGH RISK MEDICATIONS (NOT ANTICOAGULANTS) LONG-TERM USE: ICD-10-CM

## 2022-10-26 DIAGNOSIS — E78.00 PURE HYPERCHOLESTEROLEMIA: ICD-10-CM

## 2022-10-26 DIAGNOSIS — M79.671 BILATERAL FOOT PAIN: ICD-10-CM

## 2022-10-26 DIAGNOSIS — G62.9 NEUROPATHY: Primary | ICD-10-CM

## 2022-10-26 LAB
ALBUMIN SERPL-MCNC: 3.9 G/DL (ref 3.5–5.2)
ALBUMIN/GLOB SERPL: 1.6 G/DL
ALP SERPL-CCNC: 36 U/L (ref 39–117)
ALT SERPL W P-5'-P-CCNC: 11 U/L (ref 1–33)
ANION GAP SERPL CALCULATED.3IONS-SCNC: 7.6 MMOL/L (ref 5–15)
AST SERPL-CCNC: 23 U/L (ref 1–32)
BILIRUB SERPL-MCNC: 0.4 MG/DL (ref 0–1.2)
BUN SERPL-MCNC: 14 MG/DL (ref 8–23)
BUN/CREAT SERPL: 21.2 (ref 7–25)
CALCIUM SPEC-SCNC: 9.6 MG/DL (ref 8.6–10.5)
CHLORIDE SERPL-SCNC: 108 MMOL/L (ref 98–107)
CO2 SERPL-SCNC: 25.4 MMOL/L (ref 22–29)
CREAT SERPL-MCNC: 0.66 MG/DL (ref 0.57–1)
DEPRECATED RDW RBC AUTO: 39.2 FL (ref 37–54)
EGFRCR SERPLBLD CKD-EPI 2021: 97.5 ML/MIN/1.73
ERYTHROCYTE [DISTWIDTH] IN BLOOD BY AUTOMATED COUNT: 12.9 % (ref 12.3–15.4)
GLOBULIN UR ELPH-MCNC: 2.4 GM/DL
GLUCOSE SERPL-MCNC: 119 MG/DL (ref 65–99)
HCT VFR BLD AUTO: 29.6 % (ref 34–46.6)
HGB BLD-MCNC: 9.7 G/DL (ref 12–15.9)
IRON 24H UR-MRATE: 28 MCG/DL (ref 37–145)
IRON SATN MFR SERPL: 5 % (ref 20–50)
MCH RBC QN AUTO: 26.9 PG (ref 26.6–33)
MCHC RBC AUTO-ENTMCNC: 32.8 G/DL (ref 31.5–35.7)
MCV RBC AUTO: 82.2 FL (ref 79–97)
PLATELET # BLD AUTO: 252 10*3/MM3 (ref 140–450)
PMV BLD AUTO: 11.3 FL (ref 6–12)
POTASSIUM SERPL-SCNC: 4 MMOL/L (ref 3.5–5.2)
PROT SERPL-MCNC: 6.3 G/DL (ref 6–8.5)
RBC # BLD AUTO: 3.6 10*6/MM3 (ref 3.77–5.28)
SODIUM SERPL-SCNC: 141 MMOL/L (ref 136–145)
TIBC SERPL-MCNC: 590 MCG/DL (ref 298–536)
TRANSFERRIN SERPL-MCNC: 396 MG/DL (ref 200–360)
WBC NRBC COR # BLD: 5.11 10*3/MM3 (ref 3.4–10.8)

## 2022-10-26 PROCEDURE — 83540 ASSAY OF IRON: CPT

## 2022-10-26 PROCEDURE — 80053 COMPREHEN METABOLIC PANEL: CPT

## 2022-10-26 PROCEDURE — 85027 COMPLETE CBC AUTOMATED: CPT

## 2022-10-26 PROCEDURE — 84466 ASSAY OF TRANSFERRIN: CPT

## 2022-10-26 PROCEDURE — 99214 OFFICE O/P EST MOD 30 MIN: CPT | Performed by: INTERNAL MEDICINE

## 2022-10-26 RX ORDER — GABAPENTIN 600 MG/1
TABLET ORAL
Qty: 180 TABLET | Refills: 1 | Status: SHIPPED | OUTPATIENT
Start: 2022-10-26

## 2022-10-26 RX ORDER — CELECOXIB 200 MG/1
CAPSULE ORAL
Qty: 90 CAPSULE | Refills: 1 | Status: SHIPPED | OUTPATIENT
Start: 2022-10-26 | End: 2022-11-01

## 2022-10-26 RX ORDER — FENOFIBRATE 160 MG/1
160 TABLET ORAL DAILY
Qty: 90 TABLET | Refills: 1 | Status: SHIPPED | OUTPATIENT
Start: 2022-10-26

## 2022-10-26 RX ORDER — ALBUTEROL SULFATE 90 UG/1
2 AEROSOL, METERED RESPIRATORY (INHALATION) EVERY 4 HOURS PRN
Qty: 18 G | Refills: 11 | Status: SHIPPED | OUTPATIENT
Start: 2022-10-26

## 2022-10-26 RX ORDER — FLUTICASONE PROPIONATE AND SALMETEROL XINAFOATE 115; 21 UG/1; UG/1
2 AEROSOL, METERED RESPIRATORY (INHALATION)
Qty: 1 EACH | Refills: 11 | Status: SHIPPED | OUTPATIENT
Start: 2022-10-26 | End: 2022-11-08

## 2022-10-26 NOTE — PROGRESS NOTES
Chief Complaint  Hyperlipidemia (F/u), Peripheral Neuropathy (F/u), Med Refill, and Asthma (Breo is too expensive need to discuss alternative.)    Subjective          Jyoti Puga presents to [facility]  History of Present Illness  Hyperlipidemia - she is on fenofibrate. Last FLP was 4/22 and LDL 78, . AST was 40. She has lost some weight since 4/22 intentionally. Doing some execise      Neuropathy and S1 radiculopathy on the left-on gabapentin 600 mg twice a day.  She also will use the tizanidine as needed if the left foot feels like it cramps. She occasionally misses a dose, so still has. She is seeing a chiropractor which has helped     B foot pain - she had seen podiatry in the past and told she had osteoarthritis.  She has also seen [facility] gave her injection in left foot, but were very painful and doesn't want to do again. we had started Celebrex last visit and she does feel it is helping     fe def anemia - had endoscopy done in 2/20 which showed antral ulcers.  She has not used iron intermittently but has been off for some time.  No stomach pain.  No melena     Asthma - she is using albuterol prn (but has and had been on breo but was ~$300 so has not filled. She uses it most days and does feel it is helps      Nephrolithiasis-she felt like she passed stone, had pain on left side but resolved. She sees  urologist in [place]- Dr Whaeln - she has a f/u next month       Current Outpatient Medications:   •  albuterol sulfate  (90 Base) MCG/ACT inhaler, Inhale 2 puffs Every 4 (Four) Hours As Needed for Wheezing., Disp: 18 g, Rfl: 11  •  celecoxib (CeleBREX) 200 MG capsule, 1 qd w/ food as needed for pain. Stop ibuprofen, Disp: 90 capsule, Rfl: 1  •  Cholecalciferol (vitamin D3) 125 MCG (5000 UT) capsule capsule, Take 5,000 Units by mouth Every 7 (Seven) Days., Disp: , Rfl:   •  diclofenac (VOLTAREN) 1 % gel gel, Apply 4 g topically to the appropriate area as directed  "3 (Three) Times a Day. Apply to foot, Disp: 100 g, Rfl: 11  •  fenofibrate 160 MG tablet, Take 1 tablet by mouth Daily., Disp: 90 tablet, Rfl: 1  •  gabapentin (NEURONTIN) 600 MG tablet, TAKE 1 TABLET BY MOUTH TWICE A DAY, Disp: 180 tablet, Rfl: 1  •  RABEprazole (ACIPHEX) 20 MG EC tablet, Take 1 tablet by mouth Daily., Disp: 90 tablet, Rfl: 3  •  tamsulosin (FLOMAX) 0.4 MG capsule 24 hr capsule, Take 1 capsule by mouth Every Night., Disp: 30 capsule, Rfl: 0  •  fluticasone-salmeterol (Advair HFA) 115-21 MCG/ACT inhaler, Inhale 2 puffs 2 (Two) Times a Day., Disp: 1 each, Rfl: 11      Objective   Vital Signs:   /72 (BP Location: Left arm, Patient Position: Sitting)   Pulse 60   Temp 96.9 °F (36.1 °C) (Infrared)   Ht 166.9 cm (65.7\")   Wt 102 kg (225 lb)   SpO2 99%   BMI 36.65 kg/m²       Physical exam  Constitutional: oriented to person, place, and time.  well-developed and well-nourished. No distress.   HENT:   Head: Normocephalic and atraumatic.   Eyes: Conjunctivae and EOM are normal.   Cardiovascular: Normal rate, regular rhythm and normal heart sounds.  Exam reveals no gallop and no friction rub.    No murmur heard.  Pulmonary/Chest: Effort normal and breath sounds normal. No respiratory distress.   no wheezes.   Neurological:  alert and oriented to person, place, and time.   Skin: Skin is warm and dry. not diaphoretic.   Psychiatric:  normal mood and affect. behavior is normal. Judgment and thought content normal.      Result Review :   The following data was reviewed by: Waleska Miller MD on 10/26/2022:  CMP    CMP 4/25/22   Glucose 79   BUN 15   Creatinine 0.78   Sodium 141   Potassium 3.9   Chloride 103   Calcium 9.8   Albumin 4.70   Total Bilirubin 0.6   Alkaline Phosphatase 38 (A)   AST (SGOT) 40 (A)   ALT (SGPT) 30   (A) Abnormal value            CBC    CBC 4/25/22   WBC 5.90   RBC 4.94   Hemoglobin 14.0   Hematocrit 43.3   MCV 87.7   MCH 28.3   MCHC 32.3   RDW 14.8   Platelets 237       "     Lipid Panel    Lipid Panel 4/25/22   Total Cholesterol 151   Triglycerides 110   HDL Cholesterol 53   VLDL Cholesterol 20   LDL Cholesterol  78   LDL/HDL Ratio 1.43           TSH    TSH 4/25/22   TSH 2.620           Lab Results   Component Value Date    QNOF81DI 44.9 10/15/2020    OEXJVEYX85 1,030 (H) 12/19/2018               Assessment and Plan    Diagnoses and all orders for this visit:    1. Neuropathy (Primary)- stable on gabapentin and seeing chiropractor. Pt has already signed controlled substance contract. Óscar done today and appropriate.   -     gabapentin (NEURONTIN) 600 MG tablet; TAKE 1 TABLET BY MOUTH TWICE A DAY  Dispense: 180 tablet; Refill: 1    2. Pure hypercholesterolemia- recheck flp in 4/23  -     fenofibrate 160 MG tablet; Take 1 tablet by mouth Daily.  Dispense: 90 tablet; Refill: 1    3. High risk medications (not anticoagulants) long-term use  -     CBC (No Diff); Future  -     Comprehensive Metabolic Panel; Future    4. Asthma, mild intermittent, well-controlled- we will see if advair is chepaer for her than the breo  -     albuterol sulfate  (90 Base) MCG/ACT inhaler; Inhale 2 puffs Every 4 (Four) Hours As Needed for Wheezing.  Dispense: 18 g; Refill: 11  -     fluticasone-salmeterol (Advair HFA) 115-21 MCG/ACT inhaler; Inhale 2 puffs 2 (Two) Times a Day.  Dispense: 1 each; Refill: 11    5. Bilateral foot pain  -     celecoxib (CeleBREX) 200 MG capsule; 1 qd w/ food as needed for pain. Stop ibuprofen  Dispense: 90 capsule; Refill: 1          Follow Up   Return in about 6 months (around 4/26/2023) for Medicare Wellness.  Patient was given instructions and counseling regarding her condition or for health maintenance advice. Please see specific information pulled into the AVS if appropriate.     Prev - she declines flu and prevnar-20 today

## 2022-11-01 ENCOUNTER — TELEPHONE (OUTPATIENT)
Dept: INTERNAL MEDICINE | Facility: CLINIC | Age: 67
End: 2022-11-01

## 2022-11-01 DIAGNOSIS — D50.8 OTHER IRON DEFICIENCY ANEMIA: Primary | ICD-10-CM

## 2022-11-01 NOTE — TELEPHONE ENCOUNTER
----- Message from Waleska Miller MD sent at 11/1/2022  7:47 AM EDT -----  Can you let pt know, her labs are showing anemia and low iron again. If she can stop the celebrex, as it may be causing some bleeding in her stomach/GI tract. ALso, we better get her back in for a colonoscopy and endoscopy with Dr Sandoval. I will get these scheduled if that is ok w/ her. She can also restart over the counter iron supplement - ferrous sulfate 325mg one daily

## 2022-11-07 ENCOUNTER — TELEPHONE (OUTPATIENT)
Dept: INTERNAL MEDICINE | Facility: CLINIC | Age: 67
End: 2022-11-07

## 2022-11-07 NOTE — TELEPHONE ENCOUNTER
HUB RELAYED MESSAGE AND PATIENT VERBALIZED UNDERSTANDING AND SCHEDULED A VIRTUAL APPOINTMENT WITH STEPHANIE ARMIJO ON 11-8-22

## 2022-11-07 NOTE — TELEPHONE ENCOUNTER
Called and lvm for patient to return call, office number given.     HUB: if patient returns call please schedule her for a telehealth appointment before medications can be called to the pharmacy.

## 2022-11-07 NOTE — TELEPHONE ENCOUNTER
Caller: Jyoti Puga    Relationship: Self    Best call back number: [phone]    What medication are you requesting: ANTIBIOTICS     What are your current symptoms: BODY ACHE AND COUGH    How long have you been experiencing symptoms: FOR ABOUT FOR OR FIVE DAYS    Have you had these symptoms before:    [x] Yes  [] No    Have you been treated for these symptoms before:   [x] Yes  [] No    If a prescription is needed, what is your preferred pharmacy and phone number: [address] [phone] [facility] [phone]      Additional notes:

## 2022-11-08 ENCOUNTER — TELEMEDICINE (OUTPATIENT)
Dept: INTERNAL MEDICINE | Facility: CLINIC | Age: 67
End: 2022-11-08

## 2022-11-08 DIAGNOSIS — J06.9 UPPER RESPIRATORY TRACT INFECTION, UNSPECIFIED TYPE: Primary | ICD-10-CM

## 2022-11-08 PROCEDURE — 99441 PR PHYS/QHP TELEPHONE EVALUATION 5-10 MIN: CPT | Performed by: PHYSICIAN ASSISTANT

## 2022-11-08 RX ORDER — BENZONATATE 200 MG/1
200 CAPSULE ORAL 3 TIMES DAILY PRN
Qty: 30 CAPSULE | Refills: 0 | Status: SHIPPED | OUTPATIENT
Start: 2022-11-08 | End: 2022-11-18

## 2022-11-08 RX ORDER — AZITHROMYCIN 250 MG/1
TABLET, FILM COATED ORAL
Qty: 6 TABLET | Refills: 0 | Status: SHIPPED | OUTPATIENT
Start: 2022-11-08

## 2022-11-08 NOTE — ASSESSMENT & PLAN NOTE
Supportive care, stay hydrated, rest.  Follow up if symptoms worsen or persist. Monitor for new symptoms. Go to the ER for respiratory distress or severe symptoms.  Z-Ray x5 days, Tessalon as needed, consider chest x-ray if symptoms worsen or persist

## 2022-11-08 NOTE — PROGRESS NOTES
Mode of Visit: Video  Location of patient: Home address  Location of provider: [facility] office at [address], [place] [address]  You have chosen to receive care through a telehealth visit.  Do you consent to use a audio/video connection for your medical care today? Yes  This was an audio only enabled telemedicine encounter.  No technical issues occurred during this visit.    Chief Complaint  Cough    Subjective          History of Present Illness  Jyoti Puga presents to [facility] for   History of Present Illness  Cough:  Has been sick since Tuesday last week with cough and body aches. Has not had fever yet with illness. Has not taken Covid test at home. Has been coughing up stuff. Does have hx of asthma but has not had to use albuterol with illness. Sx are not improving. No n/v/d.         The following portions of the patient's history were reviewed and updated as appropriate: allergies, current medications, past family history, past medical history, past social history, past surgical history and problem list.    Allergies   Allergen Reactions   • Codeine Nausea Only       Current Outpatient Medications:   •  albuterol sulfate  (90 Base) MCG/ACT inhaler, Inhale 2 puffs Every 4 (Four) Hours As Needed for Wheezing., Disp: 18 g, Rfl: 11  •  azithromycin (Zithromax Z-Ray) 250 MG tablet, Take 2 tablets by mouth on day 1, then 1 tablet daily on days 2-5, Disp: 6 tablet, Rfl: 0  •  benzonatate (TESSALON) 200 MG capsule, Take 1 capsule by mouth 3 (Three) Times a Day As Needed for Cough for up to 10 days., Disp: 30 capsule, Rfl: 0  •  Cholecalciferol (vitamin D3) 125 MCG (5000 UT) capsule capsule, Take 5,000 Units by mouth Every 7 (Seven) Days., Disp: , Rfl:   •  diclofenac (VOLTAREN) 1 % gel gel, Apply 4 g topically to the appropriate area as directed 3 (Three) Times a Day. Apply to foot, Disp: 100 g, Rfl: 11  •  fenofibrate 160 MG tablet, Take 1  tablet by mouth Daily., Disp: 90 tablet, Rfl: 1  •  gabapentin (NEURONTIN) 600 MG tablet, TAKE 1 TABLET BY MOUTH TWICE A DAY, Disp: 180 tablet, Rfl: 1  •  RABEprazole (ACIPHEX) 20 MG EC tablet, Take 1 tablet by mouth Daily., Disp: 90 tablet, Rfl: 3  New Medications Ordered This Visit   Medications   • benzonatate (TESSALON) 200 MG capsule     Sig: Take 1 capsule by mouth 3 (Three) Times a Day As Needed for Cough for up to 10 days.     Dispense:  30 capsule     Refill:  0   • azithromycin (Zithromax Z-Ray) 250 MG tablet     Sig: Take 2 tablets by mouth on day 1, then 1 tablet daily on days 2-5     Dispense:  6 tablet     Refill:  0     Social History     Tobacco Use   Smoking Status Former   • Packs/day: 1.50   • Years: 37.00   • Pack years: 55.50   • Types: Cigarettes   • Start date: 1/1/1975   • Quit date: 1/1/2012   • Years since quitting: 10.8   Smokeless Tobacco Never   Tobacco Comments    16-60        Objective   There were no vitals filed for this visit.  There is no height or weight on file to calculate BMI.    Physical Exam   Constitutional: No distress.   Pulmonary/Chest: Effort normal. No stridor.  She no audible wheeze...  Neurological: She is alert.   Psychiatric: She has a normal mood and affect.       Result Review :{ Labs  Result Review  Imaging  Med Tab  Media :23}        Assessment and Plan    Diagnoses and all orders for this visit:    1. Upper respiratory tract infection, unspecified type (Primary)  Assessment & Plan:  Supportive care, stay hydrated, rest.  Follow up if symptoms worsen or persist. Monitor for new symptoms. Go to the ER for respiratory distress or severe symptoms.  Z-Ray x5 days, Tessalon as needed, consider chest x-ray if symptoms worsen or persist    Orders:  -     benzonatate (TESSALON) 200 MG capsule; Take 1 capsule by mouth 3 (Three) Times a Day As Needed for Cough for up to 10 days.  Dispense: 30 capsule; Refill: 0  -     azithromycin (Zithromax Z-Ray) 250 MG tablet; Take  2 tablets by mouth on day 1, then 1 tablet daily on days 2-5  Dispense: 6 tablet; Refill: 0          Follow Up   Return if symptoms worsen or fail to improve.    This visit has been rescheduled as a phone visit to comply with patient safety concerns in accordance with CDC recommendations. Total time of discussion was 8 minutes.        Follow up if symptoms worsen or persist or has new or concerning symptoms, go to ER for severe symptoms.   I discussed my findings, recommendations, and plan of care with the patient. Reviewed common medication effects and side effects and to report side effects immediately. Encouraged medication compliance and the importance of keeping scheduled follow up appointments. Pt verbalized understanding and agreement.  If a referral was made please contact our office if you have not heard about an appointment in the next 2 weeks.   If labs or images are ordered we will contact you with the results within the next week.  If you have not heard from us after a week please call our office to inquire about the results.     I have reviewed the limitations of a telehealth visit (such as lack of a physical exam and unable to obtain vital signs) and advised the patient that they may need to follow up for an office visit should their symptoms or concerns persist, worsen, or change.    Chica Whitt PA-C    * Please note that portions of this note were completed with a voice recognition program.

## 2022-11-22 ENCOUNTER — HOSPITAL ENCOUNTER (OUTPATIENT)
Dept: MAMMOGRAPHY | Facility: HOSPITAL | Age: 67
Discharge: HOME | End: 2022-11-22
Admitting: INTERNAL MEDICINE

## 2022-11-22 DIAGNOSIS — Z12.31 VISIT FOR SCREENING MAMMOGRAM: ICD-10-CM

## 2022-11-22 PROCEDURE — 77063 BREAST TOMOSYNTHESIS BI: CPT

## 2022-11-22 PROCEDURE — 77067 SCR MAMMO BI INCL CAD: CPT

## 2022-11-22 PROCEDURE — 77067 SCR MAMMO BI INCL CAD: CPT | Performed by: RADIOLOGY

## 2022-11-22 PROCEDURE — 77063 BREAST TOMOSYNTHESIS BI: CPT | Performed by: RADIOLOGY

## 2022-11-28 RX ORDER — SODIUM, POTASSIUM,MAG SULFATES 17.5-3.13G
SOLUTION, RECONSTITUTED, ORAL ORAL
Qty: 354 ML | Refills: 0 | Status: SHIPPED | OUTPATIENT
Start: 2022-11-28

## 2022-12-05 ENCOUNTER — TELEPHONE (OUTPATIENT)
Dept: GASTROENTEROLOGY | Facility: CLINIC | Age: 67
End: 2022-12-05

## 2022-12-05 ENCOUNTER — OUTSIDE FACILITY SERVICE (OUTPATIENT)
Dept: GASTROENTEROLOGY | Facility: CLINIC | Age: 67
End: 2022-12-05

## 2022-12-05 PROCEDURE — 45378 DIAGNOSTIC COLONOSCOPY: CPT | Performed by: INTERNAL MEDICINE

## 2022-12-05 PROCEDURE — 43239 EGD BIOPSY SINGLE/MULTIPLE: CPT | Performed by: INTERNAL MEDICINE

## 2022-12-05 RX ORDER — RABEPRAZOLE SODIUM 20 MG/1
20 TABLET, DELAYED RELEASE ORAL 2 TIMES DAILY
Qty: 60 TABLET | Refills: 11 | Status: SHIPPED | OUTPATIENT
Start: 2022-12-05 | End: 2023-02-07

## 2023-02-07 ENCOUNTER — OUTSIDE FACILITY SERVICE (OUTPATIENT)
Dept: GASTROENTEROLOGY | Facility: CLINIC | Age: 68
End: 2023-02-07
Payer: MEDICARE

## 2023-02-07 PROCEDURE — 43239 EGD BIOPSY SINGLE/MULTIPLE: CPT | Performed by: INTERNAL MEDICINE

## 2023-02-07 RX ORDER — RABEPRAZOLE SODIUM 20 MG/1
20 TABLET, DELAYED RELEASE ORAL 2 TIMES DAILY
Qty: 180 TABLET | Refills: 3 | Status: SHIPPED | OUTPATIENT
Start: 2023-02-07

## 2023-06-12 ENCOUNTER — TELEPHONE (OUTPATIENT)
Dept: INTERNAL MEDICINE | Facility: CLINIC | Age: 68
End: 2023-06-12

## 2023-06-12 ENCOUNTER — OFFICE VISIT (OUTPATIENT)
Dept: INTERNAL MEDICINE | Facility: CLINIC | Age: 68
End: 2023-06-12
Payer: MEDICARE

## 2023-06-12 ENCOUNTER — LAB (OUTPATIENT)
Dept: INTERNAL MEDICINE | Facility: CLINIC | Age: 68
End: 2023-06-12
Payer: MEDICARE

## 2023-06-12 VITALS
WEIGHT: 230 LBS | SYSTOLIC BLOOD PRESSURE: 124 MMHG | DIASTOLIC BLOOD PRESSURE: 84 MMHG | HEART RATE: 68 BPM | BODY MASS INDEX: 36.96 KG/M2 | OXYGEN SATURATION: 94 % | TEMPERATURE: 97.8 F | HEIGHT: 66 IN

## 2023-06-12 DIAGNOSIS — Z78.0 POSTMENOPAUSAL: ICD-10-CM

## 2023-06-12 DIAGNOSIS — E55.9 VITAMIN D DEFICIENCY: ICD-10-CM

## 2023-06-12 DIAGNOSIS — Z00.00 WELCOME TO MEDICARE PREVENTIVE VISIT: Primary | ICD-10-CM

## 2023-06-12 DIAGNOSIS — R06.02 SOB (SHORTNESS OF BREATH): ICD-10-CM

## 2023-06-12 DIAGNOSIS — M79.671 BILATERAL FOOT PAIN: ICD-10-CM

## 2023-06-12 DIAGNOSIS — E78.00 PURE HYPERCHOLESTEROLEMIA: ICD-10-CM

## 2023-06-12 DIAGNOSIS — R73.09 ELEVATED GLUCOSE: ICD-10-CM

## 2023-06-12 DIAGNOSIS — F51.01 PRIMARY INSOMNIA: ICD-10-CM

## 2023-06-12 DIAGNOSIS — K22.70 BARRETT'S ESOPHAGUS WITHOUT DYSPLASIA: ICD-10-CM

## 2023-06-12 DIAGNOSIS — M79.672 BILATERAL FOOT PAIN: ICD-10-CM

## 2023-06-12 DIAGNOSIS — Z23 NEED FOR PNEUMOCOCCAL 20-VALENT CONJUGATE VACCINATION: ICD-10-CM

## 2023-06-12 DIAGNOSIS — E78.00 PURE HYPERCHOLESTEROLEMIA: Chronic | ICD-10-CM

## 2023-06-12 DIAGNOSIS — I44.7 LEFT BUNDLE BRANCH BLOCK: ICD-10-CM

## 2023-06-12 DIAGNOSIS — J45.20 ASTHMA, MILD INTERMITTENT, WELL-CONTROLLED: ICD-10-CM

## 2023-06-12 DIAGNOSIS — G62.9 NEUROPATHY: ICD-10-CM

## 2023-06-12 DIAGNOSIS — Z87.891 PERSONAL HISTORY OF TOBACCO USE, PRESENTING HAZARDS TO HEALTH: ICD-10-CM

## 2023-06-12 PROBLEM — E66.9 OBESITY (BMI 35.0-39.9 WITHOUT COMORBIDITY): Status: ACTIVE | Noted: 2023-06-12

## 2023-06-12 LAB
25(OH)D3 SERPL-MCNC: 56.7 NG/ML (ref 30–100)
ALBUMIN SERPL-MCNC: 4.7 G/DL (ref 3.5–5.2)
ALBUMIN/GLOB SERPL: 2 G/DL
ALP SERPL-CCNC: 43 U/L (ref 39–117)
ALT SERPL W P-5'-P-CCNC: 20 U/L (ref 1–33)
ANION GAP SERPL CALCULATED.3IONS-SCNC: 10.2 MMOL/L (ref 5–15)
AST SERPL-CCNC: 27 U/L (ref 1–32)
BILIRUB SERPL-MCNC: 0.6 MG/DL (ref 0–1.2)
BUN SERPL-MCNC: 14 MG/DL (ref 8–23)
BUN/CREAT SERPL: 21.5 (ref 7–25)
CALCIUM SPEC-SCNC: 11.1 MG/DL (ref 8.6–10.5)
CHLORIDE SERPL-SCNC: 100 MMOL/L (ref 98–107)
CHOLEST SERPL-MCNC: 190 MG/DL (ref 0–200)
CHROMATIN AB SERPL-ACNC: <10 IU/ML (ref 0–14)
CO2 SERPL-SCNC: 30.8 MMOL/L (ref 22–29)
CREAT SERPL-MCNC: 0.65 MG/DL (ref 0.57–1)
DEPRECATED RDW RBC AUTO: 41.2 FL (ref 37–54)
EGFRCR SERPLBLD CKD-EPI 2021: 97.8 ML/MIN/1.73
ERYTHROCYTE [DISTWIDTH] IN BLOOD BY AUTOMATED COUNT: 12.4 % (ref 12.3–15.4)
GLOBULIN UR ELPH-MCNC: 2.4 GM/DL
GLUCOSE SERPL-MCNC: 105 MG/DL (ref 65–99)
HBA1C MFR BLD: 5.8 % (ref 4.8–5.6)
HCT VFR BLD AUTO: 43.3 % (ref 34–46.6)
HDLC SERPL-MCNC: 69 MG/DL (ref 40–60)
HGB BLD-MCNC: 14.5 G/DL (ref 12–15.9)
LDLC SERPL CALC-MCNC: 101 MG/DL (ref 0–100)
LDLC/HDLC SERPL: 1.43 {RATIO}
MCH RBC QN AUTO: 30.3 PG (ref 26.6–33)
MCHC RBC AUTO-ENTMCNC: 33.5 G/DL (ref 31.5–35.7)
MCV RBC AUTO: 90.6 FL (ref 79–97)
PLATELET # BLD AUTO: 235 10*3/MM3 (ref 140–450)
PMV BLD AUTO: 11.5 FL (ref 6–12)
POTASSIUM SERPL-SCNC: 4.7 MMOL/L (ref 3.5–5.2)
PROT SERPL-MCNC: 7.1 G/DL (ref 6–8.5)
RBC # BLD AUTO: 4.78 10*6/MM3 (ref 3.77–5.28)
SODIUM SERPL-SCNC: 141 MMOL/L (ref 136–145)
TRIGL SERPL-MCNC: 111 MG/DL (ref 0–150)
TSH SERPL DL<=0.05 MIU/L-ACNC: 2.69 UIU/ML (ref 0.27–4.2)
VLDLC SERPL-MCNC: 20 MG/DL (ref 5–40)
WBC NRBC COR # BLD: 6.39 10*3/MM3 (ref 3.4–10.8)

## 2023-06-12 PROCEDURE — 80053 COMPREHEN METABOLIC PANEL: CPT | Performed by: INTERNAL MEDICINE

## 2023-06-12 PROCEDURE — 36415 COLL VENOUS BLD VENIPUNCTURE: CPT | Performed by: INTERNAL MEDICINE

## 2023-06-12 PROCEDURE — 83036 HEMOGLOBIN GLYCOSYLATED A1C: CPT | Performed by: INTERNAL MEDICINE

## 2023-06-12 PROCEDURE — 80061 LIPID PANEL: CPT | Performed by: INTERNAL MEDICINE

## 2023-06-12 PROCEDURE — 84443 ASSAY THYROID STIM HORMONE: CPT | Performed by: INTERNAL MEDICINE

## 2023-06-12 PROCEDURE — 85027 COMPLETE CBC AUTOMATED: CPT | Performed by: INTERNAL MEDICINE

## 2023-06-12 PROCEDURE — 86431 RHEUMATOID FACTOR QUANT: CPT | Performed by: INTERNAL MEDICINE

## 2023-06-12 PROCEDURE — 82306 VITAMIN D 25 HYDROXY: CPT | Performed by: INTERNAL MEDICINE

## 2023-06-12 RX ORDER — DIPHENHYDRAMINE HCL 25 MG
25 CAPSULE ORAL DAILY
COMMUNITY

## 2023-06-12 RX ORDER — ALBUTEROL SULFATE 0.63 MG/3ML
1 SOLUTION RESPIRATORY (INHALATION) EVERY 6 HOURS PRN
Qty: 100 EACH | Refills: 12 | Status: SHIPPED | OUTPATIENT
Start: 2023-06-12

## 2023-06-12 RX ORDER — FENOFIBRATE 160 MG/1
160 TABLET ORAL DAILY
Qty: 90 TABLET | Refills: 1 | Status: CANCELLED | OUTPATIENT
Start: 2023-06-12

## 2023-06-12 RX ORDER — FERROUS SULFATE 325(65) MG
325 TABLET ORAL
COMMUNITY

## 2023-06-12 RX ORDER — SENNOSIDES 8.6 MG
650 CAPSULE ORAL DAILY
COMMUNITY

## 2023-06-12 RX ORDER — MULTIPLE VITAMINS W/ MINERALS TAB 9MG-400MCG
1 TAB ORAL DAILY
COMMUNITY

## 2023-06-12 RX ORDER — GABAPENTIN 600 MG/1
TABLET ORAL
Qty: 180 TABLET | Refills: 1 | Status: SHIPPED | OUTPATIENT
Start: 2023-06-12

## 2023-06-12 NOTE — TELEPHONE ENCOUNTER
"    Caller: Jyoti Puga \"Elsa\"    Relationship: Self    Best call back number: [phone]    Requested Prescriptions:   THE MEDICATION FOR THE NEBULIZER    Pharmacy where request should be sent: [facility] [address] - [phone] [facility] [phone] [facility]     Last office visit with prescribing clinician: 6/12/2023   Last telemedicine visit with prescribing clinician: Visit date not found   Next office visit with prescribing clinician: 12/13/2023     Additional details provided by patient: THE PATIENT STATES THAT WHEN SHE SAW THE DOCTOR LAST THEY WERE GOING TO PRESCRIBE HER MEDICATION FOR HER NEBULIZER SHE THINKS IT WAS ALBUTEROL BUT SHE IS NOT SURE THE PATIENT WOULD LIKE TO KNOW IF THAT WAS CALLED IN THE PHARMACY STATES THAT THEY DO NOT HAVE A PRESCRIPTION     Does the patient have less than a 3 day supply:  [x] Yes  [] No    Would you like a call back once the refill request has been completed: [] Yes [x] No    If the office needs to give you a call back, can they leave a voicemail: [] Yes [x] No    Evelyne Love Rep   06/12/23 10:33 EDT           "

## 2023-06-12 NOTE — PROGRESS NOTES
The ABCs of the Annual Wellness Visit  [place] to Medicare Visit    Subjective     Jyoti Puga is a 67 y.o. female who presents for a  Welcome to Medicare Visit.    The following portions of the patient's history were reviewed and   updated as appropriate: allergies, current medications, past family history, past medical history, past social history, past surgical history, and problem list.     Compared to one year ago, the patient feels her physical   health is the same.    Compared to one year ago, the patient feels her mental   health is the same.    Recent Hospitalizations:  She was not admitted to the hospital during the last year.       Current Medical Providers:  Patient Care Team:  Waleska Miller MD as PCP - General (Internal Medicine)  Navin Aguirre MD as Consulting Physician (Gastroenterology)  Adan Whalen MD as Consulting Physician (Urology)  Romeo Díaz MD as Consulting Physician (Ophthalmology)    Outpatient Medications Prior to Visit   Medication Sig Dispense Refill    acetaminophen (TYLENOL) 650 MG 8 hr tablet Take 1 tablet by mouth Daily.      Cholecalciferol (vitamin D3) 125 MCG (5000 UT) capsule capsule Take 1 capsule by mouth Every 7 (Seven) Days.      diclofenac (VOLTAREN) 1 % gel gel Apply 4 g topically to the appropriate area as directed 3 (Three) Times a Day. Apply to foot 100 g 11    diphenhydrAMINE (BENADRYL) 25 mg capsule Take 1 capsule by mouth Daily.      fenofibrate 160 MG tablet Take 1 tablet by mouth Daily. 90 tablet 1    ferrous sulfate 325 (65 FE) MG tablet Take 1 tablet by mouth Daily With Breakfast.      multivitamin with minerals (Centrum Silver 50+Women) tablet tablet Take 1 tablet by mouth Daily.      RABEprazole (ACIPHEX) 20 MG EC tablet Take 1 tablet by mouth 2 (Two) Times a Day. 180 tablet 3    gabapentin (NEURONTIN) 600 MG tablet TAKE 1 TABLET BY MOUTH TWICE A  tablet 1    albuterol sulfate  (90 Base) MCG/ACT inhaler  Inhale 2 puffs Every 4 (Four) Hours As Needed for Wheezing. 18 g 11    azithromycin (Zithromax Z-Ray) 250 MG tablet Take 2 tablets by mouth on day 1, then 1 tablet daily on days 2-5 6 tablet 0    sodium-potassium-magnesium sulfates (Suprep Bowel Prep Kit) 17.5-3.13-1.6 GM/177ML solution oral solution Please follow the directions in the letter mailed to you by our office for colonoscopy prep. If you have any questions call our office at [phone]. 354 mL 0     No facility-administered medications prior to visit.       No opioid medication identified on active medication list. I have reviewed chart for other potential  high risk medication/s and harmful drug interactions in the elderly.        Aspirin is not on active medication list.  Aspirin use is indicated based on review of current medical condition/s. Pros and cons of this therapy have been discussed with this patient. Benefits of this medication outweigh potential harm.  Patient has been instructed to start taking this medication..  Since she does have coronary calcifications we will have her start a baby aspirin daily  With food  Patient Active Problem List   Diagnosis    Gastroesophageal reflux disease    Estrada's esophagus    Elevated glucose    Swelling of lymph nodes    Pure hypercholesterolemia    Nephrolithiasis    PUD (peptic ulcer disease)    Neuropathy    LAD (lymphadenopathy), cervical    Vitamin D deficiency    Current mild episode of major depressive disorder without prior episode    Gastroparesis    History of adenomatous polyp of colon    Diverticulitis    Peripheral neuropathy    Mild asthma    Absolute anemia    Upper respiratory tract infection    Obesity (BMI 35.0-39.9 without comorbidity)     Advance Care Planning   Advance Care Planning     Advance Directive is not on file.  ACP discussion was held with the patient during this visit. Patient does not have an advance directive, information provided.       Objective   Vitals:    06/12/23  "0848   BP: 124/84   BP Location: Right arm   Patient Position: Sitting   Pulse: 68   Temp: 97.8 °F (36.6 °C)   TempSrc: Infrared   SpO2: 94%   Weight: 104 kg (230 lb)   Height: 166.7 cm (65.62\")   PainSc:   8   PainLoc: Foot  Comment: left     Estimated body mass index is 37.55 kg/m² as calculated from the following:    Height as of this encounter: 166.7 cm (65.62\").    Weight as of this encounter: 104 kg (230 lb).    Class 2 Severe Obesity (BMI >=35 and <=39.9). Obesity-related health conditions include the following: GERD. Obesity is unchanged. BMI is is above average; BMI management plan is completed. We discussed portion control and increasing exercise.      Does the patient have evidence of cognitive impairment?   No    Lab Results   Component Value Date    TRIG 111 2023    HDL 69 (H) 2023     (H) 2023    VLDL 20 2023    HGBA1C 5.80 (H) 2023         ECG 12 Lead    Date/Time: 2023 9:23 AM  Performed by: Waleska Miller MD  Authorized by: Waleska Miller MD            HEALTH RISK ASSESSMENT    Smoking Status:  Social History     Tobacco Use   Smoking Status Former    Packs/day: 1.50    Years: 37.00    Pack years: 55.50    Types: Cigarettes    Start date: 1975    Quit date: 2012    Years since quittin.4   Smokeless Tobacco Never   Tobacco Comments    [age]     Alcohol Consumption:  Social History     Substance and Sexual Activity   Alcohol Use No       Fall Risk Screen:    STEADI Fall Risk Assessment was completed, and patient is at LOW risk for falls.Assessment completed on:2023    Depression Screen:       2023     8:52 AM   PHQ-2/PHQ-9 Depression Screening   Little Interest or Pleasure in Doing Things 3-->nearly every day   Feeling Down, Depressed or Hopeless 1-->several days   Trouble Falling or Staying Asleep, or Sleeping Too Much 3-->nearly every day   Feeling Tired or Having Little Energy 1-->several days   Poor Appetite or Overeating " 1-->several days   Feeling Bad about Yourself - or that You are a Failure or Have Let Yourself or Your Family Down 0-->not at all   Trouble Concentrating on Things, Such as Reading the Newspaper or Watching Television 0-->not at all   Moving or Speaking So Slowly that Other People Could Have Noticed? Or the Opposite - Being So Fidgety 0-->not at all   Thoughts that You Would be Better Off Dead or of Hurting Yourself in Some Way 0-->not at all   PHQ-9: Brief Depression Severity Measure Score 9   If You Checked Off Any Problems, How Difficult Have These Problems Made It For You to Do Your Work, Take Care of Things at Home, or Get Along with Other People? not difficult at all       Health Habits and Functional and Cognitive Screenin/12/2023     8:44 AM   Functional & Cognitive Status   Do you have difficulty preparing food and eating? No   Do you have difficulty bathing yourself, getting dressed or grooming yourself? No   Do you have difficulty using the toilet? No   Do you have difficulty moving around from place to place? No   Do you have trouble with steps or getting out of a bed or a chair? No   Current Diet Unhealthy Diet   Dental Exam Not up to date        Dental Exam Comment false teeth   Eye Exam Up to date   Exercise (times per week) 2 times per week   Current Exercises Include Other        Exercise Comment senior classes at [facility]   Do you need help using the phone?  No   Are you deaf or do you have serious difficulty hearing?  No   Do you need help with transportation? No   Do you need help shopping? No   Do you need help preparing meals?  No   Do you need help with housework?  No   Do you need help with laundry? No   Do you need help taking your medications? No   Do you need help managing money? No   Do you ever drive or ride in a car without wearing a seat belt? No   Have you felt unusual stress, anger or loneliness in the last month? No   Who do you live with? Alone   If you need help, do you  have trouble finding someone available to you? No   Have you been bothered in the last four weeks by sexual problems? No   Do you have difficulty concentrating, remembering or making decisions? No       Visual Acuity:    Vision Screening    Right eye Left eye Both eyes   Without correction 20/30 20/50 20/25   With correction 20/25 20/25 20/25       Age-appropriate Screening Schedule:  Refer to the list below for future screening recommendations based on patient's age, sex and/or medical conditions. Orders for these recommended tests are listed in the plan section. The patient has been provided with a written plan.    Health Maintenance   Topic Date Due    ZOSTER VACCINE (1 of 2) Never done    ANNUAL WELLNESS VISIT  Never done    COVID-19 Vaccine (3 - Moderna series) 08/03/2021    LUNG CANCER SCREENING  05/17/2023    DXA SCAN  07/15/2023    INFLUENZA VACCINE  08/01/2023    LIPID PANEL  06/12/2024    MAMMOGRAM  11/22/2024    TDAP/TD VACCINES (3 - Td or Tdap) 05/23/2028    COLORECTAL CANCER SCREENING  12/06/2029    HEPATITIS C SCREENING  Completed    Pneumococcal Vaccine 65+  Completed    PAP SMEAR  Discontinued        CMS Preventative Services Quick Reference  Risk Factors Identified During Encounter    None Identified  The above risks/problems have been discussed with the patient.  Pertinent information has been shared with the patient in the After Visit Summary.    Follow Up:   Initial Medicare Visit in one year    An After Visit Summary and PPPS were made available to the patient.      Additional E&M Note during same encounter follows:  Patient has multiple medical problems which are significant and separately identifiable that require additional work above and beyond the Medicare Wellness Visit.      Chief Complaint  Welcome To Medicare (Wellness & Physical), neuropathy, Hyperlipidemia, Shortness of Breath (Would like to see if she can get a nebulizer.  Can't afford inhalers.), and Insomnia (Wants to discuss sleep  medication.)    Subjective        HPI  Jyoti Puga is also being seen today for:    Hyperlipidemia - she is on fenofibrate. Last FLP was 4/22 and LDL 78, . AST was 40. She is fasting today      Neuropathy and S1 radiculopathy on the left-had been on gabapentin 600 mg twice a day, filled last in 3/23.   She is seeing a chiropractor which has helped     B foot pain - she had seen podiatry in the past and told she had osteoarthritis.  She has also seen [facility] gave her injection in left foot, but were very painful and doesn't want to do again. she is going to try some insoles. She is using tylenol occasionally     Barretts- she had EGD in 2/23. She is on aciphex bid     Asthma - she is not using any inhalers - were too expensive. She would like to try a nebulizer instead. She has URENA, Occasional sob at night, no pedal edema. She does sleep w/ HOB elevated, but more because of the reflux      Nephrolithiasis-she  sees  urologist in [place]- Dr Whalen .    Insomnia - has had a lot of trouble in recent months. She has a little coffee in the morning; soda and tea very rarely. She tried melatonin but made her drowsy during the day    Exercise: she has been going to an exercise 2x/week for several months  Diet:not great- more salty foods, not a lot of red meat; is on Fe supplement, MVI daily, D. 1c coffee daily  etoh - rare, just occasionally      Review of Systems   Constitutional:  Negative for activity change, appetite change, fatigue and fever.   Respiratory:  Positive for chest tightness and shortness of breath.    Cardiovascular:  Negative for chest pain and leg swelling.   Gastrointestinal:  Negative for constipation and diarrhea.   Musculoskeletal:  Positive for arthralgias.   Skin: Negative.    Allergic/Immunologic: Negative for immunocompromised state.   Neurological:  Negative for dizziness, seizures, syncope, speech difficulty, weakness and confusion.   Psychiatric/Behavioral:  Positive for decreased  "concentration and dysphoric mood.      Objective   Vital Signs:  /84 (BP Location: Right arm, Patient Position: Sitting)   Pulse 68   Temp 97.8 °F (36.6 °C) (Infrared)   Ht 166.7 cm (65.62\")   Wt 104 kg (230 lb)   SpO2 94%   BMI 37.55 kg/m²     Physical Exam  Vitals and nursing note reviewed.   Constitutional:       General: She is not in acute distress.     Appearance: She is well-developed. She is not diaphoretic.   HENT:      Head: Normocephalic and atraumatic.      Right Ear: External ear normal.      Left Ear: External ear normal.      Nose: Nose normal.      Mouth/Throat:      Pharynx: No oropharyngeal exudate.   Eyes:      General: No scleral icterus.        Right eye: No discharge.         Left eye: No discharge.      Conjunctiva/sclera: Conjunctivae normal.      Pupils: Pupils are equal, round, and reactive to light.   Neck:      Thyroid: No thyromegaly.   Cardiovascular:      Rate and Rhythm: Normal rate and regular rhythm.      Heart sounds: Normal heart sounds. No murmur heard.    No friction rub. No gallop.   Pulmonary:      Effort: Pulmonary effort is normal. No respiratory distress.      Breath sounds: Normal breath sounds. No wheezing or rales.   Chest:   Breasts:     Right: No mass, nipple discharge, skin change or tenderness.      Left: No mass, nipple discharge, skin change or tenderness.   Abdominal:      General: Bowel sounds are normal. There is no distension.      Palpations: Abdomen is soft. There is no mass.      Tenderness: There is no abdominal tenderness. There is no guarding or rebound.   Musculoskeletal:         General: No deformity. Normal range of motion.      Cervical back: Normal range of motion and neck supple.   Lymphadenopathy:      Cervical: No cervical adenopathy.   Skin:     General: Skin is warm and dry.      Coloration: Skin is not pale.      Findings: No erythema or rash.   Neurological:      Mental Status: She is alert and oriented to person, place, and time. "      Coordination: Coordination normal.      Deep Tendon Reflexes: Reflexes normal.   Psychiatric:         Behavior: Behavior normal.         Thought Content: Thought content normal.         Judgment: Judgment normal.                    Assessment and Plan   Diagnoses and all orders for this visit:    1. Welcome to Medicare preventive visit (Primary)  -     ECG 12 Lead  Regular exercise/healthy diet. BSE q month. Sunscreen use encouraged.  Check fasting labs  Jong due 11/23  Colon ca screening - she did do colon in 12/22- 7-10 yr f/u  DT due 5/28  DEXA due 7/23-schedule  Shingles - shingrix discussed -  can check at pharmacy    She had pneumovax (smoker) in '17,  prevnar 20 today  CT lung cancer screen due now-we will schedule  She does not need paps as she had hysterectomy for benign reasons  covid-she had vaccines    2. Pure hypercholesterolemia-check levels.  Try to improve diet  -     CBC (No Diff); Future  -     TSH Rfx On Abnormal To Free T4; Future  -     Lipid Panel; Future  -     Comprehensive Metabolic Panel; Future    3. Elevated glucose-check A1c  -     Hemoglobin A1c; Future    4. Estrada's esophagus without dysplasia-stable on Aciphex, endoscopy done this year    5. Neuropathy-gabapentin refilled.  She has signed controlled substance contract.  [facility] appropriate.  -     gabapentin (NEURONTIN) 600 MG tablet; TAKE 1 TABLET BY MOUTH TWICE A DAY  Dispense: 180 tablet; Refill: 1    6. . Need for pneumococcal 20-valent conjugate vaccination  -     Pneumococcal Conjugate Vaccine 20-Valent All    7. Vitamin D deficiency-check levels  -     Vitamin D,25-Hydroxy; Future    8. Bilateral foot pain-she would like screening for rheumatoid arthritis  -     Rheumatoid Factor; Future    9. Left bundle branch block, new compared with last EKG-we will schedule stress test  -     Stress test with myocardial perfusion; Future    10. SOB (shortness of breath)-she does have asthma and is not using any inhalers.  We will send  in the nebulizer because of cost.  We will also get a stress test with a new left bundle branch block  -     Stress test with myocardial perfusion; Future    11. Asthma, mild intermittent, well-controlled  -     albuterol (ACCUNEB) 0.63 MG/3ML nebulizer solution; Take 3 mL by nebulization Every 6 (Six) Hours As Needed for Wheezing or Shortness of Air.  Dispense: 100 each; Refill: 12    12. Primary insomnia-she will try Benadryl at night    13. Personal history of tobacco use, presenting hazards to health-schedule CT scan  -     CT chest low dose wo; Future    14. Postmenopausal  -     DEXA Bone Density Axial; Future             Follow Up   Return in about 6 months (around 12/12/2023) for Next scheduled follow up.  Patient was given instructions and counseling regarding her condition or for health maintenance advice. Please see specific information pulled into the AVS if appropriate.

## 2023-06-12 NOTE — LETTER
"VACCINE CONSENT FORM      Patient Name:  Jyoti Puga    Patient :  1955      I/We have read, or have been explained, the information about the diseases and the vaccines listed below.  There was an opportunity to ask questions and any questions were answered satisfactorily.  I/We believe that I/we understand the benefits and risks of the vaccines(s) cited, and ask the vaccine(s) listed below be given to me/us or the person named above (for which i have authorized to make the request).      Vaccine(s) give:    Orders Placed This Encounter   Procedures   • Pneumococcal Conjugate Vaccine 20-Valent All         Medicare patients:    The only vaccine covered under your medical benefit is flu/pneumonia and hepatitis B.  All other may be covered under your \"Part D\" prescription plan and requires you to go to a pharmacy with a Physician orders for administration.  If you still prefer to have it administered at our office, you will receive a bill for the vaccine and administration cost.               Patient Initials                     Patient or Parent/Guardian Signature                    Date        A copy of the appropriate Centers for Disease Control and Prevention Vaccine Information Statements has been provided.   "

## 2023-08-01 ENCOUNTER — TELEPHONE (OUTPATIENT)
Dept: INTERNAL MEDICINE | Facility: CLINIC | Age: 68
End: 2023-08-01
Payer: MEDICARE

## 2023-08-07 ENCOUNTER — OFFICE VISIT (OUTPATIENT)
Dept: INTERNAL MEDICINE | Facility: CLINIC | Age: 68
End: 2023-08-07
Payer: MEDICARE

## 2023-08-07 ENCOUNTER — LAB (OUTPATIENT)
Dept: INTERNAL MEDICINE | Facility: CLINIC | Age: 68
End: 2023-08-07
Payer: MEDICARE

## 2023-08-07 VITALS
HEART RATE: 72 BPM | OXYGEN SATURATION: 93 % | SYSTOLIC BLOOD PRESSURE: 145 MMHG | BODY MASS INDEX: 35.74 KG/M2 | HEIGHT: 66 IN | WEIGHT: 222.4 LBS | TEMPERATURE: 97.1 F | DIASTOLIC BLOOD PRESSURE: 80 MMHG

## 2023-08-07 DIAGNOSIS — R42 DIZZINESS: Primary | ICD-10-CM
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
## 2023-08-07 DIAGNOSIS — R42 DIZZINESS: ICD-10-CM

## 2023-08-07 DIAGNOSIS — R07.89 CHEST TIGHTNESS: ICD-10-CM

## 2023-08-07 DIAGNOSIS — R00.2 PALPITATIONS: ICD-10-CM

## 2023-08-07 DIAGNOSIS — R55 SYNCOPE, UNSPECIFIED SYNCOPE TYPE: ICD-10-CM

## 2023-08-07 LAB
ALBUMIN SERPL-MCNC: 4 G/DL (ref 3.5–5.2)
ALBUMIN/GLOB SERPL: 2 G/DL
ALP SERPL-CCNC: 41 U/L (ref 39–117)
ALT SERPL W P-5'-P-CCNC: 11 U/L (ref 1–33)
ANION GAP SERPL CALCULATED.3IONS-SCNC: 9.6 MMOL/L (ref 5–15)
AST SERPL-CCNC: 21 U/L (ref 1–32)
BASOPHILS # BLD AUTO: 0.06 10*3/MM3 (ref 0–0.2)
BASOPHILS NFR BLD AUTO: 0.9 % (ref 0–1.5)
BILIRUB SERPL-MCNC: 0.7 MG/DL (ref 0–1.2)
BUN SERPL-MCNC: 14 MG/DL (ref 8–23)
BUN/CREAT SERPL: 23.3 (ref 7–25)
CALCIUM SPEC-SCNC: 8.3 MG/DL (ref 8.6–10.5)
CHLORIDE SERPL-SCNC: 105 MMOL/L (ref 98–107)
CO2 SERPL-SCNC: 28.4 MMOL/L (ref 22–29)
CREAT SERPL-MCNC: 0.6 MG/DL (ref 0.57–1)
DEPRECATED RDW RBC AUTO: 39.5 FL (ref 37–54)
EGFRCR SERPLBLD CKD-EPI 2021: 99.8 ML/MIN/1.73
EOSINOPHIL # BLD AUTO: 0.09 10*3/MM3 (ref 0–0.4)
EOSINOPHIL NFR BLD AUTO: 1.4 % (ref 0.3–6.2)
ERYTHROCYTE [DISTWIDTH] IN BLOOD BY AUTOMATED COUNT: 12.3 % (ref 12.3–15.4)
GLOBULIN UR ELPH-MCNC: 2 GM/DL
GLUCOSE SERPL-MCNC: 92 MG/DL (ref 65–99)
HCT VFR BLD AUTO: 41.2 % (ref 34–46.6)
HGB BLD-MCNC: 14.1 G/DL (ref 12–15.9)
IMM GRANULOCYTES # BLD AUTO: 0.02 10*3/MM3 (ref 0–0.05)
IMM GRANULOCYTES NFR BLD AUTO: 0.3 % (ref 0–0.5)
LYMPHOCYTES # BLD AUTO: 1.96 10*3/MM3 (ref 0.7–3.1)
LYMPHOCYTES NFR BLD AUTO: 29.4 % (ref 19.6–45.3)
MCH RBC QN AUTO: 30.5 PG (ref 26.6–33)
MCHC RBC AUTO-ENTMCNC: 34.2 G/DL (ref 31.5–35.7)
MCV RBC AUTO: 89 FL (ref 79–97)
MONOCYTES # BLD AUTO: 0.52 10*3/MM3 (ref 0.1–0.9)
MONOCYTES NFR BLD AUTO: 7.8 % (ref 5–12)
NEUTROPHILS NFR BLD AUTO: 4.01 10*3/MM3 (ref 1.7–7)
NEUTROPHILS NFR BLD AUTO: 60.2 % (ref 42.7–76)
NRBC BLD AUTO-RTO: 0 /100 WBC (ref 0–0.2)
PLATELET # BLD AUTO: 214 10*3/MM3 (ref 140–450)
PMV BLD AUTO: 11.3 FL (ref 6–12)
POTASSIUM SERPL-SCNC: 4.1 MMOL/L (ref 3.5–5.2)
PROT SERPL-MCNC: 6 G/DL (ref 6–8.5)
RBC # BLD AUTO: 4.63 10*6/MM3 (ref 3.77–5.28)
SODIUM SERPL-SCNC: 143 MMOL/L (ref 136–145)
WBC NRBC COR # BLD: 6.66 10*3/MM3 (ref 3.4–10.8)

## 2023-08-07 PROCEDURE — 1159F MED LIST DOCD IN RCRD: CPT | Performed by: NURSE PRACTITIONER

## 2023-08-07 PROCEDURE — 36415 COLL VENOUS BLD VENIPUNCTURE: CPT | Performed by: NURSE PRACTITIONER

## 2023-08-07 PROCEDURE — 99214 OFFICE O/P EST MOD 30 MIN: CPT | Performed by: NURSE PRACTITIONER

## 2023-08-07 PROCEDURE — 1160F RVW MEDS BY RX/DR IN RCRD: CPT | Performed by: NURSE PRACTITIONER

## 2023-08-07 PROCEDURE — 80053 COMPREHEN METABOLIC PANEL: CPT | Performed by: NURSE PRACTITIONER

## 2023-08-07 PROCEDURE — 93000 ELECTROCARDIOGRAM COMPLETE: CPT | Performed by: NURSE PRACTITIONER

## 2023-08-07 PROCEDURE — 85025 COMPLETE CBC W/AUTO DIFF WBC: CPT | Performed by: NURSE PRACTITIONER

## 2023-08-07 RX ORDER — FLUTICASONE PROPIONATE 50 MCG
2 SPRAY, SUSPENSION (ML) NASAL DAILY
Qty: 16 G | Refills: 0 | Status: SHIPPED | OUTPATIENT
Start: 2023-08-07

## 2023-08-07 RX ORDER — MECLIZINE HYDROCHLORIDE 25 MG/1
25 TABLET ORAL 3 TIMES DAILY PRN
Qty: 90 TABLET | Refills: 0 | Status: SHIPPED | OUTPATIENT
Start: 2023-08-07

## 2023-08-07 NOTE — PROGRESS NOTES
Subjective   Jyoti Puga is a 67 y.o. female    Chief Complaint   Patient presents with    Dizziness     Patient in clinic with symptoms of dizziness, nausea, vomiting and diarrhea for 5 days , symptoms have resolved and pt. Tested neg for COVID however patient is still very light headed      History of Present Illness     Pt states that 5 days ago, she tried to mow her yard and was unable to complete.  States that her mower felt very heavy and the more she tried to push, the worse she felt.  She became very dizzy.  She went in and laid down and basically stayed in bed for 3 days.  Had N/V/D.  4 days ago, she fell in her kitchen due to the dizziness.  She did not pass or lose conciousness.  She has progressively felt slightly better when the past few days, but she still feels dizzy.  Sx's are worse with position changes.  She also c/o intermittent palpitations.  She has also had some chest tightness.  Yesterday she had some right sided upper abd pain as well.      She does have a stress test scheduled in 3 days.    The following portions of the patient's history were reviewed and updated as appropriate: allergies, current medications, past family history, past medical history, past social history, past surgical history, and problem list.    Current Outpatient Medications:     acetaminophen (TYLENOL) 650 MG 8 hr tablet, Take 1 tablet by mouth Daily., Disp: , Rfl:     albuterol (ACCUNEB) 0.63 MG/3ML nebulizer solution, Take 3 mL by nebulization Every 6 (Six) Hours As Needed for Wheezing or Shortness of Air., Disp: 100 each, Rfl: 12    Cholecalciferol (vitamin D3) 125 MCG (5000 UT) capsule capsule, Take 1 capsule by mouth Every 7 (Seven) Days., Disp: , Rfl:     diclofenac (VOLTAREN) 1 % gel gel, Apply 4 g topically to the appropriate area as directed 3 (Three) Times a Day. Apply to foot, Disp: 100 g, Rfl: 11    diphenhydrAMINE (BENADRYL) 25 mg capsule, Take 1 capsule by mouth Daily., Disp: , Rfl:     fenofibrate  160 MG tablet, Take 1 tablet by mouth Daily., Disp: 90 tablet, Rfl: 1    ferrous sulfate 325 (65 FE) MG tablet, Take 1 tablet by mouth Daily With Breakfast., Disp: , Rfl:     gabapentin (NEURONTIN) 600 MG tablet, TAKE 1 TABLET BY MOUTH TWICE A DAY, Disp: 180 tablet, Rfl: 1    multivitamin with minerals tablet tablet, Take 1 tablet by mouth Daily., Disp: , Rfl:     RABEprazole (ACIPHEX) 20 MG EC tablet, Take 1 tablet by mouth 2 (Two) Times a Day., Disp: 180 tablet, Rfl: 3    fluticasone (FLONASE) 50 MCG/ACT nasal spray, 2 sprays into the nostril(s) as directed by provider Daily., Disp: 16 g, Rfl: 0    meclizine (ANTIVERT) 25 MG tablet, Take 1 tablet by mouth 3 (Three) Times a Day As Needed for Dizziness., Disp: 90 tablet, Rfl: 0     Review of Systems   Constitutional:  Negative for chills, fatigue and fever.   Respiratory:  Positive for chest tightness. Negative for cough and shortness of breath.    Cardiovascular:  Positive for chest pain and palpitations.   Gastrointestinal:  Negative for abdominal pain, diarrhea, nausea and vomiting.   Endocrine: Negative for cold intolerance and heat intolerance.   Musculoskeletal:  Negative for arthralgias.   Neurological:  Positive for dizziness and light-headedness.     Objective   Physical Exam  Constitutional:       Appearance: She is well-developed.   HENT:      Head: Normocephalic and atraumatic.      Right Ear: A middle ear effusion is present.      Left Ear: A middle ear effusion is present.   Eyes:      Conjunctiva/sclera: Conjunctivae normal.      Pupils: Pupils are equal, round, and reactive to light.   Cardiovascular:      Rate and Rhythm: Normal rate and regular rhythm.      Heart sounds: Normal heart sounds.   Pulmonary:      Effort: Pulmonary effort is normal.      Breath sounds: Normal breath sounds.   Abdominal:      General: Bowel sounds are normal.      Palpations: Abdomen is soft.   Musculoskeletal:         General: Normal range of motion.      Cervical back:  "Normal range of motion.   Skin:     General: Skin is warm and dry.   Neurological:      Mental Status: She is alert and oriented to person, place, and time.      Deep Tendon Reflexes: Reflexes are normal and symmetric.   Psychiatric:         Behavior: Behavior normal.         Thought Content: Thought content normal.         Judgment: Judgment normal.     Vitals:    08/07/23 0921   BP: 145/80   Pulse: 72   Temp: 97.1 øF (36.2 øC)   SpO2: 93%   Weight: 101 kg (222 lb 6.4 oz)   Height: 166.7 cm (65.63\")     Vitals:    08/07/23 1012 08/07/23 1013 08/07/23 1014   Orthostatic BP: 140/80 138/70 135/70   Orthostatic Pulse:  70 70   Patient Position: Sitting Sitting Lying       ECG 12 Lead    Date/Time: 8/7/2023 10:28 AM  Performed by: Eloisa Collins APRN  Authorized by: Eloisa Collins APRN   Comparison: compared with previous ECG   Similar to previous ECG  Rhythm: sinus rhythm  Rate: normal  Conduction: left bundle branch block  T Waves: T waves normal  Other: no other findings    Clinical impression: abnormal EKG        Assessment & Plan   Diagnoses and all orders for this visit:    1. Dizziness (Primary)  -     ECG 12 Lead  -     Adult Transthoracic Echo Complete W/ Cont if Necessary Per Protocol; Future  -     Holter Monitor - 72 Hour Up To 15 Days; Future  -     Duplex Carotid Ultrasound CAR; Future  -     MRI Brain With & Without Contrast; Future  -     Comprehensive Metabolic Panel; Future  -     CBC & Differential; Future  -     fluticasone (FLONASE) 50 MCG/ACT nasal spray; 2 sprays into the nostril(s) as directed by provider Daily.  Dispense: 16 g; Refill: 0  -     meclizine (ANTIVERT) 25 MG tablet; Take 1 tablet by mouth 3 (Three) Times a Day As Needed for Dizziness.  Dispense: 90 tablet; Refill: 0    2. Chest tightness  -     ECG 12 Lead  -     Adult Transthoracic Echo Complete W/ Cont if Necessary Per Protocol; Future  -     Holter Monitor - 72 Hour Up To 15 Days; Future  -     Duplex Carotid " Ultrasound CAR; Future  -     Comprehensive Metabolic Panel; Future  -     CBC & Differential; Future    3. Palpitations  -     ECG 12 Lead  -     Adult Transthoracic Echo Complete W/ Cont if Necessary Per Protocol; Future  -     Holter Monitor - 72 Hour Up To 15 Days; Future  -     Duplex Carotid Ultrasound CAR; Future  -     Comprehensive Metabolic Panel; Future  -     CBC & Differential; Future    4. Fall, initial encounter  -     MRI Brain With & Without Contrast; Future  -     Comprehensive Metabolic Panel; Future  -     CBC & Differential; Future    5. Syncope, unspecified syncope type  -     MRI Brain With & Without Contrast; Future  -     Comprehensive Metabolic Panel; Future  -     CBC & Differential; Future      Orthostatics WNL  EKG unchanged from 6/2023, keep appt for stress test as scheduled  Labs sent  Will ck an echo, holter, carotid duplex and MRI of the brain  Flonase as directed  Meclizine PRN  To the ER with any recurrent sx's.  Pt VU  Return for Next scheduled follow up.

## 2023-08-10 ENCOUNTER — HOSPITAL ENCOUNTER (OUTPATIENT)
Dept: CARDIOLOGY | Facility: HOSPITAL | Age: 68
Discharge: HOME | End: 2023-08-10
Payer: MEDICARE

## 2023-08-10 VITALS
BODY MASS INDEX: 35.79 KG/M2 | SYSTOLIC BLOOD PRESSURE: 144 MMHG | WEIGHT: 222.66 LBS | OXYGEN SATURATION: 100 % | HEART RATE: 74 BPM | DIASTOLIC BLOOD PRESSURE: 86 MMHG | HEIGHT: 66 IN

## 2023-08-10 DIAGNOSIS — R06.02 SOB (SHORTNESS OF BREATH): ICD-10-CM

## 2023-08-10 DIAGNOSIS — I44.7 LEFT BUNDLE BRANCH BLOCK: ICD-10-CM

## 2023-08-10 LAB
BH CV REST NUCLEAR ISOTOPE DOSE: 9.6 MCI
BH CV STRESS BP STAGE 2: NORMAL
BH CV STRESS BP STAGE 4: NORMAL
BH CV STRESS COMMENTS STAGE 1: NORMAL
BH CV STRESS DOSE REGADENOSON STAGE 1: 0.4
BH CV STRESS DURATION MIN STAGE 1: 1
BH CV STRESS DURATION MIN STAGE 2: 1
BH CV STRESS DURATION MIN STAGE 3: 1
BH CV STRESS DURATION MIN STAGE 4: 1
BH CV STRESS DURATION SEC STAGE 1: 10
BH CV STRESS DURATION SEC STAGE 2: 0
BH CV STRESS HR STAGE 1: 82
BH CV STRESS HR STAGE 2: 86
BH CV STRESS HR STAGE 3: 83
BH CV STRESS HR STAGE 4: 86
BH CV STRESS NUCLEAR ISOTOPE DOSE: 33 MCI
BH CV STRESS O2 STAGE 1: 99
BH CV STRESS O2 STAGE 2: 99
BH CV STRESS O2 STAGE 3: 100
BH CV STRESS O2 STAGE 4: 100
BH CV STRESS PROTOCOL 1: NORMAL
BH CV STRESS RECOVERY BP: NORMAL MMHG
BH CV STRESS RECOVERY HR: 77 BPM
BH CV STRESS RECOVERY O2: 98 %
BH CV STRESS STAGE 1: 1
BH CV STRESS STAGE 2: 2
BH CV STRESS STAGE 3: 3
BH CV STRESS STAGE 4: 4
LV EF NUC BP: 55 %
MAXIMAL PREDICTED HEART RATE: 155 BPM
PERCENT MAX PREDICTED HR: 59.35 %
STRESS BASELINE BP: NORMAL MMHG
STRESS BASELINE HR: 72 BPM
STRESS O2 SAT REST: 99 %
STRESS PERCENT HR: 70 %
STRESS POST ESTIMATED WORKLOAD: 1 METS
STRESS POST EXERCISE DUR MIN: 4 MIN
STRESS POST EXERCISE DUR SEC: 0 SEC
STRESS POST O2 SAT PEAK: 99 %
STRESS POST PEAK BP: NORMAL MMHG
STRESS POST PEAK HR: 92 BPM
STRESS TARGET HR: 132 BPM

## 2023-08-10 PROCEDURE — 93017 CV STRESS TEST TRACING ONLY: CPT

## 2023-08-10 PROCEDURE — 25010000002 REGADENOSON 0.4 MG/5ML SOLUTION: Performed by: INTERNAL MEDICINE

## 2023-08-10 PROCEDURE — 0 TECHNETIUM SESTAMIBI: Performed by: INTERNAL MEDICINE

## 2023-08-10 PROCEDURE — A9500 TC99M SESTAMIBI: HCPCS | Performed by: INTERNAL MEDICINE

## 2023-08-10 PROCEDURE — 78452 HT MUSCLE IMAGE SPECT MULT: CPT

## 2023-08-10 RX ORDER — REGADENOSON 0.08 MG/ML
0.4 INJECTION, SOLUTION INTRAVENOUS ONCE
Status: COMPLETED | OUTPATIENT
Start: 2023-08-10 | End: 2023-08-10

## 2023-08-10 RX ADMIN — TECHNETIUM TC 99M SESTAMIBI 1 DOSE: 1 INJECTION INTRAVENOUS at 13:00

## 2023-08-10 RX ADMIN — TECHNETIUM TC 99M SESTAMIBI 1 DOSE: 1 INJECTION INTRAVENOUS at 11:30

## 2023-08-10 RX ADMIN — REGADENOSON 0.4 MG: 0.08 INJECTION, SOLUTION INTRAVENOUS at 12:57

## 2023-08-16 ENCOUNTER — HOSPITAL ENCOUNTER (OUTPATIENT)
Dept: BONE DENSITY | Facility: HOSPITAL | Age: 68
Discharge: HOME | End: 2023-08-16
Admitting: INTERNAL MEDICINE
Payer: MEDICARE

## 2023-08-16 ENCOUNTER — OFFICE VISIT (OUTPATIENT)
Dept: INTERNAL MEDICINE | Facility: CLINIC | Age: 68
End: 2023-08-16
Payer: MEDICARE

## 2023-08-16 VITALS
OXYGEN SATURATION: 100 % | WEIGHT: 222 LBS | SYSTOLIC BLOOD PRESSURE: 132 MMHG | BODY MASS INDEX: 35.68 KG/M2 | HEART RATE: 86 BPM | HEIGHT: 66 IN | TEMPERATURE: 97.1 F | DIASTOLIC BLOOD PRESSURE: 80 MMHG

## 2023-08-16 DIAGNOSIS — R42 DIZZINESS: ICD-10-CM

## 2023-08-16 DIAGNOSIS — Z78.0 POSTMENOPAUSAL: ICD-10-CM

## 2023-08-16 DIAGNOSIS — R10.32 LLQ PAIN: ICD-10-CM

## 2023-08-16 DIAGNOSIS — I44.7 LEFT BUNDLE BRANCH BLOCK: Primary | ICD-10-CM

## 2023-08-16 DIAGNOSIS — R06.02 SOB (SHORTNESS OF BREATH): ICD-10-CM

## 2023-08-16 PROBLEM — J06.9 UPPER RESPIRATORY TRACT INFECTION: Status: RESOLVED | Noted: 2022-11-08 | Resolved: 2023-08-16

## 2023-08-16 PROCEDURE — 1160F RVW MEDS BY RX/DR IN RCRD: CPT | Performed by: INTERNAL MEDICINE

## 2023-08-16 PROCEDURE — 77080 DXA BONE DENSITY AXIAL: CPT

## 2023-08-16 PROCEDURE — 99214 OFFICE O/P EST MOD 30 MIN: CPT | Performed by: INTERNAL MEDICINE

## 2023-08-16 PROCEDURE — 1159F MED LIST DOCD IN RCRD: CPT | Performed by: INTERNAL MEDICINE

## 2023-08-16 NOTE — PROGRESS NOTES
Answers submitted by the patient for this visit:  Other (Submitted on 8/10/2023)  Please describe your symptoms.: Follo up for stress test  Have you had these symptoms before?: No  How long have you been having these symptoms?: 5-7 days  Please describe any probable cause for these symptoms. : Office Visit for follow up gabriela walters  Primary Reason for Visit (Submitted on 8/10/2023)  What is the primary reason for your visit?: Other  Chief Complaint  Results (Go over the results of her stress test.)    Subjective          Jyoti Puga presents to [facility]  History of Present Illness    Here for follow-up on her wellness exam in June where her EKG showed a new left bundle branch block and she was having more shortness of breath.  We scheduled her for a stress test which was nondiagnostic because of attenuation defect.  She did have shortness of breath during the test.    Shortness of breath, history of asthma-we did send in a nebulizer at last visit, with both albuterol and budesonide.  She did try the albuterol nebulizer but it made her heart race and she has not tried the budesonide either.  She does continue to have shortness of breath when she tries to do something but none at rest    Dizziness/syncopal episode-this occurred while she was mowing the lawn, felt very weak like she could push the mower and also had dizziness.  She had to go inside and lay down.  She ended up staying in bed for about 3 days and did have diarrhea and vomiting during this episode.  She did not have chest pain.  She did check her sugar and it was 133.  She saw Eloisa here on August 7 and Eloisa ordered an echo, Holter, carotids, and MRI head which are all scheduled.  She had a CBC and metabolic panel which were essentially normal except for a low calcium.  Patient feels much better-diarrhea and vomiting have resolved.  Still feels slightly dizzy at times but not as bad as before.  She is still trying  to eat lightly    Hypercalcemia-in June her calcium level was high at 11.1 but when Eloisa rechecked it 8/7 it was below normal at 8.3 ( albumin lower as well at 4.0)    Left lower quadrant pain-does feel some discomfort in the left lower quadrant intermittently over the last year.  She did have colonoscopy last year which showed diverticulosis but otherwise was normal.    Current Outpatient Medications:     acetaminophen (TYLENOL) 650 MG 8 hr tablet, Take 1 tablet by mouth Daily., Disp: , Rfl:     albuterol (ACCUNEB) 0.63 MG/3ML nebulizer solution, Take 3 mL by nebulization Every 6 (Six) Hours As Needed for Wheezing or Shortness of Air., Disp: 100 each, Rfl: 12    Cholecalciferol (vitamin D3) 125 MCG (5000 UT) capsule capsule, Take 1 capsule by mouth Every 7 (Seven) Days., Disp: , Rfl:     diclofenac (VOLTAREN) 1 % gel gel, Apply 4 g topically to the appropriate area as directed 3 (Three) Times a Day. Apply to foot, Disp: 100 g, Rfl: 11    diphenhydrAMINE (BENADRYL) 25 mg capsule, Take 1 capsule by mouth Daily., Disp: , Rfl:     fenofibrate 160 MG tablet, Take 1 tablet by mouth Daily., Disp: 90 tablet, Rfl: 1    ferrous sulfate 325 (65 FE) MG tablet, Take 1 tablet by mouth Daily With Breakfast., Disp: , Rfl:     fluticasone (FLONASE) 50 MCG/ACT nasal spray, 2 sprays into the nostril(s) as directed by provider Daily., Disp: 16 g, Rfl: 0    gabapentin (NEURONTIN) 600 MG tablet, TAKE 1 TABLET BY MOUTH TWICE A DAY, Disp: 180 tablet, Rfl: 1    meclizine (ANTIVERT) 25 MG tablet, Take 1 tablet by mouth 3 (Three) Times a Day As Needed for Dizziness., Disp: 90 tablet, Rfl: 0    multivitamin with minerals tablet tablet, Take 1 tablet by mouth Daily., Disp: , Rfl:     RABEprazole (ACIPHEX) 20 MG EC tablet, Take 1 tablet by mouth 2 (Two) Times a Day., Disp: 180 tablet, Rfl: 3   The following portions of the patient's history were reviewed and updated as appropriate: allergies, current medications, past family history, past  "medical history, past social history, past surgical history and problem list.     Objective   Vital Signs:   /80 (BP Location: Left arm, Patient Position: Sitting)   Pulse 86   Temp 97.1 øF (36.2 øC) (Infrared)   Ht 166.7 cm (65.62\")   Wt 101 kg (222 lb)   SpO2 100%   BMI 36.25 kg/mý       Physical exam  Constitutional: oriented to person, place, and time.  well-developed and well-nourished. No distress.   HENT:   Head: Normocephalic and atraumatic.   Eyes: Conjunctivae and EOM are normal.   Cardiovascular: Normal rate, regular rhythm and normal heart sounds.  Exam reveals no gallop and no friction rub.    No murmur heard.  Pulmonary/Chest: Effort normal and breath sounds normal. No respiratory distress.   no wheezes.   Neurological:  alert and oriented to person, place, and time.   Skin: Skin is warm and dry. not diaphoretic.   Abd: soft, nondistended, +LLQ tenderness  Psychiatric:  normal mood and affect. behavior is normal. Judgment and thought content normal.      Result Review :   The following data was reviewed by: Waleska Miller MD on 08/16/2023:  Common labs          10/26/2022    08:59 6/12/2023    10:03 8/7/2023    10:45   Common Labs   Glucose 119  105  92    BUN 14  14  14    Creatinine 0.66  0.65  0.60    Sodium 141  141  143    Potassium 4.0  4.7  4.1    Chloride 108  100  105    Calcium 9.6  11.1  8.3    Albumin 3.90  4.7  4.0    Total Bilirubin 0.4  0.6  0.7    Alkaline Phosphatase 36  43  41    AST (SGOT) 23  27  21    ALT (SGPT) 11  20  11    WBC 5.11  6.39  6.66    Hemoglobin 9.7  14.5  14.1    Hematocrit 29.6  43.3  41.2    Platelets 252  235  214    Total Cholesterol  190     Triglycerides  111     HDL Cholesterol  69     LDL Cholesterol   101     Hemoglobin A1C  5.80                   Assessment and Plan    Diagnoses and all orders for this visit:    1. Left bundle branch block (Primary)-her stress test was nondiagnostic.  We will go ahead and refer her to cardiology.  She also " does have a Holter and echo and carotid duplex scheduled for episode of dizziness and weakness  -     Ambulatory Referral to Cardiology    2. Dizziness-see above.  We will have her return in October after she has all the testing and plan to recheck blood work and see how she is feeling    3. SOB (shortness of breath)-discussed that she could go ahead and try the budesonide through the nebulizer as this will make her heart race.  -     Ambulatory Referral to Cardiology    4.  Left lower quadrant pain-she will continue following a light diet and if this does not improve, she will let me know and we can get a CT scan to make sure it is not diverticulitis    Follow Up   Return in about 7 weeks (around 10/4/2023).  Patient was given instructions and counseling regarding her condition or for health maintenance advice. Please see specific information pulled into the AVS if appropriate.

## 2023-08-24 NOTE — PROGRESS NOTES
Chief Complaint  Dizziness, Chest Pain, and Palpitations    Subjective      History of Present Illness {CC  Problem List  Visit  Diagnosis   Encounters  Notes  Medications  Labs  Result Review Imaging  Media :23}     Jyoti Puga, 67 y.o. female with past medical history significant for hyperlipidemia, GERD, Estrada's esophagus, diverticulitis, depression, and asthma, who presents to [facility] for Dizziness, Chest Pain, and Palpitations  Patient presented to primary care on 6/12/2023, at which time twelve-lead EKG showed sinus rhythm with left bundle branch block, rate 65.  This was a new finding of a left bundle branch block.  Patient then underwent stress test on 8/10/2023 which revealed nondiagnostic pharmacologic nuclear stress test due to GI attenuation artifact, LVEF is normal at 55%, coronary artery calcification noted on CT attenuation correction images.  Patient was referred to cardiology for evaluation of abnormal stress test and evaluation of left bundle branch block which was a new finding.    Since time of evaluation by primary care, she denies any further chest pain/pressure. She endorses shortness of air, which is normally present but may be worse recently. Patient endorses palpitations mostly while at rest. Denies syncope, and lower extremity edema.  She does endorse fatigue. Has had one episode of dizziness associated with diarrhea and vomiting.  Does not endorse normal dizziness with position changes.  She states she is not very active usually. She is checking her blood pressure at home which is running 150/70.    Caffeine intake of 1 cup coffee daily  States hydration could use improvement at 2-3 bottles of water daily      Mother with atrial fibrillation in her 60's  Brother with atrial fibrillation in 60's    Objective     Vital Signs:   Vitals:    08/25/23 0847 08/25/23 0848 08/25/23 0849 08/25/23 0915   BP: (!) 199/91 (!) 202/89 172/81 150/78   BP  "Location: Right arm Left arm Left arm Left arm   Patient Position: Sitting Standing Sitting    Cuff Size: Adult Adult Adult    Pulse: 77 75 71    Resp:   18    Temp:  97.2 øF (36.2 øC) 97.2 øF (36.2 øC)    TempSrc:  Temporal Temporal    SpO2: 95% 95% 96%    Weight:   102 kg (225 lb)    Height:   167.6 cm (66\")      Body mass index is 36.32 kg/mý.  Physical Exam  Vitals and nursing note reviewed.   Constitutional:       Appearance: Normal appearance.   HENT:      Head: Normocephalic.   Eyes:      Pupils: Pupils are equal, round, and reactive to light.   Cardiovascular:      Rate and Rhythm: Normal rate and regular rhythm.      Pulses: Normal pulses.      Heart sounds: Normal heart sounds. No murmur heard.  Pulmonary:      Effort: Pulmonary effort is normal.      Breath sounds: Normal breath sounds.   Abdominal:      General: Bowel sounds are normal.      Palpations: Abdomen is soft.   Musculoskeletal:         General: Normal range of motion.      Cervical back: Normal range of motion.      Right lower leg: No edema.      Left lower leg: No edema.   Skin:     General: Skin is warm and dry.      Capillary Refill: Capillary refill takes less than 2 seconds.   Neurological:      Mental Status: She is alert and oriented to person, place, and time.   Psychiatric:         Mood and Affect: Mood normal.         Thought Content: Thought content normal.         Data Reviewed:{ Labs  Result Review  Imaging  Med Tab  Media :23}   Stress test with myocardial perfusion (08/10/2023 12:54)  ECG 12 Lead (06/12/2023 09:23)  Lab Results   Component Value Date    WBC 6.66 08/07/2023    HGB 14.1 08/07/2023    HCT 41.2 08/07/2023    MCV 89.0 08/07/2023     08/07/2023      Lab Results   Component Value Date    GLUCOSE 92 08/07/2023    BUN 14 08/07/2023    CREATININE 0.60 08/07/2023    EGFR 99.8 08/07/2023    BCR 23.3 08/07/2023    K 4.1 08/07/2023    CO2 28.4 08/07/2023    CALCIUM 8.3 (L) 08/07/2023    PROTENTOTREF 6.9 12/19/2018 "    ALBUMIN 4.0 08/07/2023    BILITOT 0.7 08/07/2023    AST 21 08/07/2023    ALT 11 08/07/2023      CT Chest Low Dose Cancer Screening WO (06/22/2023 13:22)     Assessment & Plan   Assessment and Plan {CC Problem List  Visit Diagnosis  ROS  Review (Popup)  Health Maintenance  Quality  BestPractice  Medications  SmartSets  SnapShot Encounters  Media :23}     1. Left bundle branch block  -Patient with new finding of left bundle branch block on EKG  -Stress test been done by primary care. Stress test on 8/10/2023 which revealed nondiagnostic pharmacologic nuclear stress test due to GI attenuation artifact, LVEF is normal at 55%, coronary artery calcification noted on CT attenuation correction images  -We will obtain 2-week Holter monitor to assess further arrhythmias  - CT Angiogram Coronary; Future  - Ambulatory Referral to Cardiology  -Patient has scheduled appointment next month for echocardiogram.  Encouraged patient to keep this appointment as I agree with this plan.  -We will refer patient to cardiology.  If patient is able to be seen by cardiology within 4 weeks she may cancel appointment at heart valve.    2. Pure hypercholesterolemia  -Patient currently only on fenofibrate  -Further adjustments pending results of coronary CTA    3. Dizziness  -Patient denies further dizziness after 1 severe episode related to nausea, vomiting, and diarrhea  -She does admit that her hydration could be better.  She was encouraged to drink 2 quarts of water daily  - Holter Monitor - 72 Hour Up To 15 Days  - CT Angiogram Coronary; Future  - Holter Monitor - 72 Hour Up To 15 Days; Future    4. Chest tightness  -Stress test on 8/10/2023 which revealed nondiagnostic pharmacologic nuclear stress test due to GI attenuation artifact, LVEF is normal at 55%, coronary artery calcification noted on CT attenuation correction images  - Holter Monitor - 72 Hour Up To 15 Days  - CT Angiogram Coronary; Future  - Holter Monitor - 72  Hour Up To 15 Days; Future  - Ambulatory Referral to Cardiology  -Patient has scheduled appointment next month for echocardiogram.  Encouraged patient to keep this appointment as I agree with this plan.    5. Palpitations  -We will obtain 14-day Holter monitor to assess for arrhythmias  - Ambulatory Referral to Cardiology  -Patient has scheduled appointment next month for echocardiogram.  Encouraged patient to keep this appointment as I agree with this plan.      6.  Hypertension  -Patient with ongoing hypertension.  She was noted to be hypertensive both in office and reports elevated blood pressure readings while at home as well.  -We will start patient on amlodipine 5 mg daily with goal blood pressure of less than 140/90  -Encouraged patient to continue ambulatory blood pressure monitoring.  She was instructed to notify clinic of ongoing hyper hypotension.  Patient encouraged to call if blood pressure greater than 140 systolic or less than 110 systolic  -Patient has scheduled appointment next month for echocardiogram.  Encouraged patient to keep this appointment as I agree with this plan.          Follow Up {Instructions Charge Capture  Follow-up Communications :23}     Return in about 4 weeks (around 9/22/2023) for Palpitations.      Patient was given instructions and counseling regarding her condition or for health maintenance advice. Please see specific information pulled into the AVS if appropriate.  Patient was instructed to call the [facility] with any questions, concerns, or worsening symptoms.    Dictated Utilizing Dragon Dictation   Please note that portions of this note were completed with a voice recognition program.   Part of this note may be an electronic transcription/translation of spoken language to printed text using the Dragon Dictation System.

## 2023-08-25 ENCOUNTER — OFFICE VISIT (OUTPATIENT)
Dept: CARDIOLOGY | Facility: HOSPITAL | Age: 68
End: 2023-08-25
Payer: MEDICARE

## 2023-08-25 ENCOUNTER — HOSPITAL ENCOUNTER (OUTPATIENT)
Dept: CARDIOLOGY | Facility: HOSPITAL | Age: 68
Discharge: HOME | End: 2023-08-25
Payer: MEDICARE

## 2023-08-25 VITALS
SYSTOLIC BLOOD PRESSURE: 150 MMHG | RESPIRATION RATE: 18 BRPM | HEIGHT: 66 IN | OXYGEN SATURATION: 96 % | WEIGHT: 225 LBS | DIASTOLIC BLOOD PRESSURE: 78 MMHG | HEART RATE: 71 BPM | TEMPERATURE: 97.2 F | BODY MASS INDEX: 36.16 KG/M2

## 2023-08-25 DIAGNOSIS — R00.2 PALPITATIONS: ICD-10-CM

## 2023-08-25 DIAGNOSIS — E78.00 PURE HYPERCHOLESTEROLEMIA: Chronic | ICD-10-CM

## 2023-08-25 DIAGNOSIS — R42 DIZZINESS: ICD-10-CM

## 2023-08-25 DIAGNOSIS — I44.7 LEFT BUNDLE BRANCH BLOCK: ICD-10-CM

## 2023-08-25 DIAGNOSIS — I44.7 LEFT BUNDLE BRANCH BLOCK: Primary | ICD-10-CM

## 2023-08-25 DIAGNOSIS — R07.89 CHEST TIGHTNESS: ICD-10-CM

## 2023-08-25 DIAGNOSIS — I10 PRIMARY HYPERTENSION: ICD-10-CM

## 2023-08-25 PROCEDURE — 93242 EXT ECG>48HR<7D RECORDING: CPT

## 2023-08-25 RX ORDER — BUDESONIDE 0.5 MG/2ML
0.5 INHALANT ORAL NIGHTLY
COMMUNITY

## 2023-08-25 RX ORDER — AMLODIPINE BESYLATE 5 MG/1
5 TABLET ORAL DAILY
Qty: 30 TABLET | Refills: 1 | Status: SHIPPED | OUTPATIENT
Start: 2023-08-25

## 2023-08-25 NOTE — PATIENT INSTRUCTIONS
Keep checking blood pressure at home. Goal blood pressure is less than 140/90.     IF you notice within the next week that blood pressure is NOT under 140 or is less than 100 on the top call clinic.     Keep echo appt

## 2023-08-27 ENCOUNTER — HOSPITAL ENCOUNTER (EMERGENCY)
Facility: HOSPITAL | Age: 68
Discharge: HOME | End: 2023-08-27
Attending: EMERGENCY MEDICINE | Admitting: EMERGENCY MEDICINE
Payer: MEDICARE

## 2023-08-27 ENCOUNTER — APPOINTMENT (OUTPATIENT)
Dept: GENERAL RADIOLOGY | Facility: HOSPITAL | Age: 68
End: 2023-08-27
Payer: MEDICARE

## 2023-08-27 ENCOUNTER — APPOINTMENT (OUTPATIENT)
Dept: MRI IMAGING | Facility: HOSPITAL | Age: 68
End: 2023-08-27
Payer: MEDICARE

## 2023-08-27 VITALS
TEMPERATURE: 98.4 F | HEART RATE: 79 BPM | OXYGEN SATURATION: 98 % | WEIGHT: 225 LBS | HEIGHT: 66 IN | RESPIRATION RATE: 18 BRPM | BODY MASS INDEX: 36.16 KG/M2 | DIASTOLIC BLOOD PRESSURE: 107 MMHG | SYSTOLIC BLOOD PRESSURE: 164 MMHG

## 2023-08-27 DIAGNOSIS — R42 DIZZINESS: Primary | ICD-10-CM

## 2023-08-27 LAB
ALBUMIN SERPL-MCNC: 4.5 G/DL (ref 3.5–5.2)
ALBUMIN/GLOB SERPL: 1.6 G/DL
ALP SERPL-CCNC: 43 U/L (ref 39–117)
ALT SERPL W P-5'-P-CCNC: 13 U/L (ref 1–33)
ANION GAP SERPL CALCULATED.3IONS-SCNC: 13 MMOL/L (ref 5–15)
AST SERPL-CCNC: 21 U/L (ref 1–32)
B PARAPERT DNA SPEC QL NAA+PROBE: NOT DETECTED
B PERT DNA SPEC QL NAA+PROBE: NOT DETECTED
BASOPHILS # BLD AUTO: 0.04 10*3/MM3 (ref 0–0.2)
BASOPHILS NFR BLD AUTO: 0.5 % (ref 0–1.5)
BILIRUB SERPL-MCNC: 0.8 MG/DL (ref 0–1.2)
BILIRUB UR QL STRIP: NEGATIVE
BUN SERPL-MCNC: 14 MG/DL (ref 8–23)
BUN/CREAT SERPL: 24.6 (ref 7–25)
C PNEUM DNA NPH QL NAA+NON-PROBE: NOT DETECTED
CALCIUM SPEC-SCNC: 10.4 MG/DL (ref 8.6–10.5)
CHLORIDE SERPL-SCNC: 100 MMOL/L (ref 98–107)
CLARITY UR: CLEAR
CO2 SERPL-SCNC: 28 MMOL/L (ref 22–29)
COLOR UR: YELLOW
CREAT SERPL-MCNC: 0.57 MG/DL (ref 0.57–1)
DEPRECATED RDW RBC AUTO: 42 FL (ref 37–54)
EGFRCR SERPLBLD CKD-EPI 2021: 101 ML/MIN/1.73
EOSINOPHIL # BLD AUTO: 0.06 10*3/MM3 (ref 0–0.4)
EOSINOPHIL NFR BLD AUTO: 0.8 % (ref 0.3–6.2)
ERYTHROCYTE [DISTWIDTH] IN BLOOD BY AUTOMATED COUNT: 12.7 % (ref 12.3–15.4)
FLUAV SUBTYP SPEC NAA+PROBE: NOT DETECTED
FLUBV RNA ISLT QL NAA+PROBE: NOT DETECTED
GLOBULIN UR ELPH-MCNC: 2.8 GM/DL
GLUCOSE SERPL-MCNC: 120 MG/DL (ref 65–99)
GLUCOSE UR STRIP-MCNC: NEGATIVE MG/DL
HADV DNA SPEC NAA+PROBE: NOT DETECTED
HCOV 229E RNA SPEC QL NAA+PROBE: NOT DETECTED
HCOV HKU1 RNA SPEC QL NAA+PROBE: NOT DETECTED
HCOV NL63 RNA SPEC QL NAA+PROBE: NOT DETECTED
HCOV OC43 RNA SPEC QL NAA+PROBE: NOT DETECTED
HCT VFR BLD AUTO: 47 % (ref 34–46.6)
HGB BLD-MCNC: 15.7 G/DL (ref 12–15.9)
HGB UR QL STRIP.AUTO: NEGATIVE
HMPV RNA NPH QL NAA+NON-PROBE: NOT DETECTED
HPIV1 RNA ISLT QL NAA+PROBE: NOT DETECTED
HPIV2 RNA SPEC QL NAA+PROBE: NOT DETECTED
HPIV3 RNA NPH QL NAA+PROBE: NOT DETECTED
HPIV4 P GENE NPH QL NAA+PROBE: NOT DETECTED
IMM GRANULOCYTES # BLD AUTO: 0.02 10*3/MM3 (ref 0–0.05)
IMM GRANULOCYTES NFR BLD AUTO: 0.3 % (ref 0–0.5)
KETONES UR QL STRIP: NEGATIVE
LEUKOCYTE ESTERASE UR QL STRIP.AUTO: NEGATIVE
LYMPHOCYTES # BLD AUTO: 1.28 10*3/MM3 (ref 0.7–3.1)
LYMPHOCYTES NFR BLD AUTO: 16.1 % (ref 19.6–45.3)
M PNEUMO IGG SER IA-ACNC: NOT DETECTED
MCH RBC QN AUTO: 30.4 PG (ref 26.6–33)
MCHC RBC AUTO-ENTMCNC: 33.4 G/DL (ref 31.5–35.7)
MCV RBC AUTO: 91.1 FL (ref 79–97)
MONOCYTES # BLD AUTO: 0.42 10*3/MM3 (ref 0.1–0.9)
MONOCYTES NFR BLD AUTO: 5.3 % (ref 5–12)
NEUTROPHILS NFR BLD AUTO: 6.15 10*3/MM3 (ref 1.7–7)
NEUTROPHILS NFR BLD AUTO: 77 % (ref 42.7–76)
NITRITE UR QL STRIP: NEGATIVE
NRBC BLD AUTO-RTO: 0 /100 WBC (ref 0–0.2)
PH UR STRIP.AUTO: 8 [PH] (ref 5–8)
PLATELET # BLD AUTO: 257 10*3/MM3 (ref 140–450)
PMV BLD AUTO: 11.1 FL (ref 6–12)
POTASSIUM SERPL-SCNC: 4 MMOL/L (ref 3.5–5.2)
PROT SERPL-MCNC: 7.3 G/DL (ref 6–8.5)
PROT UR QL STRIP: NEGATIVE
RBC # BLD AUTO: 5.16 10*6/MM3 (ref 3.77–5.28)
RHINOVIRUS RNA SPEC NAA+PROBE: NOT DETECTED
RSV RNA NPH QL NAA+NON-PROBE: NOT DETECTED
SARS-COV-2 RNA NPH QL NAA+NON-PROBE: NOT DETECTED
SODIUM SERPL-SCNC: 141 MMOL/L (ref 136–145)
SP GR UR STRIP: 1.02 (ref 1–1.03)
TROPONIN T SERPL HS-MCNC: 9 NG/L
UROBILINOGEN UR QL STRIP: NORMAL
WBC NRBC COR # BLD: 7.97 10*3/MM3 (ref 3.4–10.8)

## 2023-08-27 PROCEDURE — 80053 COMPREHEN METABOLIC PANEL: CPT | Performed by: EMERGENCY MEDICINE

## 2023-08-27 PROCEDURE — 71045 X-RAY EXAM CHEST 1 VIEW: CPT

## 2023-08-27 PROCEDURE — 70551 MRI BRAIN STEM W/O DYE: CPT

## 2023-08-27 PROCEDURE — 81003 URINALYSIS AUTO W/O SCOPE: CPT | Performed by: EMERGENCY MEDICINE

## 2023-08-27 PROCEDURE — 84484 ASSAY OF TROPONIN QUANT: CPT | Performed by: EMERGENCY MEDICINE

## 2023-08-27 PROCEDURE — 0202U NFCT DS 22 TRGT SARS-COV-2: CPT | Performed by: EMERGENCY MEDICINE

## 2023-08-27 PROCEDURE — 99284 EMERGENCY DEPT VISIT MOD MDM: CPT

## 2023-08-27 PROCEDURE — 85025 COMPLETE CBC W/AUTO DIFF WBC: CPT | Performed by: EMERGENCY MEDICINE

## 2023-08-27 RX ORDER — SODIUM CHLORIDE 0.9 % (FLUSH) 0.9 %
10 SYRINGE (ML) INJECTION AS NEEDED
Status: DISCONTINUED | OUTPATIENT
Start: 2023-08-27 | End: 2023-08-27 | Stop reason: HOSPADM

## 2023-08-27 NOTE — ED PROVIDER NOTES
Subjective   History of Present Illness  67-year-old female who presents for evaluation of elevated blood pressure.  The patient reports that roughly 2 weeks ago she was mowing and she felt as if the mower was extremely heavy and she was almost too weak to push it.  She also reports feeling dizzy and lightheaded at that given time.  She ultimately went in and rested.  The symptoms did not completely resolve the next persisted for roughly 3 days before they improved.  She still seemed to be low but ataxic/uneasy on her feet.  She actually followed up with her primary care physician 3 days after the episode occurred and they told her that she should go to the ER at that given time.  She was initiated on Norvasc for elevated blood pressure because of elevated blood pressure at that given time.  She continues to have elevated blood pressure and feel fatigued in general and has now presented to the ER.  She denies acute chest pain, abdominal pain, or change in bowel or urinary function.  No sick contacts.  No fever or infectious symptoms.  No other new medications.  No other acute complaints.    Review of Systems   Constitutional:  Positive for fatigue. Negative for chills and fever.   HENT:  Negative for congestion, ear pain, postnasal drip, sinus pressure and sore throat.    Eyes:  Negative for pain, redness and visual disturbance.   Respiratory:  Negative for cough, chest tightness and shortness of breath.    Cardiovascular:  Negative for chest pain, palpitations and leg swelling.   Gastrointestinal:  Negative for abdominal pain, anal bleeding, blood in stool, diarrhea, nausea and vomiting.   Endocrine: Negative for polydipsia and polyuria.   Genitourinary:  Negative for difficulty urinating, dysuria, frequency and urgency.   Musculoskeletal:  Negative for arthralgias, back pain and neck pain.   Skin:  Negative for pallor and rash.   Allergic/Immunologic: Negative for environmental allergies and immunocompromised  state.   Neurological:  Positive for dizziness and light-headedness. Negative for weakness and headaches.   Hematological:  Negative for adenopathy.   Psychiatric/Behavioral:  Negative for confusion, self-injury and suicidal ideas. The patient is not nervous/anxious.    All other systems reviewed and are negative.    Past Medical History:   Diagnosis Date    Acid reflux     Suarez's esophagus     Dr lucas. egd , . repeat in 3 years; .    Cancer     skin     Chronic bronchitis     Colitis     Diverticulosis     episode of diverticulitis in     Dysuria     Elevated glucose     Environmental allergies 2015    skin testing +trees, mold, grass    H/O mammogram 2020    Hyperlipidemia     Kidney stones     Mild asthma 2015    spirometry Dr Davisfev1 73%, after bronchodilator - 82%. official interpretation - mild restrictive defect    Osteoarthritis of left foot 2019    MRI Dr Munoz - will try injections    Pain in both feet     Peptic ulcer     tried and failed: pepcid, zantac, tums and nexium    Peripheral neuropathy     Swelling of lymph nodes        Allergies   Allergen Reactions    Codeine Nausea Only       Past Surgical History:   Procedure Laterality Date    COLONOSCOPY  2017    Dr. Aguirre    CYSTOSCOPY W/ LASER LITHOTRIPSY      ENDOSCOPY      suarez's esophagus    ENDOSCOPY  2017    Dr. Aguirre    ENDOSCOPY  2020    antral ulcers, large HH, Barretts    INNER EAR SURGERY      TONSILLECTOMY      TOTAL ABDOMINAL HYSTERECTOMY WITH SALPINGO OOPHORECTOMY      endometriosis       Family History   Problem Relation Age of Onset    Hypertension Mother     Colon polyps Mother     Arthritis Mother         joints    Cancer Mother         Basil cell and Squamous    Heart disease Mother         A-Fib    Hyperlipidemia Mother     Diabetes Father     Alcohol abuse Father             Diabetes Sister     Hypertension Sister     Hyperlipidemia  Sister     Diabetes Brother     Hypertension Brother     Melanoma Brother         started on side and has gone to his brain    Colon polyps Brother     Cancer Brother         Melanoma/Passes away from it    Heart disease Brother         A-Fib    Hyperlipidemia Brother     Diabetes Paternal Aunt     Diabetes Other     Diabetes Other     Colon cancer Neg Hx     Breast cancer Neg Hx        Social History     Socioeconomic History    Marital status:    Tobacco Use    Smoking status: Former     Packs/day: 1.50     Years: 37.00     Pack years: 55.50     Types: Cigarettes     Start date: 1975     Quit date: 2012     Years since quittin.6    Smokeless tobacco: Never    Tobacco comments:     16-60   Vaping Use    Vaping Use: Former   Substance and Sexual Activity    Alcohol use: No    Drug use: No    Sexual activity: Not Currently           Objective   Physical Exam  Vitals and nursing note reviewed.   Constitutional:       General: She is not in acute distress.     Appearance: Normal appearance. She is well-developed. She is not toxic-appearing or diaphoretic.   HENT:      Head: Normocephalic and atraumatic.      Right Ear: External ear normal.      Left Ear: External ear normal.      Nose: Nose normal.   Eyes:      General: Lids are normal.      Pupils: Pupils are equal, round, and reactive to light.   Neck:      Trachea: No tracheal deviation.   Cardiovascular:      Rate and Rhythm: Normal rate and regular rhythm.      Pulses: No decreased pulses.      Heart sounds: Normal heart sounds. No murmur heard.    No friction rub. No gallop.   Pulmonary:      Effort: Pulmonary effort is normal. No respiratory distress.      Breath sounds: Normal breath sounds. No decreased breath sounds, wheezing, rhonchi or rales.   Abdominal:      General: Bowel sounds are normal.      Palpations: Abdomen is soft.      Tenderness: There is no abdominal tenderness. There is no guarding or rebound.   Musculoskeletal:          General: No deformity. Normal range of motion.      Cervical back: Normal range of motion and neck supple.   Lymphadenopathy:      Cervical: No cervical adenopathy.   Skin:     General: Skin is warm and dry.      Findings: No rash.   Neurological:      Mental Status: She is alert and oriented to person, place, and time.      GCS: GCS eye subscore is 4. GCS verbal subscore is 5. GCS motor subscore is 6.      Cranial Nerves: No cranial nerve deficit.      Sensory: No sensory deficit.      Comments: No focal neurological deficit.  She is mildly ataxic when standing.   Psychiatric:         Speech: Speech normal.         Behavior: Behavior normal.         Thought Content: Thought content normal.         Judgment: Judgment normal.       Procedures           ED Course                                           Medical Decision Making  Differential diagnosis includes intracranial hemorrhage, intracranial mass, stroke, peripheral vertigo, dehydration, acute coronary syndrome, dysrhythmia, pneumonia, viral illness, other unspecified etiology.    Viral respiratory panel is negative.  Urine does not show infection.  Lab evaluation shows normal white count, normal H&H, kidney function, no significant electrolyte abnormalities.    Troponin is normal.      The patient had chest x-ray that showed no acute abnormality.    MRI of the brain without contrast shows no acute abnormality including no mass, bleed, or stroke.    The patient currently has a Holter monitor in place and is receiving outpatient cardiology evaluation.  She will be advised to pursue that cardiology evaluation until completion.    She reports feeling well and desired to go home.  Discharged with the advised to keep outpatient follow-up.    Problems Addressed:  Dizziness: complicated acute illness or injury with systemic symptoms that poses a threat to life or bodily functions    Amount and/or Complexity of Data Reviewed  External Data Reviewed: labs, radiology and  notes.  Labs: ordered. Decision-making details documented in ED Course.  Radiology: ordered and independent interpretation performed. Decision-making details documented in ED Course.    Risk  Prescription drug management.        Final diagnoses:   Dizziness       ED Disposition  ED Disposition       ED Disposition   Discharge    Condition   Stable    Comment   --               Waleska Miller MD  [address] [address]  [phone]    In 1 week           Medication List      No changes were made to your prescriptions during this visit.            Aysha Soto MD  08/28/23 [id number]

## 2023-08-27 NOTE — DISCHARGE INSTRUCTIONS
Make sure to drink plenty of fluids.    Follow-up with primary care physician for repeat evaluation, and further outpatient cardiology evaluation.    Return to the ER with any further concern.

## 2023-08-27 NOTE — Clinical Note
[facility] EMERGENCY DEPARTMENT  [address]  [facility] [address]  Phone: [phone]    Jyoti Puga was seen and treated in our emergency department on 8/27/2023.  She may return to work on 08/29/2023.         Thank you for choosing [facility].    Aysha Soto MD

## 2023-08-29 ENCOUNTER — PATIENT OUTREACH (OUTPATIENT)
Dept: CASE MANAGEMENT | Facility: OTHER | Age: 68
End: 2023-08-29
Payer: MEDICARE

## 2023-08-29 NOTE — OUTREACH NOTE
"Patient Outreach    AMBULATORY CASE MANAGEMENT NOTE    Name and Relationship of Patient/Support Person: Jyoti Puga \"Elsa\" - Self    Call placed to patient to follow up recent ED visit. Introduced self and role of ACM. Mrs Puga states she is doing better. Dizziness has improved. She does have an appointment with her PCP and cardiology. She is currently wearing an event monitor. Discussed her blood pressure perimeters and when to call. She is keeping a log to take to her next appointment. Denies any questions or concerns. Would like a call next month.    Education Documentation  Provider Follow-Up, taught by Tori Sher, RN at 8/29/2023 11:32 AM.  Learner: Patient  Readiness: Acceptance  Method: Explanation  Response: Needs Reinforcement    Medication Management, taught by Tori Sher, RN at 8/29/2023 11:32 AM.  Learner: Patient  Readiness: Acceptance  Method: Explanation  Response: Needs Reinforcement    Blood Pressure Monitoring, taught by Tori Sher, RN at 8/29/2023 11:32 AM.  Learner: Patient  Readiness: Acceptance  Method: Explanation  Response: Needs Reinforcement    Risk Factors, taught by Tori Sher, RN at 8/29/2023 11:32 AM.  Learner: Patient  Readiness: Acceptance  Method: Explanation  Response: Needs Reinforcement    Description, taught by Tori Sher, RN at 8/29/2023 11:32 AM.  Learner: Patient  Readiness: Acceptance  Method: Explanation  Response: Needs Reinforcement          Tori LINN  Ambulatory Case Management    8/29/2023, 11:32 EDT  "

## 2023-09-16 DIAGNOSIS — E78.00 PURE HYPERCHOLESTEROLEMIA: ICD-10-CM

## 2023-09-18 RX ORDER — FENOFIBRATE 160 MG/1
TABLET ORAL
Qty: 90 TABLET | Refills: 0 | Status: SHIPPED | OUTPATIENT
Start: 2023-09-18

## 2023-09-21 NOTE — PROGRESS NOTES
Chief Complaint  Palpitations and Dizziness    Subjective      History of Present Illness {CC  Problem List  Visit  Diagnosis   Encounters  Notes  Medications  Labs  Result Review Imaging  Media :23}     Jyoti Puga, 68 y.o. female with past medical history significant for hyperlipidemia, GERD, Estrada's esophagus, diverticulitis, depression, and asthma, who presents to [facility] for Palpitations and Dizziness  At time of last visit, CT angiogram was ordered but has not been completed due to financial reasons.  Patient was started on amlodipine at 5 mg daily for ongoing hypertension.    Since time of prior evaluation, she has episodes of dizziness while looking upward. She denies chest pain/pressure, palpitations, syncope, or near syncope.  She continues to have ongoing shortness of air with wheezing. She is also having productive cough as well. Patient is having some fatigue as well as mild lower extremity edema.  Patient is checking blood pressure at home which is running approximately 130/80.    Of note, she was seen in ED for dizziness on 8/27.  At that time, patient was noted to have negative viral panel, labs showed normal white count, normal H&H, kidney function, no significant electrolyte abnormalities.  Her troponin was also noted to be within normal limits.  Chest x-ray showed no acute abnormalities.  MRI of the brain was done which also showed no acute intracranial process, findings suggestive of mild chronic small vessel ischemic disease, and partial left mastoid effusion.      Holter monitor 9/8/2023:  Diagnostic time 13 days, 9 hours.  Minimum heart rate 49, average 85, maximum 212.  No episodes of A-fib/flutter, AV block, or pauses.  Patient did have multiple episodes of SVT.  She also had 2 episodes of VT with longest lasting 7 beats.  PAC burden noted to be 4%, PVC burden less than 1%.      Initial presentation:  Patient presented to primary care on  "6/12/2023, at which time twelve-lead EKG showed sinus rhythm with left bundle branch block, rate 65.  This was a new finding of a left bundle branch block.  Patient then underwent stress test on 8/10/2023 which revealed nondiagnostic pharmacologic nuclear stress test due to GI attenuation artifact, LVEF is normal at 55%, coronary artery calcification noted on CT attenuation correction images.  Patient was referred to cardiology for evaluation of abnormal stress test and evaluation of left bundle branch block which was a new finding.    Since time of evaluation by primary care, she denies any further chest pain/pressure. She endorses shortness of air, which is normally present but may be worse recently. Patient endorses palpitations mostly while at rest. Denies syncope, and lower extremity edema.  She does endorse fatigue. Has had one episode of dizziness associated with diarrhea and vomiting.  Does not endorse normal dizziness with position changes.  She states she is not very active usually. She is checking her blood pressure at home which is running 150/70.    Caffeine intake of 1 cup coffee daily  States hydration could use improvement at 2-3 bottles of water daily      Mother with atrial fibrillation in her 60's  Brother with atrial fibrillation in 60's    Objective     Vital Signs:   Vitals:    09/25/23 0911   BP: 130/83   BP Location: Left arm   Patient Position: Sitting   Cuff Size: Adult   Pulse: 79   Resp: 18   Temp: 97.1 °F (36.2 °C)   TempSrc: Temporal   SpO2: 96%   Weight: 103 kg (226 lb 9.6 oz)   Height: 167.6 cm (66\")     Body mass index is 36.57 kg/m².  Physical Exam  Vitals and nursing note reviewed.   Constitutional:       Appearance: Normal appearance.   HENT:      Head: Normocephalic.   Eyes:      Pupils: Pupils are equal, round, and reactive to light.   Cardiovascular:      Rate and Rhythm: Normal rate and regular rhythm.      Pulses: Normal pulses.      Heart sounds: Normal heart sounds. No " murmur heard.  Pulmonary:      Effort: Pulmonary effort is normal.      Breath sounds: Normal breath sounds.   Abdominal:      General: Bowel sounds are normal.      Palpations: Abdomen is soft.   Musculoskeletal:         General: Normal range of motion.      Cervical back: Normal range of motion.      Right lower le+ Edema present.      Left lower le+ Edema present.   Skin:     General: Skin is warm and dry.      Capillary Refill: Capillary refill takes less than 2 seconds.   Neurological:      Mental Status: She is alert and oriented to person, place, and time.   Psychiatric:         Mood and Affect: Mood normal.         Thought Content: Thought content normal.         Data Reviewed:{ Labs  Result Review  Imaging  Med Tab  Media :23}   Stress test with myocardial perfusion (08/10/2023 12:54)  ECG 12 Lead (2023 09:23)  Lab Results   Component Value Date    WBC 7.97 2023    HGB 15.7 2023    HCT 47.0 (H) 2023    MCV 91.1 2023     2023      Lab Results   Component Value Date    GLUCOSE 120 (H) 2023    BUN 14 2023    CREATININE 0.57 2023    EGFR 101.0 2023    BCR 24.6 2023    K 4.0 2023    CO2 28.0 2023    CALCIUM 10.4 2023    PROTENTOTREF 6.9 2018    ALBUMIN 4.5 2023    BILITOT 0.8 2023    AST 21 2023    ALT 13 2023      CT Chest Low Dose Cancer Screening WO (2023 13:22)     Assessment & Plan   Assessment and Plan {CC Problem List  Visit Diagnosis  ROS  Review (Popup)  Health Maintenance  Quality  BestPractice  Medications  SmartSets  SnapShot Encounters  Media :23}     1. Left bundle branch block  -Patient with new finding of left bundle branch block on EKG  -Stress test been done by primary care. Stress test on 8/10/2023 which revealed nondiagnostic pharmacologic nuclear stress test due to GI attenuation artifact, LVEF is normal at 55%, coronary artery calcification  noted on CT attenuation correction images  -Holter monitor 9/8/2023:  Diagnostic time 13 days, 9 hours.  Minimum heart rate 49, average 85, maximum 212.  No episodes of A-fib/flutter, AV block, or pauses.  Patient did have multiple episodes of SVT.  She also had 2 episodes of VT with longest lasting 7 beats.  PAC burden noted to be 4%, PVC burden less than 1%.  - CT Angiogram Coronary; Future  -Patient has upcoming appointment with Dr. Richard.  Encouraged patient to keep this appointment as scheduled  -Echocardiogram to be done on 9/26    2. Pure hypercholesterolemia  -Patient currently only on fenofibrate  -Based on coronary artery calcification noted on CT imaging during stress test, will start patient on Crestor 20 mg daily    3. Dizziness  -Patient states dizziness is now only when she looks upward.  She denies dizziness with position changes  -Holter monitor 9/8/2023:  Diagnostic time 13 days, 9 hours.  Minimum heart rate 49, average 85, maximum 212.  No episodes of A-fib/flutter, AV block, or pauses.  Patient did have multiple episodes of SVT.  She also had 2 episodes of VT with longest lasting 7 beats.  PAC burden noted to be 4%, PVC burden less than 1%.  - CT Angiogram Coronary; Future    4. Chest tightness  -Stress test on 8/10/2023 which revealed nondiagnostic pharmacologic nuclear stress test due to GI attenuation artifact, LVEF is normal at 55%, coronary artery calcification noted on CT attenuation correction images  - CT Angiogram Coronary; Future      5. Palpitations  -Holter monitor 9/8/2023:  Diagnostic time 13 days, 9 hours.  Minimum heart rate 49, average 85, maximum 212.  No episodes of A-fib/flutter, AV block, or pauses.  Patient did have multiple episodes of SVT.  She also had 2 episodes of VT with longest lasting 7 beats.  PAC burden noted to be 4%, PVC burden less than 1%.  -We will initiate patient on metoprolol succinate 25 mg daily      6.  Hypertension  -Currently on amlodipine 5 mg daily,  will continue  -Encouraged patient to continue ambulatory blood pressure monitoring.  She was instructed to notify clinic of ongoing hyper hypotension.  Patient encouraged to call if blood pressure greater than 140 systolic or less than 110 systolic  -Patient to keep scheduled echocardiogram appointment  -Encouraged patient to keep scheduled follow-up with Dr. Richard      Follow Up {Instructions Charge Capture  Follow-up Communications :23}     Return if symptoms worsen or fail to improve, for Chest pain.      Patient was given instructions and counseling regarding her condition or for health maintenance advice. Please see specific information pulled into the AVS if appropriate.  Patient was instructed to call the [facility] with any questions, concerns, or worsening symptoms.    Dictated Utilizing Dragon Dictation   Please note that portions of this note were completed with a voice recognition program.   Part of this note may be an electronic transcription/translation of spoken language to printed text using the Dragon Dictation System.

## 2023-09-25 ENCOUNTER — OFFICE VISIT (OUTPATIENT)
Dept: CARDIOLOGY | Facility: HOSPITAL | Age: 68
End: 2023-09-25

## 2023-09-25 VITALS
BODY MASS INDEX: 36.42 KG/M2 | HEIGHT: 66 IN | WEIGHT: 226.6 LBS | RESPIRATION RATE: 18 BRPM | OXYGEN SATURATION: 96 % | SYSTOLIC BLOOD PRESSURE: 130 MMHG | HEART RATE: 79 BPM | DIASTOLIC BLOOD PRESSURE: 83 MMHG | TEMPERATURE: 97.1 F

## 2023-09-25 DIAGNOSIS — R00.2 PALPITATIONS: ICD-10-CM

## 2023-09-25 DIAGNOSIS — I44.7 LEFT BUNDLE BRANCH BLOCK: Primary | ICD-10-CM

## 2023-09-25 DIAGNOSIS — R07.89 CHEST TIGHTNESS: ICD-10-CM

## 2023-09-25 PROCEDURE — 99214 OFFICE O/P EST MOD 30 MIN: CPT | Performed by: NURSE PRACTITIONER

## 2023-09-25 RX ORDER — ROSUVASTATIN CALCIUM 20 MG/1
20 TABLET, COATED ORAL DAILY
Qty: 30 TABLET | Refills: 3 | Status: SHIPPED | OUTPATIENT
Start: 2023-09-25

## 2023-09-25 RX ORDER — METOPROLOL SUCCINATE 25 MG/1
25 TABLET, EXTENDED RELEASE ORAL DAILY
Qty: 30 TABLET | Refills: 3 | Status: SHIPPED | OUTPATIENT
Start: 2023-09-25

## 2023-09-26 ENCOUNTER — HOSPITAL ENCOUNTER (OUTPATIENT)
Dept: CARDIOLOGY | Facility: HOSPITAL | Age: 68
Discharge: HOME | End: 2023-09-26
Payer: MEDICARE

## 2023-09-26 ENCOUNTER — HOSPITAL ENCOUNTER (OUTPATIENT)
Dept: MRI IMAGING | Facility: HOSPITAL | Age: 68
Discharge: HOME | End: 2023-09-26
Payer: MEDICARE
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
## 2023-09-26 DIAGNOSIS — R42 DIZZINESS: ICD-10-CM

## 2023-09-26 DIAGNOSIS — R00.2 PALPITATIONS: ICD-10-CM

## 2023-09-26 DIAGNOSIS — R07.89 CHEST TIGHTNESS: ICD-10-CM

## 2023-09-26 DIAGNOSIS — R55 SYNCOPE, UNSPECIFIED SYNCOPE TYPE: ICD-10-CM

## 2023-09-26 LAB
BH CV ECHO MEAS - AO MAX PG: 12.4 MMHG
BH CV ECHO MEAS - AO MEAN PG: 7 MMHG
BH CV ECHO MEAS - AO ROOT DIAM: 2.9 CM
BH CV ECHO MEAS - AO V2 MAX: 176 CM/SEC
BH CV ECHO MEAS - AO V2 VTI: 39.9 CM
BH CV ECHO MEAS - AVA(I,D): 1.7 CM2
BH CV ECHO MEAS - EDV(CUBED): 110.6 ML
BH CV ECHO MEAS - EDV(MOD-SP2): 235 ML
BH CV ECHO MEAS - EDV(MOD-SP4): 154 ML
BH CV ECHO MEAS - EF(MOD-BP): 47 %
BH CV ECHO MEAS - EF(MOD-SP2): 52.3 %
BH CV ECHO MEAS - EF(MOD-SP4): 46.2 %
BH CV ECHO MEAS - ESV(CUBED): 46.7 ML
BH CV ECHO MEAS - ESV(MOD-SP2): 112 ML
BH CV ECHO MEAS - ESV(MOD-SP4): 82.9 ML
BH CV ECHO MEAS - FS: 25 %
BH CV ECHO MEAS - IVS/LVPW: 1 CM
BH CV ECHO MEAS - IVSD: 1 CM
BH CV ECHO MEAS - LA DIMENSION: 3.2 CM
BH CV ECHO MEAS - LAT PEAK E' VEL: 11.5 CM/SEC
BH CV ECHO MEAS - LV DIASTOLIC VOL/BSA (35-75): 73.3 CM2
BH CV ECHO MEAS - LV MASS(C)D: 170.2 GRAMS
BH CV ECHO MEAS - LV MAX PG: 4 MMHG
BH CV ECHO MEAS - LV MEAN PG: 2 MMHG
BH CV ECHO MEAS - LV SYSTOLIC VOL/BSA (12-30): 39.5 CM2
BH CV ECHO MEAS - LV V1 MAX: 99.8 CM/SEC
BH CV ECHO MEAS - LV V1 VTI: 21.6 CM
BH CV ECHO MEAS - LVIDD: 4.8 CM
BH CV ECHO MEAS - LVIDS: 3.6 CM
BH CV ECHO MEAS - LVOT AREA: 3.1 CM2
BH CV ECHO MEAS - LVOT DIAM: 2 CM
BH CV ECHO MEAS - LVPWD: 1 CM
BH CV ECHO MEAS - MED PEAK E' VEL: 8.3 CM/SEC
BH CV ECHO MEAS - MV A MAX VEL: 74.6 CM/SEC
BH CV ECHO MEAS - MV DEC SLOPE: 293 CM/SEC2
BH CV ECHO MEAS - MV E MAX VEL: 67.2 CM/SEC
BH CV ECHO MEAS - MV E/A: 0.9
BH CV ECHO MEAS - MV MAX PG: 5.5 MMHG
BH CV ECHO MEAS - MV MEAN PG: 2 MMHG
BH CV ECHO MEAS - MV P1/2T: 114 MSEC
BH CV ECHO MEAS - MV V2 VTI: 41.2 CM
BH CV ECHO MEAS - MVA(P1/2T): 1.93 CM2
BH CV ECHO MEAS - MVA(VTI): 1.65 CM2
BH CV ECHO MEAS - PA ACC TIME: 0.1 SEC
BH CV ECHO MEAS - SI(MOD-SP2): 58.5 ML/M2
BH CV ECHO MEAS - SI(MOD-SP4): 33.8 ML/M2
BH CV ECHO MEAS - SV(LVOT): 67.9 ML
BH CV ECHO MEAS - SV(MOD-SP2): 123 ML
BH CV ECHO MEAS - SV(MOD-SP4): 71.1 ML
BH CV ECHO MEASUREMENTS AVERAGE E/E' RATIO: 6.79
BH CV XLRA - RV BASE: 3.8 CM
BH CV XLRA - RV LENGTH: 7.2 CM
BH CV XLRA - RV MID: 2.9 CM
BH CV XLRA - TDI S': 21.5 CM/SEC
LEFT ATRIUM VOLUME INDEX: 31.8 ML/M2

## 2023-09-26 PROCEDURE — A9577 INJ MULTIHANCE: HCPCS | Performed by: NURSE PRACTITIONER

## 2023-09-26 PROCEDURE — 0 GADOBENATE DIMEGLUMINE 529 MG/ML SOLUTION: Performed by: NURSE PRACTITIONER

## 2023-09-26 PROCEDURE — 70553 MRI BRAIN STEM W/O & W/DYE: CPT

## 2023-09-26 PROCEDURE — 93880 EXTRACRANIAL BILAT STUDY: CPT

## 2023-09-26 PROCEDURE — 93306 TTE W/DOPPLER COMPLETE: CPT

## 2023-09-26 RX ADMIN — GADOBENATE DIMEGLUMINE 20 ML: 529 INJECTION, SOLUTION INTRAVENOUS at 16:11

## 2023-09-27 LAB
BH CV XLRA MEAS LEFT DIST CCA EDV: 30.6 CM/SEC
BH CV XLRA MEAS LEFT DIST CCA PSV: 100 CM/SEC
BH CV XLRA MEAS LEFT DIST ICA EDV: 14.9 CM/SEC
BH CV XLRA MEAS LEFT DIST ICA PSV: 42.7 CM/SEC
BH CV XLRA MEAS LEFT ICA/CCA RATIO: 0.92
BH CV XLRA MEAS LEFT MID CCA EDV: 28.2 CM/SEC
BH CV XLRA MEAS LEFT MID CCA PSV: 83.1 CM/SEC
BH CV XLRA MEAS LEFT MID ICA EDV: 28 CM/SEC
BH CV XLRA MEAS LEFT MID ICA PSV: 69.4 CM/SEC
BH CV XLRA MEAS LEFT PROX CCA EDV: 31 CM/SEC
BH CV XLRA MEAS LEFT PROX CCA PSV: 108.5 CM/SEC
BH CV XLRA MEAS LEFT PROX ECA EDV: 13.3 CM/SEC
BH CV XLRA MEAS LEFT PROX ECA PSV: 91.9 CM/SEC
BH CV XLRA MEAS LEFT PROX ICA EDV: 20.3 CM/SEC
BH CV XLRA MEAS LEFT PROX ICA PSV: 73.6 CM/SEC
BH CV XLRA MEAS LEFT PROX SCLA PSV: 171.4 CM/SEC
BH CV XLRA MEAS LEFT VERTEBRAL A EDV: 19.2 CM/SEC
BH CV XLRA MEAS LEFT VERTEBRAL A PSV: 59.5 CM/SEC
BH CV XLRA MEAS RIGHT DIST CCA EDV: 30.3 CM/SEC
BH CV XLRA MEAS RIGHT DIST CCA PSV: 92.7 CM/SEC
BH CV XLRA MEAS RIGHT DIST ICA EDV: 22 CM/SEC
BH CV XLRA MEAS RIGHT DIST ICA PSV: 57.5 CM/SEC
BH CV XLRA MEAS RIGHT ICA/CCA RATIO: 0.75
BH CV XLRA MEAS RIGHT MID CCA EDV: 18.2 CM/SEC
BH CV XLRA MEAS RIGHT MID CCA PSV: 95.3 CM/SEC
BH CV XLRA MEAS RIGHT MID ICA EDV: 20.5 CM/SEC
BH CV XLRA MEAS RIGHT MID ICA PSV: 55.9 CM/SEC
BH CV XLRA MEAS RIGHT PROX CCA EDV: 18.2 CM/SEC
BH CV XLRA MEAS RIGHT PROX CCA PSV: 110 CM/SEC
BH CV XLRA MEAS RIGHT PROX ECA EDV: 17.2 CM/SEC
BH CV XLRA MEAS RIGHT PROX ECA PSV: 95.6 CM/SEC
BH CV XLRA MEAS RIGHT PROX ICA EDV: 32.3 CM/SEC
BH CV XLRA MEAS RIGHT PROX ICA PSV: 71.4 CM/SEC
BH CV XLRA MEAS RIGHT PROX SCLA PSV: 121.3 CM/SEC
BH CV XLRA MEAS RIGHT VERTEBRAL A EDV: 12.8 CM/SEC
BH CV XLRA MEAS RIGHT VERTEBRAL A PSV: 52.1 CM/SEC
LEFT ARM BP: NORMAL MMHG
RIGHT ARM BP: NORMAL MMHG

## 2023-09-28 ENCOUNTER — TELEPHONE (OUTPATIENT)
Dept: INTERNAL MEDICINE | Facility: CLINIC | Age: 68
End: 2023-09-28
Payer: MEDICARE

## 2023-09-28 NOTE — TELEPHONE ENCOUNTER
----- Message from NELLIE Cabral sent at 9/28/2023  5:32 AM EDT -----  Carotid duplex was within normal limits

## 2023-10-04 ENCOUNTER — OFFICE VISIT (OUTPATIENT)
Dept: INTERNAL MEDICINE | Facility: CLINIC | Age: 68
End: 2023-10-04
Payer: MEDICARE

## 2023-10-04 VITALS
OXYGEN SATURATION: 96 % | SYSTOLIC BLOOD PRESSURE: 122 MMHG | HEART RATE: 72 BPM | HEIGHT: 66 IN | TEMPERATURE: 97.5 F | WEIGHT: 226 LBS | BODY MASS INDEX: 36.32 KG/M2 | DIASTOLIC BLOOD PRESSURE: 64 MMHG

## 2023-10-04 DIAGNOSIS — I10 PRIMARY HYPERTENSION: ICD-10-CM

## 2023-10-04 DIAGNOSIS — E78.00 PURE HYPERCHOLESTEROLEMIA: Chronic | ICD-10-CM

## 2023-10-04 DIAGNOSIS — J45.20 MILD INTERMITTENT ASTHMA WITHOUT COMPLICATION: Primary | ICD-10-CM

## 2023-10-04 DIAGNOSIS — R42 DIZZINESS: ICD-10-CM

## 2023-10-04 DIAGNOSIS — I44.7 LEFT BUNDLE BRANCH BLOCK: ICD-10-CM

## 2023-10-04 PROCEDURE — 3078F DIAST BP <80 MM HG: CPT | Performed by: INTERNAL MEDICINE

## 2023-10-04 PROCEDURE — 99214 OFFICE O/P EST MOD 30 MIN: CPT | Performed by: INTERNAL MEDICINE

## 2023-10-04 PROCEDURE — 1160F RVW MEDS BY RX/DR IN RCRD: CPT | Performed by: INTERNAL MEDICINE

## 2023-10-04 PROCEDURE — 1159F MED LIST DOCD IN RCRD: CPT | Performed by: INTERNAL MEDICINE

## 2023-10-04 PROCEDURE — 3074F SYST BP LT 130 MM HG: CPT | Performed by: INTERNAL MEDICINE

## 2023-10-04 RX ORDER — FLUTICASONE PROPIONATE 50 MCG
2 SPRAY, SUSPENSION (ML) NASAL DAILY
Qty: 48 G | Refills: 3 | Status: SHIPPED | OUTPATIENT
Start: 2023-10-04

## 2023-10-04 RX ORDER — LEVALBUTEROL INHALATION SOLUTION 1.25 MG/3ML
1 SOLUTION RESPIRATORY (INHALATION) EVERY 4 HOURS PRN
Qty: 90 ML | Refills: 12 | Status: SHIPPED | OUTPATIENT
Start: 2023-10-04

## 2023-10-04 NOTE — PROGRESS NOTES
Answers submitted by the patient for this visit:  Other (Submitted on 9/27/2023)  Please describe your symptoms.: Follow up of test being done  Have you had these symptoms before?: Yes  How long have you been having these symptoms?: Greater than 2 weeks  Primary Reason for Visit (Submitted on 9/27/2023)  What is the primary reason for your visit?: Other    Chief Complaint  Results (Follow up on some tests she has had done.)    Subjective          Jyoti Puga presents to [facility]  History of Present Illness    Left bundle branch block-patient has been evaluated by cardiology and a CTA has been ordered but not been secondary to cost.  Patient has been started on Crestor as there were calcifications on her CT lung cancer screening done earlier this year  She is taking baby asa daily as well  No CP, does still have days where she feels more SOB    Palpitations-patient had a Holter monitor which did show runs of SVT and V. tach.  She has been placed on metoprolol and has not noticed the palpitations as often    Hypertension-she has been started on amlodipine 5 mg. She does check and usually 120s/70s. She has toelrated    Asthma-has a nebulizer but stopped the albuterol as it increased to palpitations. She tried the budseonide but didn't seem to help, tried for about a week.  She has been using flonase and a saline NS which seem to help her breathing    Dizziness - not as bad - mainly if she lifts her head too quickly.    Insomnia - tried melatonin but made her too sleepy during the day. Usually falls alssep for about 3 hrs then wakes up  No ETOH  Has used benadryl which does help        Current Outpatient Medications:     acetaminophen (TYLENOL) 650 MG 8 hr tablet, Take 1 tablet by mouth Daily As Needed., Disp: , Rfl:     amLODIPine (NORVASC) 5 MG tablet, Take 1 tablet by mouth Daily., Disp: 30 tablet, Rfl: 1    budesonide (PULMICORT) 0.5 MG/2ML nebulizer solution, Take 2 mL by  "nebulization Every Night., Disp: , Rfl:     Cholecalciferol (vitamin D3) 125 MCG (5000 UT) capsule capsule, Take 1 capsule by mouth Every 7 (Seven) Days., Disp: , Rfl:     diclofenac (VOLTAREN) 1 % gel gel, Apply 4 g topically to the appropriate area as directed 3 (Three) Times a Day. Apply to foot, Disp: 100 g, Rfl: 11    diphenhydrAMINE (BENADRYL) 25 mg capsule, Take 1 capsule by mouth Daily As Needed for Allergies., Disp: , Rfl:     fenofibrate 160 MG tablet, Take 1 tablet by mouth once daily, Disp: 90 tablet, Rfl: 0    ferrous sulfate 325 (65 FE) MG tablet, Take 1 tablet by mouth Daily With Breakfast., Disp: , Rfl:     fluticasone (FLONASE) 50 MCG/ACT nasal spray, 2 sprays into the nostril(s) as directed by provider Daily., Disp: 48 g, Rfl: 3    gabapentin (NEURONTIN) 600 MG tablet, TAKE 1 TABLET BY MOUTH TWICE A DAY, Disp: 180 tablet, Rfl: 1    meclizine (ANTIVERT) 25 MG tablet, Take 1 tablet by mouth 3 (Three) Times a Day As Needed for Dizziness., Disp: 90 tablet, Rfl: 0    metoprolol succinate XL (TOPROL-XL) 25 MG 24 hr tablet, Take 1 tablet by mouth Daily., Disp: 30 tablet, Rfl: 3    RABEprazole (ACIPHEX) 20 MG EC tablet, Take 1 tablet by mouth 2 (Two) Times a Day., Disp: 180 tablet, Rfl: 3    rosuvastatin (CRESTOR) 20 MG tablet, Take 1 tablet by mouth Daily., Disp: 30 tablet, Rfl: 3    levalbuterol (Xopenex) 1.25 MG/3ML nebulizer solution, Take 1 ampule by nebulization Every 4 (Four) Hours As Needed for Wheezing., Disp: 90 mL, Rfl: 12   The following portions of the patient's history were reviewed and updated as appropriate: allergies, current medications, past family history, past medical history, past social history, past surgical history and problem list.     Objective   Vital Signs:   /64 (BP Location: Right arm, Patient Position: Sitting)   Pulse 72   Temp 97.5 °F (36.4 °C) (Infrared)   Ht 167.6 cm (66\")   Wt 103 kg (226 lb)   SpO2 96%   BMI 36.48 kg/m²       Physical exam  Constitutional: " oriented to person, place, and time.  well-developed and well-nourished. No distress.   HENT:   Head: Normocephalic and atraumatic.   Eyes: Conjunctivae and EOM are normal.   Cardiovascular: Normal rate, regular rhythm and normal heart sounds.  Exam reveals no gallop and no friction rub.    No murmur heard.  Pulmonary/Chest: Effort normal and breath sounds normal. No respiratory distress.   no wheezes.   Neurological:  alert and oriented to person, place, and time.   Skin: Skin is warm and dry. not diaphoretic.   PV: no edema  Psychiatric:  normal mood and affect. behavior is normal. Judgment and thought content normal.      Result Review :   The following data was reviewed by: Waleska Miller MD on 10/04/2023:  CMP          6/12/2023    10:03 8/7/2023    10:45 8/27/2023    17:42   CMP   Glucose 105  92  120    BUN 14  14  14    Creatinine 0.65  0.60  0.57    EGFR 97.8  99.8  101.0    Sodium 141  143  141    Potassium 4.7  4.1  4.0    Chloride 100  105  100    Calcium 11.1  8.3  10.4    Total Protein 7.1  6.0  7.3    Albumin 4.7  4.0  4.5    Globulin 2.4  2.0  2.8    Total Bilirubin 0.6  0.7  0.8    Alkaline Phosphatase 43  41  43    AST (SGOT) 27  21  21    ALT (SGPT) 20  11  13    Albumin/Globulin Ratio 2.0  2.0  1.6    BUN/Creatinine Ratio 21.5  23.3  24.6    Anion Gap 10.2  9.6  13.0      CBC w/diff          6/12/2023    10:03 8/7/2023    10:45 8/27/2023    15:28   CBC w/Diff   WBC 6.39  6.66  7.97    RBC 4.78  4.63  5.16    Hemoglobin 14.5  14.1  15.7    Hematocrit 43.3  41.2  47.0    MCV 90.6  89.0  91.1    MCH 30.3  30.5  30.4    MCHC 33.5  34.2  33.4    RDW 12.4  12.3  12.7    Platelets 235  214  257    Neutrophil Rel %  60.2  77.0    Immature Granulocyte Rel %  0.3  0.3    Lymphocyte Rel %  29.4  16.1    Monocyte Rel %  7.8  5.3    Eosinophil Rel %  1.4  0.8    Basophil Rel %  0.9  0.5      Lipid Panel          6/12/2023    10:03   Lipid Panel   Total Cholesterol 190    Triglycerides 111    HDL Cholesterol  69    VLDL Cholesterol 20    LDL Cholesterol  101    LDL/HDL Ratio 1.43      TSH          6/12/2023    10:03   TSH   TSH 2.690      Lab Results   Component Value Date    KGYZ39VY 56.7 06/12/2023    QMBKRAES49 1,030 (H) 12/19/2018     Data reviewed : Radiologic studies ct chest, mri head, Cardiology studies stress test, holter, and Consultant notes cardiology note           Assessment and Plan    Diagnoses and all orders for this visit:    1. Mild intermittent asthma without complication (Primary)- we will see if xopenex nebulizer is better tolerated. We will fax it in to the [facility]  -     levalbuterol (Xopenex) 1.25 MG/3ML nebulizer solution; Take 1 ampule by nebulization Every 4 (Four) Hours As Needed for Wheezing.  Dispense: 90 mL; Refill: 12    2. Pure hypercholesterolemia- on crestor and tricor -w e can recheck in 2/24    3. Primary hypertension- looks good on norvasc    4. Dizziness- has improved  -     fluticasone (FLONASE) 50 MCG/ACT nasal spray; 2 sprays into the nostril(s) as directed by provider Daily.  Dispense: 48 g; Refill: 3    5. Left bundle branch block- getting cardiology evaluation    Other orders    Prev: we discussed RSV vaccine; she declines flu vaccine      Follow Up   Return in about 4 months (around 2/4/2024).  Patient was given instructions and counseling regarding her condition or for health maintenance advice. Please see specific information pulled into the AVS if appropriate.

## 2023-10-06 ENCOUNTER — PATIENT OUTREACH (OUTPATIENT)
Dept: CASE MANAGEMENT | Facility: OTHER | Age: 68
End: 2023-10-06
Payer: MEDICARE

## 2023-10-06 NOTE — OUTREACH NOTE
"Care Coordination    AMBULATORY CASE MANAGEMENT NOTE    Name and Relationship of Patient/Support Person: Edd Jyoti Sue \"Elsa\" - Self    Call for check in. Mrs Puga states she is doing well. She has followed up with both her PCP and cardiologist. Denies any questions or concerns. Long enjoyable conversation. She does not feel she needs any further follow up.         Tori LINN  Ambulatory Case Management    10/6/2023, 13:53 EDT  "

## 2023-10-09 ENCOUNTER — OFFICE VISIT (OUTPATIENT)
Dept: CARDIOLOGY | Facility: CLINIC | Age: 68
End: 2023-10-09
Payer: MEDICARE

## 2023-10-09 ENCOUNTER — PATIENT ROUNDING (BHMG ONLY) (OUTPATIENT)
Dept: CARDIOLOGY | Facility: CLINIC | Age: 68
End: 2023-10-09

## 2023-10-09 VITALS
SYSTOLIC BLOOD PRESSURE: 130 MMHG | DIASTOLIC BLOOD PRESSURE: 80 MMHG | HEART RATE: 73 BPM | WEIGHT: 224 LBS | HEIGHT: 66 IN | OXYGEN SATURATION: 95 % | BODY MASS INDEX: 36 KG/M2

## 2023-10-09 DIAGNOSIS — R94.39 ABNORMAL STRESS TEST: ICD-10-CM

## 2023-10-09 DIAGNOSIS — I20.0 UNSTABLE ANGINA: Primary | ICD-10-CM

## 2023-10-09 PROCEDURE — 3075F SYST BP GE 130 - 139MM HG: CPT | Performed by: INTERNAL MEDICINE

## 2023-10-09 PROCEDURE — 99214 OFFICE O/P EST MOD 30 MIN: CPT | Performed by: INTERNAL MEDICINE

## 2023-10-09 PROCEDURE — 3079F DIAST BP 80-89 MM HG: CPT | Performed by: INTERNAL MEDICINE

## 2023-10-09 RX ORDER — AMLODIPINE BESYLATE 5 MG/1
5 TABLET ORAL DAILY
Qty: 30 TABLET | Refills: 3 | Status: SHIPPED | OUTPATIENT
Start: 2023-10-09

## 2023-10-09 NOTE — PROGRESS NOTES
October 9, 2023    Hello, may I speak with Jyoti Puga?    My name is Eloisa      I am  with [facility]  [address]  [facility] [address] [phone].    Before we get started may I verify your date of birth? 1955    I am calling to officially welcome you to our practice and ask about your recent visit. Is this a good time to talk? yes    Tell me about your visit with us. What things went well?  everything       We're always looking for ways to make our patients' experiences even better. Do you have recommendations on ways we may improve?  no    Overall were you satisfied with your first visit to our practice? yes       I appreciate you taking the time to speak with me today. Is there anything else I can do for you? no      Thank you, and have a great day.

## 2023-10-09 NOTE — PROGRESS NOTES
[facility] Cardiology  Office visit  Jyoti Puga  1955  [phone]  There is no work phone number on file.    VISIT DATE:  10/9/2023    PCP: Waleska Miller MD  [address]  [facility] [address]    CC:  Chief Complaint   Patient presents with    Left bundle branch block       Previous cardiac studies and procedures:  2017 stress echocardiogram  The test is negative for anginal symptoms or diagnostic ST segment changes.  Resting echocardiogram showed normal left ventricular systolic function, estimated ejection fraction 60%, mild mitral irritation, mild tricuspid regurg dilatation.  Normal stress echocardiogram, no evidence of dobutamine-induced ischemia.  Normal post stress ejection fraction of 75%.    September 2023  TTE    Left ventricular systolic function is low normal. Calculated left ventricular EF = 47% Left ventricular ejection fraction appears to be 51 - 55%.    Left ventricular diastolic function was normal.  Myocardial perfusion imaging    Nondiagnostic pharmacologic nuclear stress test due to GI attenuation artifact    Left ventricular ejection fraction is normal (Calculated EF = 55%).    Coronary artery calcification noted on CT attenuation correction images.    October 2023 ambulatory ECG monitor, 2-week: NSVT, runs of nonsustained atrial tachycardia.    ASSESSMENT:   Diagnosis Plan   1. Unstable angina  Case Request Cath Lab: Left Heart Cath      2. Abnormal stress test  Case Request Cath Lab: Left Heart Cath          PLAN:  Unstable angina: New onset persistent angina class II pattern despite 2 antianginal agents.  Interval drop in LV systolic function.  Significant coronary calcifications visualized in the proximal and mid LAD.  Episodes of nonsustained ventricular tachycardia noted on ambulatory ECG monitoring.  Recommending left heart catheterization for more definitive evaluation of coronary anatomy.  Continue current medical  "therapy.    Hypertension: Goal less than 130/80 mmHg.  Currently well controlled, continue current medical therapy.    Hyperlipidemia: Goal LDL less than 70.  Continue high intensity statin therapy, rosuvastatin 20 mg p.o. daily in addition to fenofibrate 160 mg p.o. daily.    Subjective  68-year-old previous smoker with history of hypertension dyslipidemia presenting with exertional chest discomfort.  Initially presented earlier this summer with exertional nausea and diaphoresis as she was mowing her lawn which lasted for 2 to 3 days.  Since that time when she tries to exercise she develops left precordial chest discomfort which will often radiate to her left arm with associated dyspnea.  Relieved with rest.  Class II pattern.  Currently without any rest symptoms.  She is compliant with medical therapy to include calcium channel blockade, beta-blockade, aspirin and statin therapy.  Reviewed previous cardiac evaluation.    PHYSICAL EXAMINATION:  Vitals:    10/09/23 1051   BP: 130/80   BP Location: Right arm   Patient Position: Sitting   Cuff Size: Adult   Pulse: 73   SpO2: 95%   Weight: 102 kg (224 lb)   Height: 167.6 cm (66\")     General Appearance:    Alert, cooperative, no distress, appears stated age   Head:    Normocephalic, without obvious abnormality, atraumatic   Eyes:    conjunctiva/corneas clear   Nose:   Nares normal, septum midline, mucosa normal, no drainage   Throat:   Lips, teeth and gums normal   Neck:   Supple, symmetrical, trachea midline, no carotid    bruit or JVD   Lungs:     Clear to auscultation bilaterally, respirations unlabored   Chest Wall:    No tenderness or deformity    Heart:    Regular rate and rhythm, S1 and S2 normal, no murmur, rub   or gallop, normal carotid impulse bilaterally without bruit.   Abdomen:     Soft, non-tender   Extremities:   Extremities normal, atraumatic, no cyanosis or edema   Pulses:   2+ and symmetric all extremities   Skin:   Skin color, texture, turgor " normal, no rashes or lesions       Diagnostic Data:  Procedures  Lab Results   Component Value Date    TRIG 111 06/12/2023    HDL 69 (H) 06/12/2023     Lab Results   Component Value Date    GLUCOSE 120 (H) 08/27/2023    BUN 14 08/27/2023    CREATININE 0.57 08/27/2023     08/27/2023    K 4.0 08/27/2023     08/27/2023    CO2 28.0 08/27/2023     Lab Results   Component Value Date    HGBA1C 5.80 (H) 06/12/2023     Lab Results   Component Value Date    WBC 7.97 08/27/2023    HGB 15.7 08/27/2023    HCT 47.0 (H) 08/27/2023     08/27/2023       Allergies  Allergies   Allergen Reactions    Codeine Nausea Only       Current Medications    Current Outpatient Medications:     acetaminophen (TYLENOL) 650 MG 8 hr tablet, Take 1 tablet by mouth Daily As Needed., Disp: , Rfl:     amLODIPine (NORVASC) 5 MG tablet, Take 1 tablet by mouth Daily., Disp: 30 tablet, Rfl: 1    budesonide (PULMICORT) 0.5 MG/2ML nebulizer solution, Take 2 mL by nebulization Every Night., Disp: , Rfl:     Cholecalciferol (vitamin D3) 125 MCG (5000 UT) capsule capsule, Take 1 capsule by mouth Every 7 (Seven) Days., Disp: , Rfl:     diclofenac (VOLTAREN) 1 % gel gel, Apply 4 g topically to the appropriate area as directed 3 (Three) Times a Day. Apply to foot, Disp: 100 g, Rfl: 11    diphenhydrAMINE (BENADRYL) 25 mg capsule, Take 1 capsule by mouth Daily As Needed for Allergies., Disp: , Rfl:     fenofibrate 160 MG tablet, Take 1 tablet by mouth once daily, Disp: 90 tablet, Rfl: 0    ferrous sulfate 325 (65 FE) MG tablet, Take 1 tablet by mouth Daily With Breakfast., Disp: , Rfl:     fluticasone (FLONASE) 50 MCG/ACT nasal spray, 2 sprays into the nostril(s) as directed by provider Daily., Disp: 48 g, Rfl: 3    gabapentin (NEURONTIN) 600 MG tablet, TAKE 1 TABLET BY MOUTH TWICE A DAY, Disp: 180 tablet, Rfl: 1    levalbuterol (Xopenex) 1.25 MG/3ML nebulizer solution, Take 1 ampule by nebulization Every 4 (Four) Hours As Needed for Wheezing.,  Disp: 90 mL, Rfl: 12    meclizine (ANTIVERT) 25 MG tablet, Take 1 tablet by mouth 3 (Three) Times a Day As Needed for Dizziness., Disp: 90 tablet, Rfl: 0    metoprolol succinate XL (TOPROL-XL) 25 MG 24 hr tablet, Take 1 tablet by mouth Daily., Disp: 30 tablet, Rfl: 3    RABEprazole (ACIPHEX) 20 MG EC tablet, Take 1 tablet by mouth 2 (Two) Times a Day., Disp: 180 tablet, Rfl: 3    rosuvastatin (CRESTOR) 20 MG tablet, Take 1 tablet by mouth Daily., Disp: 30 tablet, Rfl: 3          ROS  ROS      SOCIAL HX  Social History     Socioeconomic History    Marital status:     Number of children: 0   Tobacco Use    Smoking status: Former     Packs/day: 1.50     Years: 37.00     Additional pack years: 0.00     Total pack years: 55.50     Types: Cigarettes     Start date: 1975     Quit date: 2012     Years since quittin.7    Smokeless tobacco: Never    Tobacco comments:     16-60   Vaping Use    Vaping Use: Former    Substances: Nicotine    Devices: Disposable   Substance and Sexual Activity    Alcohol use: No    Drug use: No    Sexual activity: Not Currently       FAMILY HX  Family History   Problem Relation Age of Onset    Hypertension Mother     Colon polyps Mother     Arthritis Mother         joints    Cancer Mother         Basil cell and Squamous    Heart disease Mother         A-Fib    Hyperlipidemia Mother     Diabetes Father     Alcohol abuse Father             Diabetes Sister     Hypertension Sister     Hyperlipidemia Sister     Diabetes Brother     Hypertension Brother     Melanoma Brother         started on side and has gone to his brain    Colon polyps Brother     Cancer Brother         Melanoma/Passes away from it    Heart disease Brother         A-Fib    Hyperlipidemia Brother     Diabetes Paternal Aunt     Diabetes Other     Diabetes Other     Colon cancer Neg Hx     Breast cancer Neg Hx              González Richard III, MD, FACC

## 2023-10-10 DIAGNOSIS — I20.0 UNSTABLE ANGINA: Primary | ICD-10-CM

## 2023-10-10 DIAGNOSIS — Z00.00 HEALTHCARE MAINTENANCE: ICD-10-CM

## 2023-10-10 RX ORDER — ASPIRIN 81 MG/1
324 TABLET, CHEWABLE ORAL ONCE
Status: CANCELLED | OUTPATIENT
Start: 2023-10-10 | End: 2023-10-10

## 2023-10-10 RX ORDER — SODIUM CHLORIDE 9 MG/ML
40 INJECTION, SOLUTION INTRAVENOUS AS NEEDED
Status: CANCELLED | OUTPATIENT
Start: 2023-10-10

## 2023-10-10 RX ORDER — SODIUM CHLORIDE 0.9 % (FLUSH) 0.9 %
10 SYRINGE (ML) INJECTION EVERY 12 HOURS SCHEDULED
Status: CANCELLED | OUTPATIENT
Start: 2023-10-10

## 2023-10-10 RX ORDER — ASPIRIN 81 MG/1
81 TABLET ORAL DAILY
Status: CANCELLED | OUTPATIENT
Start: 2023-10-11

## 2023-10-10 RX ORDER — SODIUM CHLORIDE 0.9 % (FLUSH) 0.9 %
10 SYRINGE (ML) INJECTION AS NEEDED
Status: CANCELLED | OUTPATIENT
Start: 2023-10-10

## 2023-10-11 ENCOUNTER — TELEPHONE (OUTPATIENT)
Dept: INTERNAL MEDICINE | Facility: CLINIC | Age: 68
End: 2023-10-11

## 2023-10-11 NOTE — TELEPHONE ENCOUNTER
----- Message from NELLIE Cabral sent at 10/9/2023  2:07 PM EDT -----  MRI of the brain shows fluid in left ear, but is otherwise within acceptable limits for her age

## 2023-10-11 NOTE — TELEPHONE ENCOUNTER
----- Message from NELLIE Cabral sent at 10/8/2023  8:39 PM EDT -----  Echo shows low normal ejection fraction (heart function).  I see that she is now following with Cards.  Keep all appointments as scheduled

## 2023-10-11 NOTE — TELEPHONE ENCOUNTER
L/m HUB TO RELAY   Eloisa Collins, NELLIE Rodríguez, Candace MURO, MA  MRI of the brain shows fluid in left ear, but is otherwise within acceptable limits for her age

## 2023-10-11 NOTE — TELEPHONE ENCOUNTER
L/M HUB TO RELAY   -- Message from NELLIE Cabral sent at 10/8/2023  8:39 PM EDT -----  Echo shows low normal ejection fraction (heart function).  I see that she is now following with Cards.  Keep all appointments as scheduled

## 2023-10-11 NOTE — TELEPHONE ENCOUNTER
"Name: Jyoti Puga \"Elsa\"  Relationship: Self  Best Callback Number: [phone]  HUB PROVIDED THE RELAY MESSAGE FROM THE OFFICE  PATIENT UNDERSTOOD  ADDITIONAL INFORMATION:  PATIENT STATES THAT SHE IS HAVING A HEART CATHETER TEST DONE 10/13/2023  "

## 2023-10-13 ENCOUNTER — TELEPHONE (OUTPATIENT)
Dept: INTERNAL MEDICINE | Facility: CLINIC | Age: 68
End: 2023-10-13
Payer: MEDICARE

## 2023-10-13 ENCOUNTER — HOSPITAL ENCOUNTER (OUTPATIENT)
Facility: HOSPITAL | Age: 68
Setting detail: HOSPITAL OUTPATIENT SURGERY
Discharge: HOME | End: 2023-10-13
Attending: INTERNAL MEDICINE | Admitting: INTERNAL MEDICINE
Payer: MEDICARE

## 2023-10-13 VITALS
SYSTOLIC BLOOD PRESSURE: 147 MMHG | OXYGEN SATURATION: 97 % | RESPIRATION RATE: 16 BRPM | HEART RATE: 74 BPM | DIASTOLIC BLOOD PRESSURE: 95 MMHG

## 2023-10-13 DIAGNOSIS — R94.39 ABNORMAL STRESS TEST: ICD-10-CM

## 2023-10-13 DIAGNOSIS — I20.0 UNSTABLE ANGINA: ICD-10-CM

## 2023-10-13 DIAGNOSIS — Z00.00 HEALTHCARE MAINTENANCE: ICD-10-CM

## 2023-10-13 LAB
ALBUMIN SERPL-MCNC: 4 G/DL (ref 3.5–5.2)
ALBUMIN/GLOB SERPL: 1.9 G/DL
ALP SERPL-CCNC: 35 U/L (ref 39–117)
ALT SERPL W P-5'-P-CCNC: 17 U/L (ref 1–33)
ANION GAP SERPL CALCULATED.3IONS-SCNC: 5 MMOL/L (ref 5–15)
AST SERPL-CCNC: 29 U/L (ref 1–32)
BASOPHILS # BLD AUTO: 0.04 10*3/MM3 (ref 0–0.2)
BASOPHILS NFR BLD AUTO: 0.7 % (ref 0–1.5)
BILIRUB SERPL-MCNC: 0.5 MG/DL (ref 0–1.2)
BUN SERPL-MCNC: 12 MG/DL (ref 8–23)
BUN/CREAT SERPL: 20 (ref 7–25)
CALCIUM SPEC-SCNC: 8.7 MG/DL (ref 8.6–10.5)
CHLORIDE SERPL-SCNC: 102 MMOL/L (ref 98–107)
CHOLEST SERPL-MCNC: 103 MG/DL (ref 0–200)
CO2 SERPL-SCNC: 32 MMOL/L (ref 22–29)
CREAT SERPL-MCNC: 0.6 MG/DL (ref 0.57–1)
DEPRECATED RDW RBC AUTO: 41.9 FL (ref 37–54)
EGFRCR SERPLBLD CKD-EPI 2021: 99.1 ML/MIN/1.73
EOSINOPHIL # BLD AUTO: 0.09 10*3/MM3 (ref 0–0.4)
EOSINOPHIL NFR BLD AUTO: 1.7 % (ref 0.3–6.2)
ERYTHROCYTE [DISTWIDTH] IN BLOOD BY AUTOMATED COUNT: 12.7 % (ref 12.3–15.4)
GLOBULIN UR ELPH-MCNC: 2.1 GM/DL
GLUCOSE SERPL-MCNC: 105 MG/DL (ref 65–99)
HBA1C MFR BLD: 5.6 % (ref 4.8–5.6)
HCT VFR BLD AUTO: 38.7 % (ref 34–46.6)
HDLC SERPL-MCNC: 50 MG/DL (ref 40–60)
HGB BLD-MCNC: 13.1 G/DL (ref 12–15.9)
IMM GRANULOCYTES # BLD AUTO: 0.01 10*3/MM3 (ref 0–0.05)
IMM GRANULOCYTES NFR BLD AUTO: 0.2 % (ref 0–0.5)
LDLC SERPL CALC-MCNC: 39 MG/DL (ref 0–100)
LDLC/HDLC SERPL: 0.8 {RATIO}
LYMPHOCYTES # BLD AUTO: 1.65 10*3/MM3 (ref 0.7–3.1)
LYMPHOCYTES NFR BLD AUTO: 30.6 % (ref 19.6–45.3)
MCH RBC QN AUTO: 31.2 PG (ref 26.6–33)
MCHC RBC AUTO-ENTMCNC: 33.9 G/DL (ref 31.5–35.7)
MCV RBC AUTO: 92.1 FL (ref 79–97)
MONOCYTES # BLD AUTO: 0.39 10*3/MM3 (ref 0.1–0.9)
MONOCYTES NFR BLD AUTO: 7.2 % (ref 5–12)
NEUTROPHILS NFR BLD AUTO: 3.22 10*3/MM3 (ref 1.7–7)
NEUTROPHILS NFR BLD AUTO: 59.6 % (ref 42.7–76)
NRBC BLD AUTO-RTO: 0 /100 WBC (ref 0–0.2)
PLATELET # BLD AUTO: 196 10*3/MM3 (ref 140–450)
PMV BLD AUTO: 11.6 FL (ref 6–12)
POTASSIUM SERPL-SCNC: 4.1 MMOL/L (ref 3.5–5.2)
PROT SERPL-MCNC: 6.1 G/DL (ref 6–8.5)
RBC # BLD AUTO: 4.2 10*6/MM3 (ref 3.77–5.28)
SODIUM SERPL-SCNC: 139 MMOL/L (ref 136–145)
TRIGL SERPL-MCNC: 66 MG/DL (ref 0–150)
VLDLC SERPL-MCNC: 14 MG/DL (ref 5–40)
WBC NRBC COR # BLD: 5.4 10*3/MM3 (ref 3.4–10.8)

## 2023-10-13 PROCEDURE — C1894 INTRO/SHEATH, NON-LASER: HCPCS | Performed by: INTERNAL MEDICINE

## 2023-10-13 PROCEDURE — 80053 COMPREHEN METABOLIC PANEL: CPT

## 2023-10-13 PROCEDURE — 85025 COMPLETE CBC W/AUTO DIFF WBC: CPT

## 2023-10-13 PROCEDURE — 80061 LIPID PANEL: CPT

## 2023-10-13 PROCEDURE — 93458 L HRT ARTERY/VENTRICLE ANGIO: CPT | Performed by: INTERNAL MEDICINE

## 2023-10-13 PROCEDURE — 25510000001 IOPAMIDOL PER 1 ML: Performed by: INTERNAL MEDICINE

## 2023-10-13 PROCEDURE — 83036 HEMOGLOBIN GLYCOSYLATED A1C: CPT

## 2023-10-13 PROCEDURE — 25810000003 SODIUM CHLORIDE 0.9 % SOLUTION: Performed by: INTERNAL MEDICINE

## 2023-10-13 PROCEDURE — 25010000002 HEPARIN (PORCINE) PER 1000 UNITS: Performed by: INTERNAL MEDICINE

## 2023-10-13 PROCEDURE — 99152 MOD SED SAME PHYS/QHP 5/>YRS: CPT | Performed by: INTERNAL MEDICINE

## 2023-10-13 PROCEDURE — C1769 GUIDE WIRE: HCPCS | Performed by: INTERNAL MEDICINE

## 2023-10-13 PROCEDURE — 25010000002 FENTANYL CITRATE (PF) 50 MCG/ML SOLUTION: Performed by: INTERNAL MEDICINE

## 2023-10-13 PROCEDURE — 25810000003 SODIUM CHLORIDE 0.9 % SOLUTION

## 2023-10-13 PROCEDURE — 25010000002 MIDAZOLAM PER 1 MG: Performed by: INTERNAL MEDICINE

## 2023-10-13 RX ORDER — HEPARIN SODIUM 1000 [USP'U]/ML
INJECTION, SOLUTION INTRAVENOUS; SUBCUTANEOUS
Status: DISCONTINUED | OUTPATIENT
Start: 2023-10-13 | End: 2023-10-13 | Stop reason: HOSPADM

## 2023-10-13 RX ORDER — CHLORPHENIRAMINE MALEATE 4 MG/1
4 TABLET ORAL DAILY
COMMUNITY

## 2023-10-13 RX ORDER — ASPIRIN 81 MG/1
324 TABLET, CHEWABLE ORAL ONCE
Status: COMPLETED | OUTPATIENT
Start: 2023-10-13 | End: 2023-10-13

## 2023-10-13 RX ORDER — ASPIRIN 81 MG/1
81 TABLET ORAL DAILY
Status: DISCONTINUED | OUTPATIENT
Start: 2023-10-14 | End: 2023-10-13 | Stop reason: HOSPADM

## 2023-10-13 RX ORDER — NICARDIPINE HCL-0.9% SOD CHLOR 1 MG/10 ML
SYRINGE (ML) INTRAVENOUS
Status: DISCONTINUED | OUTPATIENT
Start: 2023-10-13 | End: 2023-10-13 | Stop reason: HOSPADM

## 2023-10-13 RX ORDER — SODIUM CHLORIDE 9 MG/ML
40 INJECTION, SOLUTION INTRAVENOUS AS NEEDED
Status: DISCONTINUED | OUTPATIENT
Start: 2023-10-13 | End: 2023-10-13 | Stop reason: HOSPADM

## 2023-10-13 RX ORDER — ACETAMINOPHEN 325 MG/1
650 TABLET ORAL EVERY 4 HOURS PRN
Status: DISCONTINUED | OUTPATIENT
Start: 2023-10-13 | End: 2023-10-13 | Stop reason: HOSPADM

## 2023-10-13 RX ORDER — ASPIRIN 81 MG/1
81 TABLET ORAL DAILY
COMMUNITY
End: 2023-10-13 | Stop reason: HOSPADM

## 2023-10-13 RX ORDER — SODIUM CHLORIDE 0.9 % (FLUSH) 0.9 %
10 SYRINGE (ML) INJECTION AS NEEDED
Status: DISCONTINUED | OUTPATIENT
Start: 2023-10-13 | End: 2023-10-13 | Stop reason: HOSPADM

## 2023-10-13 RX ORDER — LIDOCAINE HYDROCHLORIDE 10 MG/ML
INJECTION, SOLUTION EPIDURAL; INFILTRATION; INTRACAUDAL; PERINEURAL
Status: DISCONTINUED | OUTPATIENT
Start: 2023-10-13 | End: 2023-10-13 | Stop reason: HOSPADM

## 2023-10-13 RX ORDER — SODIUM CHLORIDE 0.9 % (FLUSH) 0.9 %
10 SYRINGE (ML) INJECTION EVERY 12 HOURS SCHEDULED
Status: DISCONTINUED | OUTPATIENT
Start: 2023-10-13 | End: 2023-10-13 | Stop reason: HOSPADM

## 2023-10-13 RX ORDER — FENTANYL CITRATE 50 UG/ML
INJECTION, SOLUTION INTRAMUSCULAR; INTRAVENOUS
Status: DISCONTINUED | OUTPATIENT
Start: 2023-10-13 | End: 2023-10-13 | Stop reason: HOSPADM

## 2023-10-13 RX ORDER — FLUTICASONE FUROATE AND VILANTEROL 100; 25 UG/1; UG/1
1 POWDER RESPIRATORY (INHALATION)
COMMUNITY

## 2023-10-13 RX ORDER — MIDAZOLAM HYDROCHLORIDE 1 MG/ML
INJECTION INTRAMUSCULAR; INTRAVENOUS
Status: DISCONTINUED | OUTPATIENT
Start: 2023-10-13 | End: 2023-10-13 | Stop reason: HOSPADM

## 2023-10-13 RX ADMIN — SODIUM CHLORIDE 306 ML: 9 INJECTION, SOLUTION INTRAVENOUS at 07:17

## 2023-10-13 RX ADMIN — ASPIRIN 324 MG: 81 TABLET, CHEWABLE ORAL at 07:17

## 2023-10-13 NOTE — H&P
[facility] Cardiology  Office visit  Jyoti Puga  1955  [phone]  There is no work phone number on file.    VISIT DATE:  10/9/2023    PCP: Waleska Miller MD  [address]  [facility] [place] [address]    CC:  Precordial pain    Previous cardiac studies and procedures:  2017 stress echocardiogram  The test is negative for anginal symptoms or diagnostic ST segment changes.  Resting echocardiogram showed normal left ventricular systolic function, estimated ejection fraction 60%, mild mitral irritation, mild tricuspid regurg dilatation.  Normal stress echocardiogram, no evidence of dobutamine-induced ischemia.  Normal post stress ejection fraction of 75%.    September 2023  TTE    Left ventricular systolic function is low normal. Calculated left ventricular EF = 47% Left ventricular ejection fraction appears to be 51 - 55%.    Left ventricular diastolic function was normal.  Myocardial perfusion imaging    Nondiagnostic pharmacologic nuclear stress test due to GI attenuation artifact    Left ventricular ejection fraction is normal (Calculated EF = 55%).    Coronary artery calcification noted on CT attenuation correction images.    October 2023 ambulatory ECG monitor, 2-week: NSVT, runs of nonsustained atrial tachycardia.    ASSESSMENT:   Diagnosis Plan   1. Unstable angina  Cardiac Catheterization/Vascular Study    Cardiac Catheterization/Vascular Study    CBC & Differential    Comprehensive Metabolic Panel    CBC & Differential    Comprehensive Metabolic Panel    Obtain Informed Consent in Pre-Op    Oxygen Therapy- Nasal Cannula; Titrate 1-6 LPM Per SpO2; 90 - 95%    NPO Diet NPO Type: Strict NPO    Insert Peripheral IV    Saline Lock & Maintain IV Access    sodium chloride 0.9 % flush 10 mL    sodium chloride 0.9 % flush 10 mL    sodium chloride 0.9 % infusion 40 mL    aspirin chewable tablet 324 mg    aspirin EC tablet 81 mg    sodium chloride 0.9 % bolus 306 mL     Obtain Informed Consent in Pre-Op    NPO Diet NPO Type: Strict NPO    Insert Peripheral IV    Saline Lock & Maintain IV Access      2. Abnormal stress test  Cardiac Catheterization/Vascular Study    Cardiac Catheterization/Vascular Study      3. Healthcare maintenance  Lipid Panel    Hemoglobin A1c    Lipid Panel    Hemoglobin A1c          PLAN:  Unstable angina: New onset persistent angina class II pattern despite 2 antianginal agents.  Interval drop in LV systolic function.  Significant coronary calcifications visualized in the proximal and mid LAD.  Episodes of nonsustained ventricular tachycardia noted on ambulatory ECG monitoring.  Recommending left heart catheterization for more definitive evaluation of coronary anatomy.      Subjective  68-year-old previous smoker with history of hypertension dyslipidemia presenting with exertional chest discomfort.  Initially presented earlier this summer with exertional nausea and diaphoresis as she was mowing her lawn which lasted for 2 to 3 days.  Since that time when she tries to exercise she develops left precordial chest discomfort which will often radiate to her left arm with associated dyspnea.  Relieved with rest.  Class II pattern.  Currently without any rest symptoms.  She is compliant with medical therapy to include calcium channel blockade, beta-blockade, aspirin and statin therapy.  Reviewed previous cardiac evaluation.    PHYSICAL EXAMINATION:    General Appearance:    Alert, cooperative, no distress, appears stated age   Head:    Normocephalic, without obvious abnormality, atraumatic   Eyes:    conjunctiva/corneas clear   Nose:   Nares normal, septum midline, mucosa normal, no drainage   Throat:   Lips, teeth and gums normal   Neck:   Supple, symmetrical, trachea midline, no carotid    bruit or JVD   Lungs:     Clear to auscultation bilaterally, respirations unlabored   Chest Wall:    No tenderness or deformity    Heart:    Regular rate and rhythm, S1 and S2  normal, no murmur, rub   or gallop, normal carotid impulse bilaterally without bruit.   Abdomen:     Soft, non-tender   Extremities:   Extremities normal, atraumatic, no cyanosis or edema   Pulses:   2+ and symmetric all extremities   Skin:   Skin color, texture, turgor normal, no rashes or lesions       Diagnostic Data:  Procedures  Lab Results   Component Value Date    TRIG 111 06/12/2023    HDL 69 (H) 06/12/2023     Lab Results   Component Value Date    GLUCOSE 120 (H) 08/27/2023    BUN 14 08/27/2023    CREATININE 0.57 08/27/2023     08/27/2023    K 4.0 08/27/2023     08/27/2023    CO2 28.0 08/27/2023     Lab Results   Component Value Date    HGBA1C 5.80 (H) 06/12/2023     Lab Results   Component Value Date    WBC 5.40 10/13/2023    HGB 13.1 10/13/2023    HCT 38.7 10/13/2023     10/13/2023       Allergies  Allergies   Allergen Reactions    Codeine Nausea Only       Current Medications    Current Facility-Administered Medications:     [COMPLETED] aspirin chewable tablet 324 mg, 324 mg, Oral, Once, 324 mg at 10/13/23 0717 **AND** [START ON 10/14/2023] aspirin EC tablet 81 mg, 81 mg, Oral, Daily, Scotty, Fozia, APRN    sodium chloride 0.9 % bolus 306 mL, 3 mL/kg, Intravenous, Once Over 1 Hour, Scotty, Fozia, APRN, Last Rate: 306 mL/hr at 10/13/23 0717, 306 mL at 10/13/23 0717    sodium chloride 0.9 % flush 10 mL, 10 mL, Intravenous, Q12H, Scotty, Fozia, APRN    sodium chloride 0.9 % flush 10 mL, 10 mL, Intravenous, PRN, Scotty, Fozia, APRN    sodium chloride 0.9 % infusion 40 mL, 40 mL, Intravenous, PRN, Scotty, Fozia, APRN          ROS  Review of Systems   Cardiovascular:  Positive for chest pain.         SOCIAL HX  Social History     Socioeconomic History    Marital status:     Number of children: 0   Tobacco Use    Smoking status: Former     Packs/day: 1.50     Years: 37.00     Additional pack years: 0.00     Total pack years: 55.50     Types: Cigarettes     Start date:  1975     Quit date: 2012     Years since quittin.7    Smokeless tobacco: Never    Tobacco comments:     16-60   Vaping Use    Vaping Use: Former    Substances: Nicotine    Devices: Disposable   Substance and Sexual Activity    Alcohol use: No    Drug use: No    Sexual activity: Not Currently       FAMILY HX  Family History   Problem Relation Age of Onset    Hypertension Mother     Colon polyps Mother     Arthritis Mother         joints    Cancer Mother         Basil cell and Squamous    Heart disease Mother         A-Fib    Hyperlipidemia Mother     Diabetes Father     Alcohol abuse Father             Diabetes Sister     Hypertension Sister     Hyperlipidemia Sister     Diabetes Brother     Hypertension Brother     Melanoma Brother         started on side and has gone to his brain    Colon polyps Brother     Cancer Brother         Melanoma/Passes away from it    Heart disease Brother         A-Fib    Hyperlipidemia Brother     Diabetes Paternal Aunt     Diabetes Other     Diabetes Other     Colon cancer Neg Hx     Breast cancer Neg Hx              González Richard III, MD, FACC

## 2023-11-28 ENCOUNTER — TRANSCRIBE ORDERS (OUTPATIENT)
Dept: ADMINISTRATIVE | Facility: HOSPITAL | Age: 68
End: 2023-11-28
Payer: MEDICARE

## 2023-11-28 DIAGNOSIS — Z12.31 VISIT FOR SCREENING MAMMOGRAM: Primary | ICD-10-CM

## 2023-12-11 DIAGNOSIS — E78.00 PURE HYPERCHOLESTEROLEMIA: ICD-10-CM

## 2023-12-11 RX ORDER — FENOFIBRATE 160 MG/1
TABLET ORAL
Qty: 90 TABLET | Refills: 0 | Status: SHIPPED | OUTPATIENT
Start: 2023-12-11

## 2024-01-17 ENCOUNTER — OFFICE VISIT (OUTPATIENT)
Dept: CARDIOLOGY | Facility: CLINIC | Age: 69
End: 2024-01-17
Payer: MEDICARE

## 2024-01-17 ENCOUNTER — HOSPITAL ENCOUNTER (OUTPATIENT)
Dept: MAMMOGRAPHY | Facility: HOSPITAL | Age: 69
Discharge: HOME | End: 2024-01-17
Admitting: INTERNAL MEDICINE
Payer: MEDICARE

## 2024-01-17 VITALS
BODY MASS INDEX: 35.47 KG/M2 | HEART RATE: 70 BPM | DIASTOLIC BLOOD PRESSURE: 70 MMHG | WEIGHT: 226 LBS | SYSTOLIC BLOOD PRESSURE: 126 MMHG | OXYGEN SATURATION: 93 % | HEIGHT: 67 IN

## 2024-01-17 DIAGNOSIS — R07.89 CHEST TIGHTNESS: ICD-10-CM

## 2024-01-17 DIAGNOSIS — Z12.31 VISIT FOR SCREENING MAMMOGRAM: ICD-10-CM

## 2024-01-17 DIAGNOSIS — I44.7 LEFT BUNDLE BRANCH BLOCK: ICD-10-CM

## 2024-01-17 DIAGNOSIS — I10 PRIMARY HYPERTENSION: Primary | ICD-10-CM

## 2024-01-17 DIAGNOSIS — R00.2 PALPITATIONS: ICD-10-CM

## 2024-01-17 DIAGNOSIS — E78.00 PURE HYPERCHOLESTEROLEMIA: Chronic | ICD-10-CM

## 2024-01-17 PROCEDURE — 99213 OFFICE O/P EST LOW 20 MIN: CPT | Performed by: INTERNAL MEDICINE

## 2024-01-17 PROCEDURE — 3074F SYST BP LT 130 MM HG: CPT | Performed by: INTERNAL MEDICINE

## 2024-01-17 PROCEDURE — 3078F DIAST BP <80 MM HG: CPT | Performed by: INTERNAL MEDICINE

## 2024-01-17 PROCEDURE — 77063 BREAST TOMOSYNTHESIS BI: CPT

## 2024-01-17 PROCEDURE — 77067 SCR MAMMO BI INCL CAD: CPT

## 2024-01-17 RX ORDER — METOPROLOL SUCCINATE 25 MG/1
25 TABLET, EXTENDED RELEASE ORAL DAILY
Qty: 90 TABLET | Refills: 3 | Status: SHIPPED | OUTPATIENT
Start: 2024-01-17

## 2024-01-17 RX ORDER — ROSUVASTATIN CALCIUM 20 MG/1
20 TABLET, COATED ORAL DAILY
Qty: 90 TABLET | Refills: 3 | Status: SHIPPED | OUTPATIENT
Start: 2024-01-17

## 2024-01-17 RX ORDER — AMLODIPINE BESYLATE 5 MG/1
5 TABLET ORAL DAILY
Qty: 90 TABLET | Refills: 3 | Status: SHIPPED | OUTPATIENT
Start: 2024-01-17

## 2024-01-17 NOTE — PROGRESS NOTES
[facility] Cardiology  Office visit  Jyoti Puga  1955  [phone]  There is no work phone number on file.    VISIT DATE:  1/17/2024    PCP: Waleska Miller MD  [address]  [facility] [address]    CC:  Chief Complaint   Patient presents with    Dizziness    Shortness of Breath     All the time         Previous cardiac studies and procedures:  2017 stress echocardiogram  The test is negative for anginal symptoms or diagnostic ST segment changes.  Resting echocardiogram showed normal left ventricular systolic function, estimated ejection fraction 60%, mild mitral irritation, mild tricuspid regurg dilatation.  Normal stress echocardiogram, no evidence of dobutamine-induced ischemia.  Normal post stress ejection fraction of 75%.    September 2023  TTE    Left ventricular systolic function is low normal. Calculated left ventricular EF = 47% Left ventricular ejection fraction appears to be 51 - 55%.    Left ventricular diastolic function was normal.  Myocardial perfusion imaging    Nondiagnostic pharmacologic nuclear stress test due to GI attenuation artifact    Left ventricular ejection fraction is normal (Calculated EF = 55%).    Coronary artery calcification noted on CT attenuation correction images.    October 2023   ambulatory ECG monitor, 2-week: NSVT, runs of nonsustained atrial tachycardia.  LHC:  Luminal irregularities only visualized in the proximal and mid portions of the major epicardial vessels.  No aortic stenosis  Normal LVEDP    ASSESSMENT:   Diagnosis Plan   1. Primary hypertension        2. Pure hypercholesterolemia            PLAN:  Hypertension: Goal less than 130/80 mmHg.  Currently well controlled, continue current medical therapy.    Hyperlipidemia: Goal LDL less than 70.  Continue high intensity statin therapy, rosuvastatin 20 mg p.o. daily in addition to fenofibrate 160 mg p.o. daily.    Subjective  Interval assessment: Currently chest  "pain..  Denies dyspnea or palpitations.  Blood pressures running less than 130/80 mmHg.  She is compliant with medical therapy.    Initial: 68-year-old previous smoker with history of hypertension dyslipidemia presenting with exertional chest discomfort.  Initially presented earlier this summer with exertional nausea and diaphoresis as she was mowing her lawn which lasted for 2 to 3 days.  Since that time when she tries to exercise she develops left precordial chest discomfort which will often radiate to her left arm with associated dyspnea.  Relieved with rest.  Class II pattern.  Currently without any rest symptoms.  She is compliant with medical therapy to include calcium channel blockade, beta-blockade, aspirin and statin therapy.  Reviewed previous cardiac evaluation.    PHYSICAL EXAMINATION:  Vitals:    01/17/24 1329   BP: 126/70   BP Location: Left arm   Patient Position: Sitting   Pulse: 70   SpO2: 93%   Weight: 103 kg (226 lb)   Height: 170.2 cm (67\")     General Appearance:    Alert, cooperative, no distress, appears stated age   Head:    Normocephalic, without obvious abnormality, atraumatic   Eyes:    conjunctiva/corneas clear   Nose:   Nares normal, septum midline, mucosa normal, no drainage   Throat:   Lips, teeth and gums normal   Neck:   Supple, symmetrical, trachea midline, no carotid    bruit or JVD   Lungs:     Clear to auscultation bilaterally, respirations unlabored   Chest Wall:    No tenderness or deformity    Heart:    Regular rate and rhythm, S1 and S2 normal, 2/6 early peaking systolic murmur right upper sternal border, no rub   or gallop, normal carotid impulse bilaterally without bruit.   Abdomen:     Soft, non-tender   Extremities:   Extremities normal, atraumatic, no cyanosis or edema   Pulses:   2+ and symmetric all extremities   Skin:   Skin color, texture, turgor normal, no rashes or lesions       Diagnostic Data:  Procedures  Lab Results   Component Value Date    TRIG 66 10/13/2023 "    HDL 50 10/13/2023     Lab Results   Component Value Date    GLUCOSE 105 (H) 10/13/2023    BUN 12 10/13/2023    CREATININE 0.60 10/13/2023     10/13/2023    K 4.1 10/13/2023     10/13/2023    CO2 32.0 (H) 10/13/2023     Lab Results   Component Value Date    HGBA1C 5.60 10/13/2023     Lab Results   Component Value Date    WBC 5.40 10/13/2023    HGB 13.1 10/13/2023    HCT 38.7 10/13/2023     10/13/2023       Allergies  Allergies   Allergen Reactions    Codeine Nausea Only       Current Medications    Current Outpatient Medications:     acetaminophen (TYLENOL) 650 MG 8 hr tablet, Take 1 tablet by mouth Daily As Needed., Disp: , Rfl:     amLODIPine (NORVASC) 5 MG tablet, Take 1 tablet by mouth once daily, Disp: 30 tablet, Rfl: 3    chlorpheniramine (CHLOR-TRIMETON) 4 MG tablet, Take 1 tablet by mouth Daily., Disp: , Rfl:     Coenzyme Q10-Vitamin E (QUNOL ULTRA COQ10 PO), Take 100 mg by mouth Daily., Disp: , Rfl:     diclofenac (VOLTAREN) 1 % gel gel, Apply 4 g topically to the appropriate area as directed 3 (Three) Times a Day. Apply to foot, Disp: 100 g, Rfl: 11    fenofibrate 160 MG tablet, Take 1 tablet by mouth once daily, Disp: 90 tablet, Rfl: 0    ferrous sulfate 325 (65 FE) MG tablet, Take 1 tablet by mouth Daily With Breakfast., Disp: , Rfl:     fluticasone (FLONASE) 50 MCG/ACT nasal spray, 2 sprays into the nostril(s) as directed by provider Daily., Disp: 48 g, Rfl: 3    gabapentin (NEURONTIN) 600 MG tablet, TAKE 1 TABLET BY MOUTH TWICE A DAY, Disp: 180 tablet, Rfl: 1    LYSINE PO, Take  by mouth Daily., Disp: , Rfl:     metoprolol succinate XL (TOPROL-XL) 25 MG 24 hr tablet, Take 1 tablet by mouth Daily., Disp: 30 tablet, Rfl: 3    RABEprazole (ACIPHEX) 20 MG EC tablet, Take 1 tablet by mouth 2 (Two) Times a Day., Disp: 180 tablet, Rfl: 3    rosuvastatin (CRESTOR) 20 MG tablet, Take 1 tablet by mouth Daily., Disp: 30 tablet, Rfl: 3    Cholecalciferol (vitamin D3) 125 MCG (5000 UT) capsule  capsule, Take 1 capsule by mouth Every 7 (Seven) Days. (Patient not taking: Reported on 2024), Disp: , Rfl:     Fluticasone Furoate-Vilanterol (Breo Ellipta) 100-25 MCG/ACT aerosol powder , Inhale 1 puff Daily., Disp: , Rfl:     levalbuterol (Xopenex) 1.25 MG/3ML nebulizer solution, Take 1 ampule by nebulization Every 4 (Four) Hours As Needed for Wheezing. (Patient not taking: Reported on 2024), Disp: 90 mL, Rfl: 12          ROS  ROS      SOCIAL HX  Social History     Socioeconomic History    Marital status:     Number of children: 0   Tobacco Use    Smoking status: Former     Packs/day: 1.50     Years: 37.00     Additional pack years: 0.00     Total pack years: 55.50     Types: Cigarettes     Start date: 1975     Quit date: 2012     Years since quittin.0     Passive exposure: Never    Smokeless tobacco: Never    Tobacco comments:     16-60   Vaping Use    Vaping Use: Former    Substances: Nicotine    Devices: Disposable   Substance and Sexual Activity    Alcohol use: No    Drug use: No    Sexual activity: Not Currently       FAMILY HX  Family History   Problem Relation Age of Onset    Hypertension Mother     Colon polyps Mother     Arthritis Mother         joints    Cancer Mother         Basil cell and Squamous    Heart disease Mother         A-Fib    Hyperlipidemia Mother     Diabetes Father     Alcohol abuse Father             Diabetes Sister     Hypertension Sister     Hyperlipidemia Sister     Diabetes Brother     Hypertension Brother     Melanoma Brother         started on side and has gone to his brain    Colon polyps Brother     Cancer Brother         Melanoma/Passes away from it    Heart disease Brother         A-Fib    Hyperlipidemia Brother     Diabetes Paternal Aunt     Diabetes Other     Diabetes Other     Colon cancer Neg Hx     Breast cancer Neg Hx     Ovarian cancer Neg Hx              González Richard III, MD, FACC       Tranexamic Acid Pregnancy And Lactation Text: It is unknown if this medication is safe during pregnancy or breast feeding.

## 2024-01-26 DIAGNOSIS — G62.9 NEUROPATHY: ICD-10-CM

## 2024-01-26 RX ORDER — GABAPENTIN 600 MG/1
TABLET ORAL
Qty: 180 TABLET | Refills: 0 | Status: SHIPPED | OUTPATIENT
Start: 2024-01-26

## 2024-02-08 ENCOUNTER — OFFICE VISIT (OUTPATIENT)
Dept: INTERNAL MEDICINE | Facility: CLINIC | Age: 69
End: 2024-02-08
Payer: MEDICARE

## 2024-02-08 VITALS
TEMPERATURE: 97.1 F | DIASTOLIC BLOOD PRESSURE: 68 MMHG | HEIGHT: 67 IN | BODY MASS INDEX: 34.84 KG/M2 | WEIGHT: 222 LBS | HEART RATE: 68 BPM | SYSTOLIC BLOOD PRESSURE: 118 MMHG | OXYGEN SATURATION: 96 %

## 2024-02-08 DIAGNOSIS — I10 PRIMARY HYPERTENSION: Chronic | ICD-10-CM

## 2024-02-08 DIAGNOSIS — F32.0 CURRENT MILD EPISODE OF MAJOR DEPRESSIVE DISORDER WITHOUT PRIOR EPISODE: Chronic | ICD-10-CM

## 2024-02-08 DIAGNOSIS — E78.00 PURE HYPERCHOLESTEROLEMIA: Primary | Chronic | ICD-10-CM

## 2024-02-08 DIAGNOSIS — G60.9 IDIOPATHIC PERIPHERAL NEUROPATHY: Chronic | ICD-10-CM

## 2024-02-08 DIAGNOSIS — R42 VERTIGO: ICD-10-CM

## 2024-02-08 RX ORDER — BUPROPION HYDROCHLORIDE 150 MG/1
150 TABLET ORAL EVERY MORNING
Qty: 30 TABLET | Refills: 5 | Status: SHIPPED | OUTPATIENT
Start: 2024-02-08

## 2024-02-08 RX ORDER — RABEPRAZOLE SODIUM 20 MG/1
20 TABLET, DELAYED RELEASE ORAL 2 TIMES DAILY
Qty: 180 TABLET | Refills: 3 | Status: SHIPPED | OUTPATIENT
Start: 2024-02-08

## 2024-02-08 RX ORDER — DIPHENHYDRAMINE HCL 50 MG
50 CAPSULE ORAL NIGHTLY
COMMUNITY

## 2024-02-08 NOTE — PROGRESS NOTES
Chief Complaint  Peripheral Neuropathy (F/u for medication refill), Dizziness (She is still having the lightheadedness.), Hyperlipidemia, and Hypertension    Subjective          Jyoti Puga presents to [facility]  History of Present Illness    HTN-she is taking amlodipine and metoprolol regularly.  Hasn't checked recently, but was good when she saw cardiology last month    CAD - she did see Dr Richard last month heart cath did not show any significant blockages and she is no longer on aspirin but is on Crestor which she is taking regularly    SVT-better, still will occasionally feel a palpitation but not as frequently as before    Dizziness - she feels off balance, not really like she is going to pass out or lightheaded but more like she might stagger.  Worse if she looks up or her head.  May occur daily, or a few times a day  No sent falls    Depression - has felt a little down and feels lack of purpose.  Would like to work some can be difficult to find a position    GERD-she is taking Aciphex regularly.  She has been taking MiraLAX regularly which has helped quite a bit with bowel movements    Neuropathy/bilateral foot pain-she is using gabapentin twice daily and this does seem to help some.  She also had some new inserts which have helped      Current Outpatient Medications:     acetaminophen (TYLENOL) 650 MG 8 hr tablet, Take 1 tablet by mouth Daily As Needed., Disp: , Rfl:     amLODIPine (NORVASC) 5 MG tablet, Take 1 tablet by mouth Daily., Disp: 90 tablet, Rfl: 3    Cholecalciferol (vitamin D3) 125 MCG (5000 UT) capsule capsule, Take 1 capsule by mouth Every 7 (Seven) Days., Disp: , Rfl:     Coenzyme Q10-Vitamin E (QUNOL ULTRA COQ10 PO), Take 100 mg by mouth Daily., Disp: , Rfl:     diclofenac (VOLTAREN) 1 % gel gel, Apply 4 g topically to the appropriate area as directed 3 (Three) Times a Day. Apply to foot, Disp: 100 g, Rfl: 11    diphenhydrAMINE (BENADRYL) 50 MG capsule,  "Take 1 capsule by mouth Every Night., Disp: , Rfl:     ferrous sulfate 325 (65 FE) MG tablet, Take 1 tablet by mouth Daily With Breakfast., Disp: , Rfl:     fluticasone (FLONASE) 50 MCG/ACT nasal spray, 2 sprays into the nostril(s) as directed by provider Daily., Disp: 48 g, Rfl: 3    gabapentin (NEURONTIN) 600 MG tablet, Take 1 tablet by mouth twice daily, Disp: 180 tablet, Rfl: 0    levalbuterol (Xopenex) 1.25 MG/3ML nebulizer solution, Take 1 ampule by nebulization Every 4 (Four) Hours As Needed for Wheezing., Disp: 90 mL, Rfl: 12    LYSINE PO, Take  by mouth Daily., Disp: , Rfl:     metoprolol succinate XL (TOPROL-XL) 25 MG 24 hr tablet, Take 1 tablet by mouth Daily., Disp: 90 tablet, Rfl: 3    RABEprazole (ACIPHEX) 20 MG EC tablet, Take 1 tablet by mouth 2 (Two) Times a Day., Disp: 180 tablet, Rfl: 3    rosuvastatin (CRESTOR) 20 MG tablet, Take 1 tablet by mouth Daily., Disp: 90 tablet, Rfl: 3    buPROPion XL (Wellbutrin XL) 150 MG 24 hr tablet, Take 1 tablet by mouth Every Morning., Disp: 30 tablet, Rfl: 5   The following portions of the patient's history were reviewed and updated as appropriate: allergies, current medications, past family history, past medical history, past social history, past surgical history and problem list.     Objective   Vital Signs:   /68 (BP Location: Right arm, Patient Position: Sitting)   Pulse 68   Temp 97.1 °F (36.2 °C) (Infrared)   Ht 170.2 cm (67\")   Wt 101 kg (222 lb)   SpO2 96%   BMI 34.77 kg/m²       Physical exam  Constitutional: oriented to person, place, and time.  well-developed and well-nourished. No distress.   HENT:   Head: Normocephalic and atraumatic.   Ears: Right slightly scarred.  Left TM normal.  No cerumen, no erythema  Eyes: Conjunctivae and EOM are normal.  No nystagmus  Neck: Carotids normal  Cardiovascular: Normal rate, regular rhythm and normal heart sounds.  Exam reveals no gallop and no friction rub.    No murmur heard.  Pulmonary/Chest: " Effort normal and breath sounds normal. No respiratory distress.   no wheezes.   Neurological:  alert and oriented to person, place, and time.   Skin: Skin is warm and dry. not diaphoretic.   Psychiatric:  normal mood and affect. behavior is normal. Judgment and thought content normal.      Result Review :   The following data was reviewed by: Waleska Miller MD on 02/08/2024:  CMP          8/7/2023    10:45 8/27/2023    17:42 10/13/2023    06:59   CMP   Glucose 92  120  105    BUN 14  14  12    Creatinine 0.60  0.57  0.60    EGFR 99.8  101.0  99.1    Sodium 143  141  139    Potassium 4.1  4.0  4.1    Chloride 105  100  102    Calcium 8.3  10.4  8.7    Total Protein 6.0  7.3  6.1    Albumin 4.0  4.5  4.0    Globulin 2.0  2.8  2.1    Total Bilirubin 0.7  0.8  0.5    Alkaline Phosphatase 41  43  35    AST (SGOT) 21  21  29    ALT (SGPT) 11  13  17    Albumin/Globulin Ratio 2.0  1.6  1.9    BUN/Creatinine Ratio 23.3  24.6  20.0    Anion Gap 9.6  13.0  5.0      CBC          8/7/2023    10:45 8/27/2023    15:28 10/13/2023    06:59   CBC   WBC 6.66  7.97  5.40    RBC 4.63  5.16  4.20    Hemoglobin 14.1  15.7  13.1    Hematocrit 41.2  47.0  38.7    MCV 89.0  91.1  92.1    MCH 30.5  30.4  31.2    MCHC 34.2  33.4  33.9    RDW 12.3  12.7  12.7    Platelets 214  257  196      Lipid Panel          6/12/2023    10:03 10/13/2023    06:59   Lipid Panel   Total Cholesterol 190  103    Triglycerides 111  66    HDL Cholesterol 69  50    VLDL Cholesterol 20  14    LDL Cholesterol  101  39    LDL/HDL Ratio 1.43  0.80      TSH          6/12/2023    10:03   TSH   TSH 2.690      Lab Results   Component Value Date    IXGZ78IV 56.7 06/12/2023    QDVFWPDD22 1,030 (H) 12/19/2018               Assessment and Plan    Diagnoses and all orders for this visit:    1. Pure hypercholesterolemia (Primary)-FLP looked good in 10/23 on Crestor and fenofibrate.  We will stop the fenofibrate and recheck in 3 months and see how levels look.    2. Primary  hypertension-good control.  She will start checking at home    3. Idiopathic peripheral neuropathy-stable with gabapentin.  She has signed controlled substance contract, [place]scar appropriate    4. Vertigo-we will see if vestibular therapy will help  -     Ambulatory Referral to Physical Therapy Vestibular    5. Current mild episode of major depressive disorder without prior episode-we will try low-dose Wellbutrin and see how she does.  Side effects reviewed.  She will let me know how she does  -     buPROPion XL (Wellbutrin XL) 150 MG 24 hr tablet; Take 1 tablet by mouth Every Morning.  Dispense: 30 tablet; Refill: 5    Other orders  -     RABEprazole (ACIPHEX) 20 MG EC tablet; Take 1 tablet by mouth 2 (Two) Times a Day.  Dispense: 180 tablet; Refill: 3        Follow Up   Return in about 2 months (around 4/8/2024).  Patient was given instructions and counseling regarding her condition or for health maintenance advice. Please see specific information pulled into the AVS if appropriate.

## 2024-02-21 ENCOUNTER — TREATMENT (OUTPATIENT)
Dept: PHYSICAL THERAPY | Facility: CLINIC | Age: 69
End: 2024-02-21
Payer: MEDICARE

## 2024-02-21 DIAGNOSIS — R42 DIZZINESS: ICD-10-CM

## 2024-02-21 DIAGNOSIS — R26.89 BALANCE PROBLEM: Primary | ICD-10-CM

## 2024-02-21 PROCEDURE — 97112 NEUROMUSCULAR REEDUCATION: CPT | Performed by: PHYSICAL THERAPIST

## 2024-02-21 PROCEDURE — 97162 PT EVAL MOD COMPLEX 30 MIN: CPT | Performed by: PHYSICAL THERAPIST

## 2024-02-21 NOTE — PROGRESS NOTES
"    Physical Therapy Initial Evaluation and Plan of Care  [address] [facility] [address]  [place] [address]    Patient: Jyoti Puga   : 1955  Diagnosis/ICD-10 Code:  No primary diagnosis found.  Referring practitioner: Waleska Miller MD  Date of Initial Visit: 2024  Today's Date: 2024  Patient seen for Visit count could not be calculated. Make sure you are using a visit which is associated with an episode. session         Visit Diagnoses:  No diagnosis found.      Subjective Questionnaire: ABC: 88      Subjective Evaluation    History of Present Illness  Mechanism of injury: Pt is a 68 year old female presenting to the clinic with dizziness and balance issues. Last year, she went to mow her lawn and she had dizziness and lightheadedness. She was \"down\" for 3 days. She was so dizzy she could hardly walk and was vomiting as well. She had a lot of tests done and it was determined that she may have had a virus. She has started noticing when she goes for walks she veers side to side. She also gets some dizziness when she looks up. Her dizziness typically lasts less than a minute. She only gets dizzy if she is standing . She does not really notice it when she is sitting or laying down. She has meclizine she can take as needed but she does not feel like it helps. She feels like this issue has gotten better recently. She rates the intensity of her dizziness a 2/10.     Quality of life: excellent    Pain  Aggravating factors: repetitive movement, standing and ambulation  Progression: improved    Patient Goals  Patient goals for therapy: independence with ADLs/IADLs, return to sport/leisure activities and improved balance             Objective          Active Range of Motion   Cervical/Thoracic Spine   Cervical    Flexion: 40 degrees   Extension: 43 degrees   Left rotation: 65 degrees   Right rotation: 73 degrees     Functional Assessment     Comments  Double leg balance EC: 2 sec    Tandem " balance R in front: 3 sec  Tandem balance L in front: 5 sec        See vestibular flowsheet for further objective info    Assessment & Plan       Assessment  Impairments: abnormal coordination, abnormal gait, abnormal muscle tone, abnormal or restricted ROM, activity intolerance, impaired balance, impaired physical strength, lacks appropriate home exercise program and pain with function   Functional limitations: walking and unable to perform repetitive tasks   Assessment details: Patient is a 68-year-old female presenting to the clinic with balance issues and dizziness.  She demonstrates decreased balance.  She demonstrates negative Bloomery-Hallpike bilaterally and normal ocular ROM. She is dizzy when she looks up and feels she does not walk straight. I believe her issues are mostly due to poor balance and inactivity.  Patient would benefit from skilled physical therapy services to address her impairments so she can reach her long-term goals.  Prognosis: good    Goals  Plan Goals: Short term goals:  1. Pt to be independent with her HEP.  2. Pt to perform tandem balance for 30 sec bilaterally to show increased balance in narrow spaces.    Long term goals:  1. Pt to perform single leg balance for 30 sec bilaterally to shoe increased functional balance.  2. Pt to report complete reduction in dizziness with walking and looking up.    Plan  Therapy options: will be seen for skilled therapy services  Planned modality interventions: cryotherapy, dry needling, electrical stimulation/Russian stimulation, TENS, iontophoresis, traction and hydrotherapy  Planned therapy interventions: manual therapy, neuromuscular re-education, postural training, soft tissue mobilization, spinal/joint mobilization, strengthening, stretching, therapeutic activities, home exercise program, gait training, functional ROM exercises, flexibility, fine motor coordination training, body mechanics training, balance/weight-bearing training and abdominal  trunk stabilization  Duration in visits: 1  Duration in weeks: 12  Treatment plan discussed with: patient        History # of Personal Factors and/or Comorbidities: MODERATE (1-2)  Examination of Body System(s): # of elements: MODERATE (3)  Clinical Presentation: STABLE   Clinical Decision Making: MODERATE      Timed:         Manual Therapy:         mins  80037;     Therapeutic Exercise:         mins  40237;     Neuromuscular Ralph:    15    mins  55912;    Therapeutic Activity:          mins  77979;     Gait Training:           mins  19001;     Ultrasound:          mins  03300;    Ionto                                   mins   86113  Self Care                            mins   73649  Canalith Repos         mins 12459      Un-Timed:  Electrical Stimulation:         mins  20753 ( );  Dry Needling          mins self-pay  Traction          mins 73409  Low Eval          Mins  62846  Mod Eval     28     Mins  80159  High Eval                            Mins  31337        Timed Treatment:   15   mins   Total Treatment:     43   mins          PT: Avis Valiente PT   License Number: [id number]  Electronically signed by Avis Valiente PT, 02/21/24, 9:31 AM EST    Certification Period: 2/21/2024 thru 5/20/2024  I certify that the therapy services are furnished while this patient is under my care.  The services outlined above are required by this patient, and will be reviewed every 90 days.         Physician Signature:__________________________________________________    PHYSICIAN: Waleska Miller MD  NPI: [id number]                                      DATE:      Please sign and return via fax to [email or web address] . Thank you, [facility].

## 2024-02-28 ENCOUNTER — TREATMENT (OUTPATIENT)
Dept: PHYSICAL THERAPY | Facility: CLINIC | Age: 69
End: 2024-02-28
Payer: MEDICARE

## 2024-02-28 DIAGNOSIS — R42 DIZZINESS: ICD-10-CM

## 2024-02-28 DIAGNOSIS — R26.89 BALANCE PROBLEM: Primary | ICD-10-CM

## 2024-02-28 PROCEDURE — 97112 NEUROMUSCULAR REEDUCATION: CPT | Performed by: PHYSICAL THERAPIST

## 2024-02-28 PROCEDURE — 97110 THERAPEUTIC EXERCISES: CPT | Performed by: PHYSICAL THERAPIST

## 2024-02-28 NOTE — PROGRESS NOTES
Physical Therapy Daily Treatment Note                [address] [facility] [address] [place] [address]       Patient: Jyoti Puga   : 1955  Diagnosis/ICD-10 Code:  Balance problem [R26.89]  Referring practitioner: Waleska Miller MD  Date of Initial Visit: Type: THERAPY  Noted: 2024  Today's Date: 2024  Patient seen for 2 sessions             Subjective   Jyoti Puga reports: not as dizzy since IE and can tell a difference already in her balance.     Objective   BALANCE-  Able to  tandem stance with no UE assist for 30 sec bilateral.    Unsteady with Romberg EC.    See Exercise, Manual, and Modality Logs for complete treatment.       Assessment/Plan  Pt responding positively to vestibular hypofunction activities and balance strengthening. Progressed VOR to include balance. Pt is having the most difficulty with horizontal gaze stabilization exercises and no symptoms with vertical. She needed small breaks at times to allow symptoms to settle but would happen within seconds showing good recovery.   Progress per Plan of Care and Progress strengthening /stabilization /functional activity           Timed:  Manual Therapy:         mins  19047;  Therapeutic Exercise:   10      mins  64019;     Neuromuscular Ralph:    30    mins  02899;    Therapeutic Activity:          mins  02003;     Gait Training:           mins  18733;     Ultrasound:          mins  97853;    Electrical Stimulation:         mins  28503;  Iontophoresis          mins  93785    Untimed:  Electrical Stimulation:         mins  07928 ( );  Mechanical Traction:         mins  38626;   Fluidotherapy          mins  35795    Timed Treatment:   40   mins   Total Treatment:     40   mins        Caroline Garcia PTA  Physical Therapist Assistant

## 2024-03-06 ENCOUNTER — TREATMENT (OUTPATIENT)
Dept: PHYSICAL THERAPY | Facility: CLINIC | Age: 69
End: 2024-03-06
Payer: MEDICARE

## 2024-03-06 DIAGNOSIS — R42 DIZZINESS: ICD-10-CM

## 2024-03-06 DIAGNOSIS — R26.89 BALANCE PROBLEM: Primary | ICD-10-CM

## 2024-03-06 PROCEDURE — 97110 THERAPEUTIC EXERCISES: CPT | Performed by: PHYSICAL THERAPIST

## 2024-03-06 PROCEDURE — 97112 NEUROMUSCULAR REEDUCATION: CPT | Performed by: PHYSICAL THERAPIST

## 2024-03-06 NOTE — PROGRESS NOTES
"   Physical Therapy Daily Progress Note    Subjective   Jyoti Puga reports: She does not feel very dizzy now, but she feels a little \"off and wobbly.\" She reports she does not drink much water.      Objective   See Exercise, Manual, and Modality Logs for complete treatment.       Assessment/Plan  Pt tolerated treatment well. She was able to tolerate more advanced balance exercises with mod bouts of instability. She was educated on the importance of drinking water and the role dehydration can play in her balance issues. Pt would benefit from continued skilled PT.     Progress per Plan of Care           Manual Therapy:         mins  46186;  Therapeutic Exercise:    13     mins  74663;     Neuromuscular Ralph:    10    mins  96673;    Therapeutic Activity:          mins  66003;     Gait Training:           mins  81919;     Ultrasound:          mins  19943;    Electrical Stimulation:         mins  11525 ( );  E-Stim Attended:         mins  58610  Iontophoresis          mins 90321   Traction          mins  31374  Fluidotherapy          mins  75955  Dry Needling          mins self-pay - No Charge  Paraffin          mins  56780    Timed Treatment:   23   mins   Total Treatment:     42   mins    Avis Valiente, PT, DPT  Physical Therapist  "

## 2024-04-12 ENCOUNTER — LAB (OUTPATIENT)
Dept: INTERNAL MEDICINE | Facility: CLINIC | Age: 69
End: 2024-04-12
Payer: MEDICARE

## 2024-04-12 ENCOUNTER — OFFICE VISIT (OUTPATIENT)
Dept: INTERNAL MEDICINE | Facility: CLINIC | Age: 69
End: 2024-04-12
Payer: MEDICARE

## 2024-04-12 VITALS
SYSTOLIC BLOOD PRESSURE: 126 MMHG | HEIGHT: 67 IN | HEART RATE: 60 BPM | BODY MASS INDEX: 33.87 KG/M2 | RESPIRATION RATE: 14 BRPM | TEMPERATURE: 97.1 F | WEIGHT: 215.8 LBS | DIASTOLIC BLOOD PRESSURE: 82 MMHG | OXYGEN SATURATION: 93 %

## 2024-04-12 DIAGNOSIS — E78.00 PURE HYPERCHOLESTEROLEMIA: Chronic | ICD-10-CM

## 2024-04-12 DIAGNOSIS — Z87.891 PERSONAL HISTORY OF TOBACCO USE, PRESENTING HAZARDS TO HEALTH: ICD-10-CM

## 2024-04-12 DIAGNOSIS — I10 PRIMARY HYPERTENSION: Chronic | ICD-10-CM

## 2024-04-12 DIAGNOSIS — F32.0 CURRENT MILD EPISODE OF MAJOR DEPRESSIVE DISORDER WITHOUT PRIOR EPISODE: Chronic | ICD-10-CM

## 2024-04-12 DIAGNOSIS — G62.9 NEUROPATHY: Chronic | ICD-10-CM

## 2024-04-12 DIAGNOSIS — E78.00 PURE HYPERCHOLESTEROLEMIA: Primary | Chronic | ICD-10-CM

## 2024-04-12 LAB
ALBUMIN SERPL-MCNC: 4.5 G/DL (ref 3.5–5.2)
ALBUMIN/GLOB SERPL: 2 G/DL
ALP SERPL-CCNC: 54 U/L (ref 39–117)
ALT SERPL W P-5'-P-CCNC: 16 U/L (ref 1–33)
ANION GAP SERPL CALCULATED.3IONS-SCNC: 9 MMOL/L (ref 5–15)
AST SERPL-CCNC: 23 U/L (ref 1–32)
BILIRUB SERPL-MCNC: 1 MG/DL (ref 0–1.2)
BUN SERPL-MCNC: 18 MG/DL (ref 8–23)
BUN/CREAT SERPL: 29 (ref 7–25)
CALCIUM SPEC-SCNC: 10 MG/DL (ref 8.6–10.5)
CHLORIDE SERPL-SCNC: 102 MMOL/L (ref 98–107)
CHOLEST SERPL-MCNC: 154 MG/DL (ref 0–200)
CO2 SERPL-SCNC: 29 MMOL/L (ref 22–29)
CREAT SERPL-MCNC: 0.62 MG/DL (ref 0.57–1)
DEPRECATED RDW RBC AUTO: 39.5 FL (ref 37–54)
EGFRCR SERPLBLD CKD-EPI 2021: 98.4 ML/MIN/1.73
ERYTHROCYTE [DISTWIDTH] IN BLOOD BY AUTOMATED COUNT: 12.3 % (ref 12.3–15.4)
GLOBULIN UR ELPH-MCNC: 2.3 GM/DL
GLUCOSE SERPL-MCNC: 95 MG/DL (ref 65–99)
HCT VFR BLD AUTO: 44.6 % (ref 34–46.6)
HDLC SERPL-MCNC: 48 MG/DL (ref 40–60)
HGB BLD-MCNC: 14.8 G/DL (ref 12–15.9)
LDLC SERPL CALC-MCNC: 66 MG/DL (ref 0–100)
LDLC/HDLC SERPL: 1.16 {RATIO}
MCH RBC QN AUTO: 29.5 PG (ref 26.6–33)
MCHC RBC AUTO-ENTMCNC: 33.2 G/DL (ref 31.5–35.7)
MCV RBC AUTO: 88.8 FL (ref 79–97)
PLATELET # BLD AUTO: 193 10*3/MM3 (ref 140–450)
PMV BLD AUTO: 11.5 FL (ref 6–12)
POTASSIUM SERPL-SCNC: 4.2 MMOL/L (ref 3.5–5.2)
PROT SERPL-MCNC: 6.8 G/DL (ref 6–8.5)
RBC # BLD AUTO: 5.02 10*6/MM3 (ref 3.77–5.28)
SODIUM SERPL-SCNC: 140 MMOL/L (ref 136–145)
TRIGL SERPL-MCNC: 251 MG/DL (ref 0–150)
TSH SERPL DL<=0.05 MIU/L-ACNC: 2.36 UIU/ML (ref 0.27–4.2)
VLDLC SERPL-MCNC: 40 MG/DL (ref 5–40)
WBC NRBC COR # BLD AUTO: 6.73 10*3/MM3 (ref 3.4–10.8)

## 2024-04-12 PROCEDURE — 84443 ASSAY THYROID STIM HORMONE: CPT | Performed by: INTERNAL MEDICINE

## 2024-04-12 PROCEDURE — 85027 COMPLETE CBC AUTOMATED: CPT | Performed by: INTERNAL MEDICINE

## 2024-04-12 PROCEDURE — 80053 COMPREHEN METABOLIC PANEL: CPT | Performed by: INTERNAL MEDICINE

## 2024-04-12 PROCEDURE — 80061 LIPID PANEL: CPT | Performed by: INTERNAL MEDICINE

## 2024-04-12 PROCEDURE — 36415 COLL VENOUS BLD VENIPUNCTURE: CPT | Performed by: INTERNAL MEDICINE

## 2024-04-29 DIAGNOSIS — G62.9 NEUROPATHY: ICD-10-CM

## 2024-05-01 RX ORDER — GABAPENTIN 600 MG/1
TABLET ORAL
Qty: 180 TABLET | Refills: 0 | Status: SHIPPED | OUTPATIENT
Start: 2024-05-01

## 2024-06-24 ENCOUNTER — HOSPITAL ENCOUNTER (OUTPATIENT)
Dept: CT IMAGING | Facility: HOSPITAL | Age: 69
Discharge: HOME | End: 2024-06-24
Admitting: INTERNAL MEDICINE
Payer: MEDICARE

## 2024-06-24 DIAGNOSIS — Z87.891 PERSONAL HISTORY OF TOBACCO USE, PRESENTING HAZARDS TO HEALTH: ICD-10-CM

## 2024-06-24 PROCEDURE — 71271 CT THORAX LUNG CANCER SCR C-: CPT

## 2024-06-26 ENCOUNTER — TELEPHONE (OUTPATIENT)
Dept: INTERNAL MEDICINE | Facility: CLINIC | Age: 69
End: 2024-06-26
Payer: MEDICARE

## 2024-06-26 NOTE — TELEPHONE ENCOUNTER
----- Message from Waleska Miller sent at 6/25/2024  3:42 PM EDT -----  Can you let pt know, her CT scan is stable w/ no signs of lung cancer, recheck in 1 year

## 2024-08-04 DIAGNOSIS — G62.9 NEUROPATHY: ICD-10-CM

## 2024-08-06 RX ORDER — GABAPENTIN 600 MG/1
TABLET ORAL
Qty: 180 TABLET | Refills: 0 | Status: SHIPPED | OUTPATIENT
Start: 2024-08-06

## 2024-08-06 NOTE — TELEPHONE ENCOUNTER
Rx Refill Note  Requested Prescriptions     Pending Prescriptions Disp Refills    gabapentin (NEURONTIN) 600 MG tablet [Pharmacy Med Name: Gabapentin 600 MG Oral Tablet] 180 tablet 0     Sig: Take 1 tablet by mouth twice daily      Last office visit with prescribing clinician: Visit date not found   Last telemedicine visit with prescribing clinician: Visit date not found   Next office visit with prescribing clinician:  10/11/2024      Shamika Lu MA  08/06/24, 08:50 EDT

## 2024-09-09 ENCOUNTER — OFFICE VISIT (OUTPATIENT)
Dept: CARDIOLOGY | Facility: CLINIC | Age: 69
End: 2024-09-09
Payer: MEDICARE

## 2024-09-09 VITALS
SYSTOLIC BLOOD PRESSURE: 112 MMHG | HEIGHT: 67 IN | BODY MASS INDEX: 33.1 KG/M2 | HEART RATE: 61 BPM | WEIGHT: 210.9 LBS | OXYGEN SATURATION: 93 % | DIASTOLIC BLOOD PRESSURE: 74 MMHG

## 2024-09-09 DIAGNOSIS — E78.00 PURE HYPERCHOLESTEROLEMIA: Chronic | ICD-10-CM

## 2024-09-09 DIAGNOSIS — I10 PRIMARY HYPERTENSION: Primary | Chronic | ICD-10-CM

## 2024-09-09 PROCEDURE — G2211 COMPLEX E/M VISIT ADD ON: HCPCS | Performed by: INTERNAL MEDICINE

## 2024-09-09 PROCEDURE — 3078F DIAST BP <80 MM HG: CPT | Performed by: INTERNAL MEDICINE

## 2024-09-09 PROCEDURE — 99214 OFFICE O/P EST MOD 30 MIN: CPT | Performed by: INTERNAL MEDICINE

## 2024-09-09 PROCEDURE — 3074F SYST BP LT 130 MM HG: CPT | Performed by: INTERNAL MEDICINE

## 2024-09-09 NOTE — PROGRESS NOTES
[facility]  Office visit  Jyoti Puga  1955  [phone]  There is no work phone number on file.    VISIT DATE:  9/9/2024    PCP: Waleska Miller MD  [address]  [facility] [address]    CC:  Chief Complaint   Patient presents with    <9>Primary hypertension       Previous cardiac studies and procedures:  2017 stress echocardiogram  The test is negative for anginal symptoms or diagnostic ST segment changes.  Resting echocardiogram showed normal left ventricular systolic function, estimated ejection fraction 60%, mild mitral irritation, mild tricuspid regurg dilatation.  Normal stress echocardiogram, no evidence of dobutamine-induced ischemia.  Normal post stress ejection fraction of 75%.    September 2023  TTE    Left ventricular systolic function is low normal. Calculated left ventricular EF = 47% Left ventricular ejection fraction appears to be 51 - 55%.    Left ventricular diastolic function was normal.  Myocardial perfusion imaging    Nondiagnostic pharmacologic nuclear stress test due to GI attenuation artifact    Left ventricular ejection fraction is normal (Calculated EF = 55%).    Coronary artery calcification noted on CT attenuation correction images.    October 2023   ambulatory ECG monitor, 2-week: NSVT, runs of nonsustained atrial tachycardia.  LHC:  Luminal irregularities only visualized in the proximal and mid portions of the major epicardial vessels.  No aortic stenosis  Normal LVEDP    ASSESSMENT:   Diagnosis Plan   1. Primary hypertension        2. Pure hypercholesterolemia              PLAN:  Hypertension: Goal less than 130/80 mmHg.  Currently well controlled, continue current medical therapy.    Hyperlipidemia: Goal LDL less than 70.  Coronary calcifications.  Continue high intensity statin therapy, rosuvastatin 20 mg p.o. daily.  Continue dietary modifications and regular exercise.    Subjective  Interval assessment: Currently  "denies chest pain..  Denies dyspnea or palpitations.  Blood pressures running less than 130/80 mmHg.  She is compliant with medical therapy.    Initial: 68-year-old previous smoker with history of hypertension dyslipidemia presenting with exertional chest discomfort.  Initially presented earlier this summer with exertional nausea and diaphoresis as she was mowing her lawn which lasted for 2 to 3 days.  Since that time when she tries to exercise she develops left precordial chest discomfort which will often radiate to her left arm with associated dyspnea.  Relieved with rest.  Class II pattern.  Currently without any rest symptoms.  She is compliant with medical therapy to include calcium channel blockade, beta-blockade, aspirin and statin therapy.  Reviewed previous cardiac evaluation.    PHYSICAL EXAMINATION:  Vitals:    09/09/24 1324   BP: 112/74   BP Location: Right arm   Patient Position: Sitting   Pulse: 61   SpO2: 93%   Weight: 95.7 kg (210 lb 14.4 oz)   Height: 170.2 cm (67\")     General Appearance:    Alert, cooperative, no distress, appears stated age   Head:    Normocephalic, without obvious abnormality, atraumatic   Eyes:    conjunctiva/corneas clear   Nose:   Nares normal, septum midline, mucosa normal, no drainage   Throat:   Lips, teeth and gums normal   Neck:   Supple, symmetrical, trachea midline, no carotid    bruit or JVD   Lungs:     Clear to auscultation bilaterally, respirations unlabored   Chest Wall:    No tenderness or deformity    Heart:    Regular rate and rhythm, S1 and S2 normal, 2/6 early peaking systolic murmur right upper sternal border, no rub   or gallop, normal carotid impulse bilaterally without bruit.   Abdomen:     Soft, non-tender   Extremities:   Extremities normal, atraumatic, no cyanosis or edema   Pulses:   2+ and symmetric all extremities   Skin:   Skin color, texture, turgor normal, no rashes or lesions       Diagnostic Data:  Procedures  Lab Results   Component Value Date "    TRIG 251 (H) 04/12/2024    HDL 48 04/12/2024     Lab Results   Component Value Date    GLUCOSE 95 04/12/2024    BUN 18 04/12/2024    CREATININE 0.62 04/12/2024     04/12/2024    K 4.2 04/12/2024     04/12/2024    CO2 29.0 04/12/2024     Lab Results   Component Value Date    HGBA1C 5.60 10/13/2023     Lab Results   Component Value Date    WBC 6.73 04/12/2024    HGB 14.8 04/12/2024    HCT 44.6 04/12/2024     04/12/2024       Allergies  Allergies   Allergen Reactions    Codeine Nausea Only    Wellbutrin [Bupropion] Headache       Current Medications    Current Outpatient Medications:     acetaminophen (TYLENOL) 650 MG 8 hr tablet, Take 1 tablet by mouth Daily As Needed., Disp: , Rfl:     amLODIPine (NORVASC) 5 MG tablet, Take 1 tablet by mouth Daily., Disp: 90 tablet, Rfl: 3    Cholecalciferol (vitamin D3) 125 MCG (5000 UT) capsule capsule, Take 1 capsule by mouth Every 7 (Seven) Days., Disp: , Rfl:     Coenzyme Q10-Vitamin E (QUNOL ULTRA COQ10 PO), Take 100 mg by mouth Daily., Disp: , Rfl:     diclofenac (VOLTAREN) 1 % gel gel, Apply 4 g topically to the appropriate area as directed 3 (Three) Times a Day. Apply to foot, Disp: 100 g, Rfl: 11    diphenhydrAMINE (BENADRYL) 50 MG capsule, Take 1 capsule by mouth Every Night., Disp: , Rfl:     ferrous sulfate 325 (65 FE) MG tablet, Take 1 tablet by mouth Daily With Breakfast., Disp: , Rfl:     fluticasone (FLONASE) 50 MCG/ACT nasal spray, 2 sprays into the nostril(s) as directed by provider Daily., Disp: 48 g, Rfl: 3    gabapentin (NEURONTIN) 600 MG tablet, Take 1 tablet by mouth twice daily, Disp: 180 tablet, Rfl: 0    LYSINE PO, Take  by mouth Daily., Disp: , Rfl:     metoprolol succinate XL (TOPROL-XL) 25 MG 24 hr tablet, Take 1 tablet by mouth Daily., Disp: 90 tablet, Rfl: 3    RABEprazole (ACIPHEX) 20 MG EC tablet, Take 1 tablet by mouth 2 (Two) Times a Day., Disp: 180 tablet, Rfl: 3    rosuvastatin (CRESTOR) 20 MG tablet, Take 1 tablet by mouth  Daily., Disp: 90 tablet, Rfl: 3    sertraline (Zoloft) 50 MG tablet, 1/2 qd x 1 week, then increase to 1 daily, Disp: 30 tablet, Rfl: 11          ROS  ROS      SOCIAL HX  Social History     Socioeconomic History    Marital status:     Number of children: 0   Tobacco Use    Smoking status: Former     Current packs/day: 0.00     Average packs/day: 1.5 packs/day for 37.0 years (55.5 ttl pk-yrs)     Types: Cigarettes     Start date: 1975     Quit date: 2012     Years since quittin.6     Passive exposure: Never    Smokeless tobacco: Never    Tobacco comments:     16-60   Vaping Use    Vaping status: Former    Substances: Nicotine    Devices: Disposable   Substance and Sexual Activity    Alcohol use: No    Drug use: No    Sexual activity: Not Currently       FAMILY HX  Family History   Problem Relation Age of Onset    Hypertension Mother     Colon polyps Mother     Arthritis Mother         joints    Cancer Mother         Basil cell and Squamous    Heart disease Mother         A-Fib    Hyperlipidemia Mother     Diabetes Father     Alcohol abuse Father             Diabetes Sister     Hypertension Sister     Hyperlipidemia Sister     Diabetes Brother     Hypertension Brother     Melanoma Brother         started on side and has gone to his brain    Colon polyps Brother     Cancer Brother         Melanoma/Passes away from it    Heart disease Brother         A-Fib    Hyperlipidemia Brother     Diabetes Paternal Aunt     Diabetes Other     Diabetes Other     Colon cancer Neg Hx     Breast cancer Neg Hx     Ovarian cancer Neg Hx              González Richard III, MD, FACC

## 2024-10-11 ENCOUNTER — LAB (OUTPATIENT)
Dept: INTERNAL MEDICINE | Facility: CLINIC | Age: 69
End: 2024-10-11
Payer: MEDICARE

## 2024-10-11 ENCOUNTER — OFFICE VISIT (OUTPATIENT)
Dept: INTERNAL MEDICINE | Facility: CLINIC | Age: 69
End: 2024-10-11
Payer: MEDICARE

## 2024-10-11 VITALS
OXYGEN SATURATION: 96 % | HEART RATE: 64 BPM | BODY MASS INDEX: 32.65 KG/M2 | SYSTOLIC BLOOD PRESSURE: 124 MMHG | TEMPERATURE: 96.9 F | WEIGHT: 208 LBS | HEIGHT: 67 IN | DIASTOLIC BLOOD PRESSURE: 76 MMHG

## 2024-10-11 DIAGNOSIS — I10 PRIMARY HYPERTENSION: Chronic | ICD-10-CM

## 2024-10-11 DIAGNOSIS — E78.00 PURE HYPERCHOLESTEROLEMIA: Chronic | ICD-10-CM

## 2024-10-11 DIAGNOSIS — G58.8 OTHER MONONEUROPATHY: Chronic | ICD-10-CM

## 2024-10-11 DIAGNOSIS — Z00.00 INITIAL MEDICARE ANNUAL WELLNESS VISIT: Primary | ICD-10-CM

## 2024-10-11 DIAGNOSIS — Z23 NEED FOR VACCINATION: ICD-10-CM

## 2024-10-11 DIAGNOSIS — R42 DIZZINESS: ICD-10-CM

## 2024-10-11 DIAGNOSIS — F32.0 CURRENT MILD EPISODE OF MAJOR DEPRESSIVE DISORDER WITHOUT PRIOR EPISODE: Chronic | ICD-10-CM

## 2024-10-11 LAB
ALBUMIN SERPL-MCNC: 4.1 G/DL (ref 3.5–5.2)
ALBUMIN/GLOB SERPL: 1.6 G/DL
ALP SERPL-CCNC: 51 U/L (ref 39–117)
ALT SERPL W P-5'-P-CCNC: 12 U/L (ref 1–33)
ANION GAP SERPL CALCULATED.3IONS-SCNC: 8.6 MMOL/L (ref 5–15)
AST SERPL-CCNC: 24 U/L (ref 1–32)
BILIRUB SERPL-MCNC: 0.5 MG/DL (ref 0–1.2)
BUN SERPL-MCNC: 12 MG/DL (ref 8–23)
BUN/CREAT SERPL: 16.9 (ref 7–25)
CALCIUM SPEC-SCNC: 9.5 MG/DL (ref 8.6–10.5)
CHLORIDE SERPL-SCNC: 105 MMOL/L (ref 98–107)
CHOLEST SERPL-MCNC: 137 MG/DL (ref 0–200)
CO2 SERPL-SCNC: 29.4 MMOL/L (ref 22–29)
CREAT SERPL-MCNC: 0.71 MG/DL (ref 0.57–1)
DEPRECATED RDW RBC AUTO: 40 FL (ref 37–54)
EGFRCR SERPLBLD CKD-EPI 2021: 93.3 ML/MIN/1.73
ERYTHROCYTE [DISTWIDTH] IN BLOOD BY AUTOMATED COUNT: 12.2 % (ref 12.3–15.4)
GLOBULIN UR ELPH-MCNC: 2.5 GM/DL
GLUCOSE SERPL-MCNC: 99 MG/DL (ref 65–99)
HCT VFR BLD AUTO: 44 % (ref 34–46.6)
HDLC SERPL-MCNC: 48 MG/DL (ref 40–60)
HGB BLD-MCNC: 14.7 G/DL (ref 12–15.9)
LDLC SERPL CALC-MCNC: 69 MG/DL (ref 0–100)
LDLC/HDLC SERPL: 1.39 {RATIO}
MCH RBC QN AUTO: 30.3 PG (ref 26.6–33)
MCHC RBC AUTO-ENTMCNC: 33.4 G/DL (ref 31.5–35.7)
MCV RBC AUTO: 90.7 FL (ref 79–97)
PLATELET # BLD AUTO: 197 10*3/MM3 (ref 140–450)
PMV BLD AUTO: 12 FL (ref 6–12)
POTASSIUM SERPL-SCNC: 4.6 MMOL/L (ref 3.5–5.2)
PROT SERPL-MCNC: 6.6 G/DL (ref 6–8.5)
RBC # BLD AUTO: 4.85 10*6/MM3 (ref 3.77–5.28)
SODIUM SERPL-SCNC: 143 MMOL/L (ref 136–145)
TRIGL SERPL-MCNC: 111 MG/DL (ref 0–150)
TSH SERPL DL<=0.05 MIU/L-ACNC: 1.64 UIU/ML (ref 0.27–4.2)
VLDLC SERPL-MCNC: 20 MG/DL (ref 5–40)
WBC NRBC COR # BLD AUTO: 6.44 10*3/MM3 (ref 3.4–10.8)

## 2024-10-11 PROCEDURE — 36415 COLL VENOUS BLD VENIPUNCTURE: CPT | Performed by: INTERNAL MEDICINE

## 2024-10-11 PROCEDURE — 84443 ASSAY THYROID STIM HORMONE: CPT | Performed by: INTERNAL MEDICINE

## 2024-10-11 PROCEDURE — 90480 ADMN SARSCOV2 VAC 1/ONLY CMP: CPT | Performed by: INTERNAL MEDICINE

## 2024-10-11 PROCEDURE — 91320 SARSCV2 VAC 30MCG TRS-SUC IM: CPT | Performed by: INTERNAL MEDICINE

## 2024-10-11 PROCEDURE — 1125F AMNT PAIN NOTED PAIN PRSNT: CPT | Performed by: INTERNAL MEDICINE

## 2024-10-11 PROCEDURE — 3078F DIAST BP <80 MM HG: CPT | Performed by: INTERNAL MEDICINE

## 2024-10-11 PROCEDURE — 80053 COMPREHEN METABOLIC PANEL: CPT | Performed by: INTERNAL MEDICINE

## 2024-10-11 PROCEDURE — 1159F MED LIST DOCD IN RCRD: CPT | Performed by: INTERNAL MEDICINE

## 2024-10-11 PROCEDURE — 1160F RVW MEDS BY RX/DR IN RCRD: CPT | Performed by: INTERNAL MEDICINE

## 2024-10-11 PROCEDURE — G0439 PPPS, SUBSEQ VISIT: HCPCS | Performed by: INTERNAL MEDICINE

## 2024-10-11 PROCEDURE — 3074F SYST BP LT 130 MM HG: CPT | Performed by: INTERNAL MEDICINE

## 2024-10-11 PROCEDURE — 80061 LIPID PANEL: CPT | Performed by: INTERNAL MEDICINE

## 2024-10-11 PROCEDURE — 85027 COMPLETE CBC AUTOMATED: CPT | Performed by: INTERNAL MEDICINE

## 2024-10-11 RX ORDER — TOPIRAMATE 25 MG/1
25 TABLET, FILM COATED ORAL 2 TIMES DAILY
Start: 2024-10-11 | End: 2024-10-11

## 2024-10-11 RX ORDER — FLUTICASONE PROPIONATE 50 UG/1
2 SPRAY, METERED NASAL DAILY
Qty: 48 G | Refills: 3 | Status: SHIPPED | OUTPATIENT
Start: 2024-10-11

## 2024-10-11 RX ORDER — LANOLIN ALCOHOL/MO/W.PET/CERES
1000 CREAM (GRAM) TOPICAL DAILY
COMMUNITY

## 2024-10-11 NOTE — PROGRESS NOTES
Subjective   The ABCs of the Annual Wellness Visit  Medicare Wellness Visit      Jyoti Puga is a 69 y.o. patient who presents for a Medicare Wellness Visit.    The following portions of the patient's history were reviewed and   updated as appropriate: allergies, current medications, past family history, past medical history, past social history, past surgical history, and problem list.    Compared to one year ago, the patient's physical   health is the same.  Compared to one year ago, the patient's mental   health is the same.    Recent Hospitalizations:  She was not admitted to the hospital during the last year.     Current Medical Providers:  Patient Care Team:  Waleska Miller MD as PCP - General (Internal Medicine)  Adan Whalen MD as Consulting Physician (Urology)  Romeo Díaz MD as Consulting Physician (Ophthalmology)  González Richard III, MD as Cardiologist (Cardiology)  Navin Aguirre MD as Consulting Physician (Gastroenterology)    Outpatient Medications Prior to Visit   Medication Sig Dispense Refill    acetaminophen (TYLENOL) 650 MG 8 hr tablet Take 1 tablet by mouth Daily As Needed.      amLODIPine (NORVASC) 5 MG tablet Take 1 tablet by mouth Daily. 90 tablet 3    Cholecalciferol (vitamin D3) 125 MCG (5000 UT) capsule capsule Take 1 capsule by mouth Every 7 (Seven) Days.      Coenzyme Q10-Vitamin E (QUNOL ULTRA COQ10 PO) Take 100 mg by mouth Daily.      diclofenac (VOLTAREN) 1 % gel gel Apply 4 g topically to the appropriate area as directed 3 (Three) Times a Day. Apply to foot 100 g 11    diphenhydrAMINE (BENADRYL) 50 MG capsule Take 1 capsule by mouth Every Night.      ferrous sulfate 325 (65 FE) MG tablet Take 1 tablet by mouth Daily With Breakfast.      gabapentin (NEURONTIN) 600 MG tablet Take 1 tablet by mouth twice daily 180 tablet 0    LYSINE PO Take  by mouth Daily.      metoprolol succinate XL (TOPROL-XL) 25 MG 24 hr tablet Take 1 tablet by mouth Daily. 90  "tablet 3    RABEprazole (ACIPHEX) 20 MG EC tablet Take 1 tablet by mouth 2 (Two) Times a Day. 180 tablet 3    rosuvastatin (CRESTOR) 20 MG tablet Take 1 tablet by mouth Daily. 90 tablet 3    vitamin B-12 (CYANOCOBALAMIN) 1000 MCG tablet Take 1 tablet by mouth Daily.      fluticasone (FLONASE) 50 MCG/ACT nasal spray 2 sprays into the nostril(s) as directed by provider Daily. 48 g 3    sertraline (Zoloft) 50 MG tablet 1/2 qd x 1 week, then increase to 1 daily 30 tablet 11     No facility-administered medications prior to visit.     No opioid medication identified on active medication list. I have reviewed chart for other potential  high risk medication/s and harmful drug interactions in the elderly.      Aspirin is not on active medication list.  Aspirin use is not indicated based on review of current medical condition/s. Risk of harm outweighs potential benefits.  .    Patient Active Problem List   Diagnosis    Gastroesophageal reflux disease    Estrada's esophagus    Elevated glucose    Swelling of lymph nodes    Pure hypercholesterolemia    Nephrolithiasis    PUD (peptic ulcer disease)    Neuropathy    LAD (lymphadenopathy), cervical    Vitamin D deficiency    Current mild episode of major depressive disorder without prior episode    Gastroparesis    History of adenomatous polyp of colon    Diverticulitis    Peripheral neuropathy    Mild asthma    Absolute anemia    Obesity (BMI 35.0-39.9 without comorbidity)    Primary hypertension    Unstable angina    Abnormal stress test     Advance Care Planning Advance Directive is not on file.  ACP discussion was held with the patient during this visit. Patient does not have an advance directive, information provided.            Objective   Vitals:    10/11/24 0914   BP: 124/76   BP Location: Right arm   Patient Position: Sitting   Cuff Size: Adult   Pulse: 64   Temp: 96.9 °F (36.1 °C)   SpO2: 96%   Weight: 94.3 kg (208 lb)   Height: 170.2 cm (67\")   PainSc:   3   PainLoc: " "Foot       Estimated body mass index is 32.58 kg/m² as calculated from the following:    Height as of this encounter: 170.2 cm (67\").    Weight as of this encounter: 94.3 kg (208 lb).            Does the patient have evidence of cognitive impairment? No  Lab Results   Component Value Date    TRIG 111 10/11/2024    HDL 48 10/11/2024    LDL 69 10/11/2024    VLDL 20 10/11/2024                                                                                                Health  Risk Assessment    Smoking Status:  Social History     Tobacco Use   Smoking Status Former    Current packs/day: 0.00    Average packs/day: 1.5 packs/day for 37.0 years (55.5 ttl pk-yrs)    Types: Cigarettes    Start date: 1975    Quit date: 2012    Years since quittin.7    Passive exposure: Never   Smokeless Tobacco Never   Tobacco Comments    16-60     Alcohol Consumption:  Social History     Substance and Sexual Activity   Alcohol Use Yes    Comment: occasionally       Fall Risk Screen  STEADI Fall Risk Assessment was completed, and patient is at MODERATE risk for falls. Assessment completed on:10/11/2024    Depression Screening:      10/11/2024     9:22 AM   PHQ-2/PHQ-9 Depression Screening   Retired Little Interest or Pleasure in Doing Things 0-->not at all   Retired Feeling Down, Depressed or Hopeless 1-->several days   Retired PHQ-9: Brief Depression Severity Measure Score 1     Health Habits and Functional and Cognitive Screening:      10/11/2024     9:21 AM   Functional & Cognitive Status   Do you have difficulty preparing food and eating? No   Do you have difficulty bathing yourself, getting dressed or grooming yourself? No   Do you have difficulty using the toilet? No   Do you have difficulty moving around from place to place? No   Do you have trouble with steps or getting out of a bed or a chair? No   Current Diet Unhealthy Diet   Dental Exam Not up to date        Dental Exam Comment dentures   Eye Exam Up to date "   Exercise (times per week) 3 times per week   Current Exercises Include Walking   Do you need help using the phone?  No   Are you deaf or do you have serious difficulty hearing?  No   Do you need help to go to places out of walking distance? No   Do you need help shopping? No   Do you need help preparing meals?  No   Do you need help with housework?  No   Do you need help with laundry? No   Do you need help taking your medications? No   Do you need help managing money? No   Do you ever drive or ride in a car without wearing a seat belt? Yes   Have you felt unusual stress, anger or loneliness in the last month? No   Who do you live with? Alone   If you need help, do you have trouble finding someone available to you? Yes   Have you been bothered in the last four weeks by sexual problems? No   Do you have difficulty concentrating, remembering or making decisions? No           Age-appropriate Screening Schedule:  Refer to the list below for future screening recommendations based on patient's age, sex and/or medical conditions. Orders for these recommended tests are listed in the plan section. The patient has been provided with a written plan.    Health Maintenance List  Health Maintenance   Topic Date Due    ZOSTER VACCINE (2 of 2) 11/26/2024 (Originally 6/7/2024)    INFLUENZA VACCINE  03/31/2025 (Originally 8/1/2024)    BMI FOLLOWUP  02/08/2025    COVID-19 Vaccine (4 - 2023-24 season) 02/11/2025    LUNG CANCER SCREENING  06/24/2025    DXA SCAN  08/16/2025    ANNUAL WELLNESS VISIT  10/11/2025    LIPID PANEL  10/11/2025    MAMMOGRAM  01/18/2026    TDAP/TD VACCINES (3 - Td or Tdap) 05/23/2028    COLORECTAL CANCER SCREENING  12/06/2029    HEPATITIS C SCREENING  Completed    Pneumococcal Vaccine 65+  Completed    PAP SMEAR  Discontinued                                                                                                                                                CMS Preventative Services Quick  Reference  Risk Factors Identified During Encounter  None Identified    The above risks/problems have been discussed with the patient.  Pertinent information has been shared with the patient in the After Visit Summary.  An After Visit Summary and PPPS were made available to the patient.    Follow Up:   Next Medicare Wellness visit to be scheduled in 1 year.         Additional E&M Note during same encounter follows:  Patient has additional, significant, and separately identifiable condition(s)/problem(s) that require work above and beyond the Medicare Wellness Visit     Chief Complaint  Medicare Wellness-subsequent    Subjective   HPI  Elsa is also being seen today for additional medical problem/s.    Review of Systems   Constitutional:  Negative for activity change, appetite change, fatigue and fever.   Respiratory:  Positive for cough. Negative for shortness of breath.    Cardiovascular:  Negative for chest pain and leg swelling.   Musculoskeletal:  Positive for arthralgias.   Allergic/Immunologic: Negative for immunocompromised state.   Neurological:  Negative for seizures, syncope, speech difficulty, weakness and confusion.   Psychiatric/Behavioral:  Negative for decreased concentration and dysphoric mood.         Exercise - walking 10,000 steps on the days she works, 7,000-8,000 on her days off. No strength exercise but will try to add  Diet: trying to eat a little better - more veggies, added some apples. Does like potato chips. Is fasting other than sugar free lemonade. Not a lot of sweet tooth; etoh 1/month    HTN-she is taking amlodipine and metoprolol regularly.       CAD - she sees Dr Richard; heart cath did not show any significant blockages and she is no longer on aspirin but is on Crestor which she is taking regularly.     SVT-has not had any recent palpitations     Dizziness -better.  She is trying to get more exercise in and she did do a few sessions of vestibular PT l which helped and she is trying to  "do the exercises they gave her as well     Depression - she has felt better with the zolof     Neuropathy/bilateral foot pain-she is using gabapentin twice daily and this does seem to help some.  She also had some new inserts which have helped a lot.  She uses compression socks and does tend to have some swelling more in the left foot     Hyperlipidemia-on Crestor and we did stop fenofibrate last visit.  She is fasting today.  She has lost a little bit of weight intentionally-she has been trying to cut back on carbohydrates and total calories     Asthma/COPD-has not needed inhalers or nebulizers recently.  She quit smoking about 5 years ago      Objective   Vital Signs:  /76 (BP Location: Right arm, Patient Position: Sitting, Cuff Size: Adult)   Pulse 64   Temp 96.9 °F (36.1 °C)   Ht 170.2 cm (67\")   Wt 94.3 kg (208 lb)   SpO2 96%   BMI 32.58 kg/m²   Physical Exam  Vitals and nursing note reviewed.   Constitutional:       General: She is not in acute distress.     Appearance: She is well-developed. She is not diaphoretic.   HENT:      Head: Normocephalic and atraumatic.      Right Ear: External ear normal.      Left Ear: External ear normal.      Nose: Nose normal.      Mouth/Throat:      Pharynx: No oropharyngeal exudate.   Eyes:      General: No scleral icterus.        Right eye: No discharge.         Left eye: No discharge.      Conjunctiva/sclera: Conjunctivae normal.      Pupils: Pupils are equal, round, and reactive to light.   Neck:      Thyroid: No thyromegaly.   Cardiovascular:      Rate and Rhythm: Normal rate and regular rhythm.      Heart sounds: Normal heart sounds. No murmur heard.     No friction rub. No gallop.   Pulmonary:      Effort: Pulmonary effort is normal. No respiratory distress.      Breath sounds: Normal breath sounds. No wheezing or rales.   Chest:   Breasts:     Right: No mass, nipple discharge, skin change or tenderness.      Left: No mass, nipple discharge, skin change or " tenderness.   Abdominal:      General: Bowel sounds are normal. There is no distension.      Palpations: Abdomen is soft. There is no mass.      Tenderness: There is no abdominal tenderness. There is no guarding or rebound.   Musculoskeletal:         General: No deformity. Normal range of motion.      Cervical back: Normal range of motion and neck supple.   Lymphadenopathy:      Cervical: No cervical adenopathy.   Skin:     General: Skin is warm and dry.      Coloration: Skin is not pale.      Findings: No erythema or rash.   Neurological:      Mental Status: She is alert and oriented to person, place, and time.      Coordination: Coordination normal.      Gait: Gait (gait normal,balance good) normal.      Deep Tendon Reflexes: Reflexes normal.   Psychiatric:         Behavior: Behavior normal.         Thought Content: Thought content normal.         Judgment: Judgment normal.                 Assessment and Plan               Initial Medicare annual wellness visit  Regular exercise/healthy diet. BSE q month. Sunscreen use encouraged.  Check fasting labs  Living will- form given to pt  Jong due 1/25  Colon ca screening - she did do colon in 12/22- 7-10 yr f/u  DT due 5/28  DEXA due 8/25  Shingles - shingrix discussed -had 1st at pharmacy in 4/24 and she will go for 2nd soon  RSV - she had  She had pneumovax (smoker) in '17,  had prevnar 20 last year  CT lung cancer screen is due 6/25  She does not need paps as she had hysterectomy for benign reasons  covid-give today  Flu- she declines     Pure hypercholesterolemia  Check today     Primary hypertension  Good control    Need for vaccination    Dizziness    Current mild episode of major depressive disorder without prior episode    Other mononeuropathy  Stable one gabapentin. Pt has already signed controlled substance contract. Óscar done today and appropriate.     Orders Placed This Encounter   Procedures    COVID-19 (Pfizer) 12yrs+ (COMIRNATY)    CBC (No Diff)      Standing Status:   Future     Number of Occurrences:   1     Order Specific Question:   Release to patient     Answer:   Routine Release [[id number]]    TSH Rfx On Abnormal To Free T4     Standing Status:   Future     Number of Occurrences:   1     Order Specific Question:   Release to patient     Answer:   Routine Release [[id number]]    Lipid Panel     Standing Status:   Future     Number of Occurrences:   1     Order Specific Question:   Release to patient     Answer:   Routine Release [[id number]]    Comprehensive Metabolic Panel     Standing Status:   Future     Number of Occurrences:   1     Order Specific Question:   Release to patient     Answer:   Routine Release [[id number]]            Follow Up   Return in about 6 months (around 4/11/2025) for Next scheduled follow up.  Patient was given instructions and counseling regarding her condition or for health maintenance advice. Please see specific information pulled into the AVS if appropriate.

## 2024-10-14 NOTE — ASSESSMENT & PLAN NOTE
Stable one gabapentin. Pt has already signed controlled substance contract. Óscar done today and appropriate.

## 2024-11-17 DIAGNOSIS — G62.9 NEUROPATHY: ICD-10-CM

## 2024-11-18 RX ORDER — GABAPENTIN 600 MG/1
TABLET ORAL
Qty: 180 TABLET | Refills: 1 | Status: SHIPPED | OUTPATIENT
Start: 2024-11-18

## 2024-11-18 NOTE — TELEPHONE ENCOUNTER
Rx Refill Note  Requested Prescriptions     Pending Prescriptions Disp Refills    gabapentin (NEURONTIN) 600 MG tablet [Pharmacy Med Name: Gabapentin 600 MG Oral Tablet] 180 tablet 0     Sig: Take 1 tablet by mouth twice daily      Last office visit with prescribing clinician: 10/11/2024   Last telemedicine visit with prescribing clinician: Visit date not found   Next office visit with prescribing clinician: 4/11/2025       Karolyn Fong MA  11/18/24, 15:57 EST

## 2024-12-29 DIAGNOSIS — I44.7 LEFT BUNDLE BRANCH BLOCK: ICD-10-CM

## 2024-12-29 DIAGNOSIS — R07.89 CHEST TIGHTNESS: ICD-10-CM

## 2024-12-29 DIAGNOSIS — R00.2 PALPITATIONS: ICD-10-CM

## 2024-12-30 RX ORDER — ROSUVASTATIN CALCIUM 20 MG/1
20 TABLET, COATED ORAL DAILY
Qty: 90 TABLET | Refills: 1 | Status: SHIPPED | OUTPATIENT
Start: 2024-12-30

## 2024-12-30 RX ORDER — METOPROLOL SUCCINATE 25 MG/1
25 TABLET, EXTENDED RELEASE ORAL DAILY
Qty: 90 TABLET | Refills: 1 | Status: SHIPPED | OUTPATIENT
Start: 2024-12-30

## 2025-01-10 RX ORDER — AMLODIPINE BESYLATE 5 MG/1
5 TABLET ORAL DAILY
Qty: 90 TABLET | Refills: 1 | Status: SHIPPED | OUTPATIENT
Start: 2025-01-10

## 2025-01-28 RX ORDER — RABEPRAZOLE SODIUM 20 MG/1
20 TABLET, DELAYED RELEASE ORAL 2 TIMES DAILY
Qty: 180 TABLET | Refills: 0 | Status: SHIPPED | OUTPATIENT
Start: 2025-01-28

## 2025-01-28 NOTE — TELEPHONE ENCOUNTER
Rx Refill Note  Requested Prescriptions     Pending Prescriptions Disp Refills    RABEprazole (ACIPHEX) 20 MG EC tablet [Pharmacy Med Name: RABEprazole Sodium 20 MG Oral Tablet Delayed Release] 180 tablet 0     Sig: Take 1 tablet by mouth twice daily      Last office visit with prescribing clinician: 10/11/2024     Next office visit with prescribing clinician: 4/11/2025       Jada Link  01/28/25, 13:04 EST

## 2025-02-18 ENCOUNTER — HOSPITAL ENCOUNTER (EMERGENCY)
Facility: HOSPITAL | Age: 70
Discharge: HOME | End: 2025-02-18
Attending: EMERGENCY MEDICINE | Admitting: EMERGENCY MEDICINE
Payer: MEDICARE

## 2025-02-18 ENCOUNTER — APPOINTMENT (OUTPATIENT)
Dept: CT IMAGING | Facility: HOSPITAL | Age: 70
End: 2025-02-18
Payer: MEDICARE

## 2025-02-18 VITALS
BODY MASS INDEX: 32.96 KG/M2 | TEMPERATURE: 98.4 F | SYSTOLIC BLOOD PRESSURE: 159 MMHG | WEIGHT: 210 LBS | OXYGEN SATURATION: 91 % | HEIGHT: 67 IN | RESPIRATION RATE: 24 BRPM | HEART RATE: 72 BPM | DIASTOLIC BLOOD PRESSURE: 83 MMHG

## 2025-02-18 DIAGNOSIS — M54.2 ACUTE NECK PAIN: ICD-10-CM

## 2025-02-18 DIAGNOSIS — S61.112A LACERATION OF LEFT THUMB WITHOUT FOREIGN BODY WITH DAMAGE TO NAIL, INITIAL ENCOUNTER: Primary | ICD-10-CM

## 2025-02-18 DIAGNOSIS — S09.90XA ACUTE HEAD INJURY, INITIAL ENCOUNTER: ICD-10-CM
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
## 2025-02-18 PROCEDURE — 72125 CT NECK SPINE W/O DYE: CPT

## 2025-02-18 PROCEDURE — 90471 IMMUNIZATION ADMIN: CPT | Performed by: NURSE PRACTITIONER

## 2025-02-18 PROCEDURE — 90715 TDAP VACCINE 7 YRS/> IM: CPT | Performed by: NURSE PRACTITIONER

## 2025-02-18 PROCEDURE — 99284 EMERGENCY DEPT VISIT MOD MDM: CPT

## 2025-02-18 PROCEDURE — 70450 CT HEAD/BRAIN W/O DYE: CPT

## 2025-02-18 PROCEDURE — 25010000002 TETANUS-DIPHTH-ACELL PERTUSSIS 5-2.5-18.5 LF-MCG/0.5 SUSPENSION PREFILLED SYRINGE: Performed by: NURSE PRACTITIONER

## 2025-02-18 RX ORDER — IBUPROFEN 200 MG
CAPSULE ORAL
Status: COMPLETED
Start: 2025-02-18 | End: 2025-02-18

## 2025-02-18 RX ORDER — BACITRACIN ZINC, NEOMYCIN SULFATE AND POLYMYXIN B SULFATE 400; 5; 5000 [IU]/G; MG/G; [IU]/G
1 OINTMENT TOPICAL ONCE
Status: COMPLETED | OUTPATIENT
Start: 2025-02-18 | End: 2025-02-18

## 2025-02-18 RX ADMIN — TETANUS TOXOID, REDUCED DIPHTHERIA TOXOID AND ACELLULAR PERTUSSIS VACCINE, ADSORBED 0.5 ML: 5; 2.5; 8; 8; 2.5 SUSPENSION INTRAMUSCULAR at 15:54

## 2025-02-18 RX ADMIN — BACITRACIN ZINC, NEOMYCIN, POLYMYXIN B 1 APPLICATION: 400; 3.5; 5 OINTMENT TOPICAL at 15:58

## 2025-02-18 NOTE — Clinical Note
[facility] EMERGENCY DEPARTMENT  [address]  [facility] [address]  Phone: [phone]    Jyoti Puga was seen and treated in our emergency department on 2/18/2025.  She may return to work on 02/20/2025.         Thank you for choosing [facility].    González Peterson APRN

## 2025-02-18 NOTE — ED PROVIDER NOTES
Subjective   History of Present Illness  Pleasant patient who presents to the ER with avulsion injury to her left thumb.  Patient was able to clean this and place her avulsed skin back onto her thumb.  This unfortunately came off today again but the bleeding has stopped.  She is actually en route here to have us look at her thumb however she tripped and fell on some ice striking the back of her head and she has a little bit of neck pain as well.  She denies any fevers chills chest pain difficulty breathing.  She was not knocked out.  She is not anticoagulated.          Review of Systems    Past Medical History:   Diagnosis Date    Acid reflux     Suarez's esophagus     Dr lucas. egd '12, 6/14. repeat in 3 years; '17.    Cancer     skin     Chronic bronchitis     Colitis     Diverticulosis     episode of diverticulitis in 5/20    Dysuria     Elevated glucose     Environmental allergies 11/23/2015    skin testing +trees, mold, grass    H/O mammogram 06/22/2020    Hyperlipidemia     Kidney stones     Mild asthma 11/23/2015    spirometry Dr Echevarriav1 73%, after bronchodilator - 82%. official interpretation - mild restrictive defect    Osteoarthritis of left foot 08/01/2019    MRI Dr Munoz - will try injections    Pain in both feet     Peptic ulcer     tried and failed: pepcid, zantac, tums and nexium    Peripheral neuropathy     Swelling of lymph nodes        Allergies   Allergen Reactions    Codeine Nausea Only    Wellbutrin [Bupropion] Headache       Past Surgical History:   Procedure Laterality Date    CARDIAC CATHETERIZATION N/A 10/13/2023    Procedure: Left Heart Cath;  Surgeon: González Richard III, MD;  Location: [facility] INVASIVE LOC[facility];  Service: Cardiovascular;  Laterality: N/A;    COLONOSCOPY  03/13/2017    Dr. Aguirre    CYSTOSCOPY W/ LASER LITHOTRIPSY  2020    ENDOSCOPY  2015    suarez's esophagus    ENDOSCOPY  03/06/2017    Dr. Aguirre    ENDOSCOPY  02/01/2020    antral ulcers, large  [facility], [facility]    INNER EAR SURGERY      TONSILLECTOMY      TOTAL ABDOMINAL HYSTERECTOMY WITH SALPINGO OOPHORECTOMY      endometriosis       Family History   Problem Relation Age of Onset    Hypertension Mother     Colon polyps Mother     Arthritis Mother         joints    Cancer Mother         Basil cell and Squamous    Heart disease Mother         A-Fib    Hyperlipidemia Mother     Diabetes Father     Alcohol abuse Father             Diabetes Sister     Hypertension Sister     Hyperlipidemia Sister     Diabetes Brother     Hypertension Brother     Melanoma Brother         started on side and has gone to his brain    Colon polyps Brother     Cancer Brother         Melanoma/Passes away from it    Heart disease Brother         A-Fib    Hyperlipidemia Brother     Diabetes Paternal Aunt     Diabetes Other     Diabetes Other     Colon cancer Neg Hx     Breast cancer Neg Hx     Ovarian cancer Neg Hx        Social History     Socioeconomic History    Marital status:     Number of children: 0   Tobacco Use    Smoking status: Former     Current packs/day: 0.00     Average packs/day: 1.5 packs/day for 37.0 years (55.5 ttl pk-yrs)     Types: Cigarettes     Start date: 1975     Quit date: 2012     Years since quittin.1     Passive exposure: Never    Smokeless tobacco: Never    Tobacco comments:     16-60   Vaping Use    Vaping status: Former    Substances: Nicotine    Devices: Disposable   Substance and Sexual Activity    Alcohol use: Yes     Comment: occasionally    Drug use: No    Sexual activity: Not Currently           Objective   Physical Exam  Constitutional:       Appearance: She is well-developed.   HENT:      Head: Normocephalic and atraumatic.      Right Ear: External ear normal.      Left Ear: External ear normal.      Nose: Nose normal.   Eyes:      Conjunctiva/sclera: Conjunctivae normal.      Pupils: Pupils are equal, round, and reactive to light.   Cardiovascular:      Rate and Rhythm:  Normal rate and regular rhythm.      Heart sounds: Normal heart sounds.   Pulmonary:      Effort: Pulmonary effort is normal.      Breath sounds: Normal breath sounds.   Abdominal:      General: Bowel sounds are normal.      Palpations: Abdomen is soft.   Musculoskeletal:         General: Normal range of motion.      Cervical back: Normal range of motion and neck supple.      Comments: Mild paravertebral cervical spinal tenderness.  Step-offs.  No deformities.    Patient has some tenderness to her posterior scalp no evidence of wound or bleeding.      Patient has a small avulsion around 1 cm noted to her left thumb.  Good range of motion of thumb.  There is no active bleeding.  Slight damage to the nail as well.  Nothing to suture up.  She did have a dry dead piece of skin not attached to the area of avulsion that was easily removed off her healthy skin.   Skin:     General: Skin is warm and dry.   Neurological:      Mental Status: She is alert and oriented to person, place, and time.   Psychiatric:         Behavior: Behavior normal.         Thought Content: Thought content normal.         Judgment: Judgment normal.         Procedures           ED Course  ED Course as of 02/18/25 1620   Tue Feb 18, 2025   1543 Will get CAT scans of her head and her neck.  Will place nonstick dressings on her left thumb.  If reassuring CAT scans plan to discharge home. [JM]   1618 Patient resting comfortably .  Reassuring CAT scans.  Patient well aware the signs of worse condition.  All thankful and agreeable. [JM]      ED Course User Index  [JM] González Peterson APRN                                                  CT Head Without Contrast   Final Result   Impression:   No acute intracranial process or calvarial fracture identified.            Electronically Signed: Carlos Kinney MD     2/18/2025 3:52 PM EST     Workstation ID: [id number]      CT Cervical Spine Without Contrast   Final Result   Impression:   Negative for fracture or  traumatic spondylolisthesis of the cervical spine.            Electronically Signed: Constantino Choi MD     2/18/2025 3:58 PM EST     Workstation ID: [id number]               Medical Decision Making  Problems Addressed:  Acute head injury, initial encounter: complicated acute illness or injury  Acute neck pain: complicated acute illness or injury  Fall, initial encounter: complicated acute illness or injury  Laceration of left thumb without foreign body with damage to nail, initial encounter: complicated acute illness or injury    Amount and/or Complexity of Data Reviewed  External Data Reviewed: radiology.  Radiology: ordered.    Risk  OTC drugs.  Prescription drug management.        Final diagnoses:   Laceration of left thumb without foreign body with damage to nail, initial encounter   Acute head injury, initial encounter   Acute neck pain   Fall, initial encounter       ED Disposition  ED Disposition       ED Disposition   Discharge    Condition   Stable    Comment   --               Waleska Miller MD  [address] [address]  [phone]    Schedule an appointment as soon as possible for a visit       [facility] EMERGENCY DEPARTMENT  [address] [facility] [id number] [phone]    If symptoms worsen         Medication List      No changes were made to your prescriptions during this visit.            González Peterson, APRN  02/18/25 [id number]

## 2025-02-18 NOTE — Clinical Note
[facility] EMERGENCY DEPARTMENT  [address]  [facility] [address]  Phone: [phone]    Jyoti Puga was seen and treated in our emergency department on 2/18/2025.  She may return to work on 02/20/2025.         Thank you for choosing [facility].    González Peterson APRN

## 2025-02-28 ENCOUNTER — TRANSCRIBE ORDERS (OUTPATIENT)
Dept: ADMINISTRATIVE | Facility: HOSPITAL | Age: 70
End: 2025-02-28
Payer: MEDICARE

## 2025-02-28 DIAGNOSIS — Z12.31 VISIT FOR SCREENING MAMMOGRAM: Primary | ICD-10-CM

## 2025-03-21 ENCOUNTER — HOSPITAL ENCOUNTER (OUTPATIENT)
Dept: MAMMOGRAPHY | Facility: HOSPITAL | Age: 70
Discharge: HOME | End: 2025-03-21
Admitting: INTERNAL MEDICINE
Payer: MEDICARE

## 2025-03-21 DIAGNOSIS — Z12.31 VISIT FOR SCREENING MAMMOGRAM: ICD-10-CM

## 2025-03-21 PROCEDURE — 77063 BREAST TOMOSYNTHESIS BI: CPT

## 2025-03-21 PROCEDURE — 77067 SCR MAMMO BI INCL CAD: CPT

## 2025-04-11 ENCOUNTER — LAB (OUTPATIENT)
Dept: INTERNAL MEDICINE | Facility: CLINIC | Age: 70
End: 2025-04-11
Payer: MEDICARE

## 2025-04-11 ENCOUNTER — OFFICE VISIT (OUTPATIENT)
Dept: INTERNAL MEDICINE | Facility: CLINIC | Age: 70
End: 2025-04-11
Payer: MEDICARE

## 2025-04-11 VITALS
OXYGEN SATURATION: 95 % | HEART RATE: 72 BPM | SYSTOLIC BLOOD PRESSURE: 122 MMHG | WEIGHT: 224.2 LBS | DIASTOLIC BLOOD PRESSURE: 84 MMHG | HEIGHT: 67 IN | BODY MASS INDEX: 35.19 KG/M2 | TEMPERATURE: 97.2 F

## 2025-04-11 DIAGNOSIS — F32.0 CURRENT MILD EPISODE OF MAJOR DEPRESSIVE DISORDER WITHOUT PRIOR EPISODE: ICD-10-CM

## 2025-04-11 DIAGNOSIS — I10 PRIMARY HYPERTENSION: Primary | Chronic | ICD-10-CM

## 2025-04-11 DIAGNOSIS — Z87.891 PERSONAL HISTORY OF TOBACCO USE, PRESENTING HAZARDS TO HEALTH: ICD-10-CM

## 2025-04-11 DIAGNOSIS — K22.70 BARRETT'S ESOPHAGUS WITHOUT DYSPLASIA: ICD-10-CM

## 2025-04-11 DIAGNOSIS — E78.00 PURE HYPERCHOLESTEROLEMIA: Chronic | ICD-10-CM

## 2025-04-11 DIAGNOSIS — R10.13 EPIGASTRIC PAIN: ICD-10-CM

## 2025-04-11 DIAGNOSIS — G62.9 NEUROPATHY: Chronic | ICD-10-CM

## 2025-04-11 LAB
ALBUMIN SERPL-MCNC: 4.1 G/DL (ref 3.5–5.2)
ALBUMIN/GLOB SERPL: 1.7 G/DL
ALP SERPL-CCNC: 64 U/L (ref 39–117)
ALT SERPL W P-5'-P-CCNC: 14 U/L (ref 1–33)
ANION GAP SERPL CALCULATED.3IONS-SCNC: 11 MMOL/L (ref 5–15)
AST SERPL-CCNC: 27 U/L (ref 1–32)
BILIRUB SERPL-MCNC: 0.7 MG/DL (ref 0–1.2)
BUN SERPL-MCNC: 10 MG/DL (ref 8–23)
BUN/CREAT SERPL: 15.9 (ref 7–25)
CALCIUM SPEC-SCNC: 9.4 MG/DL (ref 8.6–10.5)
CHLORIDE SERPL-SCNC: 101 MMOL/L (ref 98–107)
CHOLEST SERPL-MCNC: 147 MG/DL (ref 0–200)
CO2 SERPL-SCNC: 30 MMOL/L (ref 22–29)
CREAT SERPL-MCNC: 0.63 MG/DL (ref 0.57–1)
DEPRECATED RDW RBC AUTO: 41.7 FL (ref 37–54)
EGFRCR SERPLBLD CKD-EPI 2021: 97.4 ML/MIN/1.73
ERYTHROCYTE [DISTWIDTH] IN BLOOD BY AUTOMATED COUNT: 12.6 % (ref 12.3–15.4)
GLOBULIN UR ELPH-MCNC: 2.4 GM/DL
GLUCOSE SERPL-MCNC: 102 MG/DL (ref 65–99)
HCT VFR BLD AUTO: 43.5 % (ref 34–46.6)
HDLC SERPL-MCNC: 55 MG/DL (ref 40–60)
HGB BLD-MCNC: 14.6 G/DL (ref 12–15.9)
LDLC SERPL CALC-MCNC: 69 MG/DL (ref 0–100)
LDLC/HDLC SERPL: 1.18 {RATIO}
MCH RBC QN AUTO: 30.2 PG (ref 26.6–33)
MCHC RBC AUTO-ENTMCNC: 33.6 G/DL (ref 31.5–35.7)
MCV RBC AUTO: 90.1 FL (ref 79–97)
PLATELET # BLD AUTO: 193 10*3/MM3 (ref 140–450)
PMV BLD AUTO: 11.4 FL (ref 6–12)
POTASSIUM SERPL-SCNC: 4.8 MMOL/L (ref 3.5–5.2)
PROT SERPL-MCNC: 6.5 G/DL (ref 6–8.5)
RBC # BLD AUTO: 4.83 10*6/MM3 (ref 3.77–5.28)
SODIUM SERPL-SCNC: 142 MMOL/L (ref 136–145)
TRIGL SERPL-MCNC: 135 MG/DL (ref 0–150)
VLDLC SERPL-MCNC: 23 MG/DL (ref 5–40)
WBC NRBC COR # BLD AUTO: 5.66 10*3/MM3 (ref 3.4–10.8)

## 2025-04-11 PROCEDURE — 36415 COLL VENOUS BLD VENIPUNCTURE: CPT | Performed by: INTERNAL MEDICINE

## 2025-04-11 PROCEDURE — 80053 COMPREHEN METABOLIC PANEL: CPT | Performed by: INTERNAL MEDICINE

## 2025-04-11 PROCEDURE — 85027 COMPLETE CBC AUTOMATED: CPT | Performed by: INTERNAL MEDICINE

## 2025-04-11 PROCEDURE — 80061 LIPID PANEL: CPT | Performed by: INTERNAL MEDICINE

## 2025-04-11 RX ORDER — TOPIRAMATE 25 MG/1
TABLET, FILM COATED ORAL
Start: 2025-04-11 | End: 2025-04-11 | Stop reason: SDUPTHER

## 2025-04-11 RX ORDER — TOPIRAMATE 25 MG/1
TABLET, FILM COATED ORAL
Qty: 60 TABLET | Refills: 5 | Status: SHIPPED | OUTPATIENT
Start: 2025-04-11

## 2025-04-11 NOTE — PROGRESS NOTES
Chief Complaint  Hyperlipidemia (6 month follow up) and Balance Issues (For the past couple weeks has gotten worse )    Subjective          Jyoti Puga presents to [facility]    HTN-she is taking amlodipine and metoprolol regularly.  She doesn't check at home. Has not felt lightheaded, though has felt a little more off balance at times.  Had done some vestibular PT which had helped.  She does have the exercises so can try to start doing them again.     CAD - she sees Dr Richard once a year, appointment in September; heart cath did not show any significant blockages and she is no longer on aspirin but is on Crestor which she is taking regularly.     Depression - on zoloft 50 - she has started to do 1/2 tablet just recently and wants to try to wean off of it.  She feels like she is managing well with mood.     Neuropathy/bilateral foot pain-she is using gabapentin 600 mg twice daily and this does seem to help some.  She also had some new inserts which help some  She uses compression socks as well.     Hyperlipidemia-on Crestor.  LDL 69 in 10/24. She has regained weight - not eating as healthy and feels like she binge eats sometimes.    Epigastric pain-with overeating, she has started to feel some epigastric pain after she eats.  Taking aciphex bid and no missed doses.  This controls her GERD.  She does have hiatal hernia as well as Estrada's.  BM are not as loose  No melena  No NSAID, no asa use currently.     Asthma/COPD-has not needed inhalers or nebulizers recently.  She quit smoking about 5 years ago.  She does feel like she is a little bit more short of breath but she does not feel like she needs the inhalers again      Current Outpatient Medications:     acetaminophen (TYLENOL) 650 MG 8 hr tablet, Take 1 tablet by mouth Daily As Needed., Disp: , Rfl:     amLODIPine (NORVASC) 5 MG tablet, Take 1 tablet by mouth once daily, Disp: 90 tablet, Rfl: 1    Cholecalciferol (vitamin D3)  "125 MCG (5000 UT) capsule capsule, Take 1 capsule by mouth Every 7 (Seven) Days., Disp: , Rfl:     Coenzyme Q10-Vitamin E (QUNOL ULTRA COQ10 PO), Take 100 mg by mouth Daily., Disp: , Rfl:     diclofenac (VOLTAREN) 1 % gel gel, Apply 4 g topically to the appropriate area as directed 3 (Three) Times a Day. Apply to foot, Disp: 100 g, Rfl: 11    diphenhydrAMINE (BENADRYL) 50 MG capsule, Take 1 capsule by mouth Every Night., Disp: , Rfl:     ferrous sulfate 325 (65 FE) MG tablet, Take 1 tablet by mouth Daily With Breakfast., Disp: , Rfl:     fluticasone (FLONASE) 50 MCG/ACT nasal spray, Administer 2 sprays into the nostril(s) as directed by provider Daily., Disp: 48 g, Rfl: 3    gabapentin (NEURONTIN) 600 MG tablet, Take 1 tablet by mouth twice daily, Disp: 180 tablet, Rfl: 1    LYSINE PO, Take  by mouth Daily., Disp: , Rfl:     metoprolol succinate XL (TOPROL-XL) 25 MG 24 hr tablet, Take 1 tablet by mouth once daily, Disp: 90 tablet, Rfl: 1    RABEprazole (ACIPHEX) 20 MG EC tablet, Take 1 tablet by mouth twice daily, Disp: 180 tablet, Rfl: 0    rosuvastatin (CRESTOR) 20 MG tablet, Take 1 tablet by mouth once daily, Disp: 90 tablet, Rfl: 1    sertraline (Zoloft) 50 MG tablet, 1 qd (Patient taking differently: 1 qd Pt taking half), Disp: 90 tablet, Rfl: 3    topiramate (Topamax) 25 MG tablet, 1qhs x 1 week, then increase to 2 qhs, Disp: 60 tablet, Rfl: 5    vitamin B-12 (CYANOCOBALAMIN) 1000 MCG tablet, Take 1 tablet by mouth Daily., Disp: , Rfl:    The following portions of the patient's history were reviewed and updated as appropriate: allergies, current medications, past family history, past medical history, past social history, past surgical history and problem list.     Objective   Vital Signs:   /84   Pulse 72   Temp 97.2 °F (36.2 °C) (Infrared)   Ht 170.2 cm (67.01\")   Wt 102 kg (224 lb 3.2 oz)   SpO2 95%   BMI 35.11 kg/m²       Physical exam  Constitutional: oriented to person, place, and time.  " "well-developed and well-nourished. No distress.   HENT:   Head: Normocephalic and atraumatic.   Eyes: Conjunctivae and EOM are normal.   Cardiovascular: Normal rate, regular rhythm and normal heart sounds.  Exam reveals no gallop and no friction rub.    No murmur heard.  Pulmonary/Chest: Effort normal and breath sounds normal. No respiratory distress.   no wheezes.   Neurological:  alert and oriented to person, place, and time.   Abd: soft, mild epigastric tenderness, no rebound  Skin: Skin is warm and dry. not diaphoretic.   Psychiatric:  normal mood and affect. behavior is normal. Judgment and thought content normal.      Physical Exam     Result Review :                    Lab Results   Component Value Date    WBC 5.66 04/11/2025    HGB 14.6 04/11/2025    HCT 43.5 04/11/2025    MCV 90.1 04/11/2025     04/11/2025     Lab Results   Component Value Date    GLUCOSE 102 (H) 04/11/2025    BUN 10 04/11/2025    CREATININE 0.63 04/11/2025     04/11/2025    K 4.8 04/11/2025     04/11/2025    CALCIUM 9.4 04/11/2025    PROTEINTOT 6.5 04/11/2025    ALBUMIN 4.1 04/11/2025    ALT 14 04/11/2025    AST 27 04/11/2025    ALKPHOS 64 04/11/2025    BILITOT 0.7 04/11/2025    GLOB 2.4 04/11/2025    AGRATIO 1.7 04/11/2025    BCR 15.9 04/11/2025    ANIONGAP 11.0 04/11/2025    EGFR 97.4 04/11/2025     Lab Results   Component Value Date    CHOL 147 04/11/2025    TRIG 135 04/11/2025    HDL 55 04/11/2025    LDL 69 04/11/2025     Lab Results   Component Value Date    HGBA1C 5.60 10/13/2023     Lab Results   Component Value Date    TSH 1.640 10/11/2024     No results found for: \"[id number]\"         Assessment and Plan      Diagnoses and all orders for this visit:    1. Primary hypertension (Primary) -good control    2. Pure hypercholesterolemia-check labs today.  Try to improve diet  -     Comprehensive Metabolic Panel; Future  -     Lipid Panel; Future  -     CBC (No Diff); Future    3. Neuropathy-stable on gabapentin.  Patient " has signed controlled substance contract.  Óscar appropriate.  She will need a refill on the gabapentin in June.  Follow-up in 6 months    4. Personal history of tobacco use, presenting hazards to health-CT lung cancer screen is due in June so we will go ahead and get this ordered  -      CT Chest Low Dose Cancer Screening WO; Future    5. Estrada's esophagus without dysplasia-last EGD was 4 years ago and 3-year follow-up recommended so we will go ahead and get this scheduled.  She is also having epigastric pain  -     Ambulatory referral for Screening EGD    6. Epigastric pain-she will continue the twice daily Aciphex.  She will try to improve diet as she does feel like she is binge eating contributing to the pain  -     Ambulatory referral for Screening EGD    7.  Depression-patient doing well with Zoloft but wants to go ahead and wean off and has decreased to half tablet.  She will continue weaning further on this as long as she does well.  We will go ahead and try Topamax at night to see if this helps some with her overeating  -     Discontinue: topiramate (Topamax) 25 MG tablet; 1qd  -     topiramate (Topamax) 25 MG tablet; 1qhs x 1 week, then increase to 2 qhs  Dispense: 60 tablet; Refill: 5          Follow Up   Return in about 6 months (around 10/11/2025) for Medicare Wellness.  Patient was given instructions and counseling regarding her condition or for health maintenance advice. Please see specific information pulled into the AVS if appropriate.

## 2025-05-16 DIAGNOSIS — G62.9 NEUROPATHY: ICD-10-CM

## 2025-05-16 RX ORDER — GABAPENTIN 600 MG/1
600 TABLET ORAL EVERY 12 HOURS SCHEDULED
Qty: 180 TABLET | OUTPATIENT
Start: 2025-05-16

## 2025-05-16 RX ORDER — RABEPRAZOLE SODIUM 20 MG/1
20 TABLET, DELAYED RELEASE ORAL EVERY 12 HOURS SCHEDULED
Qty: 180 TABLET | Refills: 3 | Status: SHIPPED | OUTPATIENT
Start: 2025-05-16

## 2025-05-16 NOTE — TELEPHONE ENCOUNTER
Rx Refill Note  Requested Prescriptions     Pending Prescriptions Disp Refills    RABEprazole (ACIPHEX) 20 MG EC tablet [Pharmacy Med Name: RABEprazole SOD DR 20 MG TAB] 180 tablet 0     Sig: TAKE 1 TABLET BY MOUTH 2 TIMES A DAY    gabapentin (NEURONTIN) 600 MG tablet [Pharmacy Med Name: GABAPENTIN 600 MG TABLET] 180 tablet      Sig: TAKE 1 TABLET BY MOUTH 2 TIMES A DAY      Last office visit with prescribing clinician: 4/11/2025   Last telemedicine visit with prescribing clinician: Visit date not found   Next office visit with prescribing clinician: 10/17/2025                         Would you like a call back once the refill request has been completed: [] Yes [] No    If the office needs to give you a call back, can they leave a voicemail: [] Yes [] No    Kaila Guido  05/16/25, 13:37 EDT

## 2025-05-20 ENCOUNTER — PRIOR AUTHORIZATION (OUTPATIENT)
Dept: INTERNAL MEDICINE | Facility: CLINIC | Age: 70
End: 2025-05-20
Payer: MEDICARE

## 2025-05-20 NOTE — TELEPHONE ENCOUNTER
Pa submitted through CoverMyMeds for Rabeprazole Sodium 20MG tablet    Key: [id number]    Awaiting determination.    Claim was rejected but I submitted an appeal and it was approved.    Pharmacy was notified and they said it was going to be [phone].

## 2025-05-21 DIAGNOSIS — G62.9 NEUROPATHY: ICD-10-CM

## 2025-05-21 RX ORDER — GABAPENTIN 600 MG/1
600 TABLET ORAL EVERY 12 HOURS SCHEDULED
Qty: 180 TABLET | OUTPATIENT
Start: 2025-05-21

## 2025-05-21 NOTE — TELEPHONE ENCOUNTER
Rx Refill Note  Requested Prescriptions     Pending Prescriptions Disp Refills    gabapentin (NEURONTIN) 600 MG tablet [Pharmacy Med Name: GABAPENTIN 600 MG TABLET] 180 tablet      Sig: TAKE 1 TABLET BY MOUTH 2 TIMES A DAY      Last office visit with prescribing clinician: 4/11/2025   Last telemedicine visit with prescribing clinician: Visit date not found   Next office visit with prescribing clinician: 10/17/2025                         Would you like a call back once the refill request has been completed: [] Yes [] No    If the office needs to give you a call back, can they leave a voicemail: [] Yes [] No    Kaila Gudio  05/21/25, 15:20 EDT

## 2025-06-13 ENCOUNTER — TELEPHONE (OUTPATIENT)
Dept: GASTROENTEROLOGY | Facility: CLINIC | Age: 70
End: 2025-06-13

## 2025-06-13 NOTE — TELEPHONE ENCOUNTER
"   Caller: Jyoti Puga \"Elsa\"    Relationship to patient: Self    Best call back number:  [phone]    Chief complaint: CANCEL PROCEDURE    Type of visit: EGD    Requested date: NONE     If rescheduling, when is the original appointment: 06/20/2025     Additional notes:PT WANTS TO CANCEL AND NOT RESCHEDULE PROCEDURE.             "

## 2025-06-25 ENCOUNTER — OFFICE VISIT (OUTPATIENT)
Dept: INTERNAL MEDICINE | Facility: CLINIC | Age: 70
End: 2025-06-25
Payer: MEDICARE

## 2025-06-25 VITALS
OXYGEN SATURATION: 93 % | SYSTOLIC BLOOD PRESSURE: 114 MMHG | HEART RATE: 88 BPM | DIASTOLIC BLOOD PRESSURE: 76 MMHG | WEIGHT: 216.8 LBS | TEMPERATURE: 97.4 F | BODY MASS INDEX: 34.03 KG/M2 | HEIGHT: 67 IN

## 2025-06-25 DIAGNOSIS — R05.9 COUGH, UNSPECIFIED TYPE: Primary | ICD-10-CM

## 2025-06-25 DIAGNOSIS — R09.81 NASAL CONGESTION: ICD-10-CM

## 2025-06-25 DIAGNOSIS — R06.2 WHEEZING: ICD-10-CM

## 2025-06-25 LAB
EXPIRATION DATE: NORMAL
EXPIRATION DATE: NORMAL
FLUAV AG UPPER RESP QL IA.RAPID: NOT DETECTED
FLUBV AG UPPER RESP QL IA.RAPID: NOT DETECTED
INTERNAL CONTROL: NORMAL
INTERNAL CONTROL: NORMAL
Lab: NORMAL
Lab: NORMAL
S PYO AG THROAT QL: NEGATIVE
SARS-COV-2 AG UPPER RESP QL IA.RAPID: NOT DETECTED

## 2025-06-25 RX ORDER — BENZONATATE 100 MG/1
100 CAPSULE ORAL 3 TIMES DAILY PRN
Qty: 30 CAPSULE | Refills: 0 | Status: SHIPPED | OUTPATIENT
Start: 2025-06-25

## 2025-06-25 RX ORDER — ALBUTEROL SULFATE 90 UG/1
2 INHALANT RESPIRATORY (INHALATION) EVERY 4 HOURS PRN
Qty: 18 G | Refills: 1 | Status: SHIPPED | OUTPATIENT
Start: 2025-06-25

## 2025-06-25 RX ORDER — AZITHROMYCIN 250 MG/1
TABLET, FILM COATED ORAL
Qty: 6 TABLET | Refills: 0 | Status: SHIPPED | OUTPATIENT
Start: 2025-06-25

## 2025-06-25 NOTE — PROGRESS NOTES
"Subjective   Jyoti Puga is a 69 y.o. female.         Chief Complaint   Patient presents with    Cough     Started on Sunday. Nasal congestion and cough        Visit Vitals  /76   Pulse 88   Temp 97.4 °F (36.3 °C) (Infrared)   Ht 170.2 cm (67.01\")   Wt 98.3 kg (216 lb 12.8 oz)   SpO2 93%   BMI 33.95 kg/m²       Wt Readings from Last 3 Encounters:   06/25/25 98.3 kg (216 lb 12.8 oz)   04/11/25 102 kg (224 lb 3.2 oz)   02/18/25 95.3 kg (210 lb)         Cough  Chronicity:  New  Onset:  In the past 7 days  Progression since onset:  Unchanged  Frequency:  Constantly  Cough characteristics:  Productive of bloody sputum  Associated symptoms: ear pain, hemoptysis, myalgias, nasal congestion, postnasal drip, rhinorrhea, sweats and wheezing    Associated symptoms: no chest pain, no chills, no ear congestion, no fever, no headaches, no heartburn, no rash, no shortness of breath, no sore throat and no weight loss    Exacerbated by: moving around.  Treatments tried:  OTC cough suppressant  Improvement on treatment:  Mild  PMH includes: asthma and bronchitis    PMH includes: no bronchiectasis, no COPD, no emphysema, no environmental allergies and no pneumonia         The following portions of the patient's history were reviewed and updated as appropriate: allergies, current medications, past family history, past medical history, past social history, past surgical history, and problem list.    Past Medical History:   Diagnosis Date    Acid reflux     Estrada's esophagus     Dr lucas. egd '12, 6/14. repeat in 3 years; '17.    Cancer     skin     Chronic bronchitis     Colitis     Diverticulosis     episode of diverticulitis in 5/20    Dysuria     Elevated glucose     Environmental allergies 11/23/2015    skin testing +trees, mold, grass    H/O mammogram 06/22/2020    Hyperlipidemia     Kidney stones     Mild asthma 11/23/2015    spirometry Dr Davisfev1 73%, after bronchodilator - 82%. official interpretation - " mild restrictive defect    Osteoarthritis of left foot 2019    MRI Dr Munoz - will try injections    Pain in both feet     Peptic ulcer     tried and failed: pepcid, zantac, tums and nexium    Peripheral neuropathy     Swelling of lymph nodes       Past Surgical History:   Procedure Laterality Date    CARDIAC CATHETERIZATION N/A 10/13/2023    Procedure: Left Heart Cath;  Surgeon: González Richard III, MD;  Location: [facility] CATH INVASIVE LOCATION;  Service: Cardiovascular;  Laterality: N/A;    COLONOSCOPY  2017    Dr. Aguirre    CYSTOSCOPY W/ LASER LITHOTRIPSY      ENDOSCOPY      suarez's esophagus    ENDOSCOPY  2017    Dr. Aguirre    ENDOSCOPY  2020    antral ulcers, large HH, Barretts    INNER EAR SURGERY      TONSILLECTOMY      TOTAL ABDOMINAL HYSTERECTOMY WITH SALPINGO OOPHORECTOMY      endometriosis      Family History   Problem Relation Age of Onset    Hypertension Mother     Colon polyps Mother     Arthritis Mother         joints    Cancer Mother         Basil cell and Squamous    Heart disease Mother         A-Fib    Hyperlipidemia Mother     Diabetes Father     Alcohol abuse Father             Diabetes Sister     Hypertension Sister     Hyperlipidemia Sister     Diabetes Brother     Hypertension Brother     Melanoma Brother         started on side and has gone to his brain    Colon polyps Brother     Cancer Brother         Melanoma/Passes away from it    Heart disease Brother         A-Fib    Hyperlipidemia Brother     Diabetes Paternal Aunt     Diabetes Other     Diabetes Other     Colon cancer Neg Hx     Breast cancer Neg Hx     Ovarian cancer Neg Hx       Social History     Socioeconomic History    Marital status:     Number of children: 0   Tobacco Use    Smoking status: Former     Current packs/day: 0.00     Average packs/day: 1.5 packs/day for 37.0 years (55.5 ttl pk-yrs)     Types: Cigarettes     Start date: 1975     Quit date: 2012      Years since quittin.4     Passive exposure: Never    Smokeless tobacco: Never    Tobacco comments:     16-60   Vaping Use    Vaping status: Former    Substances: Nicotine    Devices: Disposable   Substance and Sexual Activity    Alcohol use: Yes     Comment: occasionally    Drug use: No    Sexual activity: Not Currently      Allergies   Allergen Reactions    Codeine Nausea Only    Wellbutrin [Bupropion] Headache       Review of Systems   Constitutional:  Negative for chills, fever and weight loss.   HENT:  Positive for ear pain, postnasal drip and rhinorrhea. Negative for sore throat.    Respiratory:  Positive for cough, hemoptysis and wheezing. Negative for shortness of breath.    Cardiovascular:  Negative for chest pain.   Gastrointestinal:  Negative for heartburn.   Musculoskeletal:  Positive for myalgias.   Skin:  Negative for rash.   Allergic/Immunologic: Negative for environmental allergies.   Neurological:  Negative for headaches.       Objective   Physical Exam  Vitals and nursing note reviewed.   Constitutional:       Appearance: She is well-developed.   HENT:      Head: Normocephalic.      Right Ear: External ear normal.      Left Ear: External ear normal.      Nose: Nose normal.   Eyes:      General: Lids are normal.      Conjunctiva/sclera: Conjunctivae normal.      Pupils: Pupils are equal, round, and reactive to light.   Neck:      Thyroid: No thyroid mass or thyromegaly.      Vascular: No carotid bruit.      Trachea: Trachea normal.   Cardiovascular:      Rate and Rhythm: Normal rate and regular rhythm.      Heart sounds: No murmur heard.  Pulmonary:      Effort: Pulmonary effort is normal. No respiratory distress.      Breath sounds: Examination of the right-upper field reveals wheezing and rhonchi. Wheezing and rhonchi present. No decreased breath sounds or rales.   Chest:      Chest wall: No tenderness.   Abdominal:      General: Bowel sounds are normal.      Palpations: Abdomen is soft.       Tenderness: There is no abdominal tenderness.   Musculoskeletal:         General: Normal range of motion.      Cervical back: Normal range of motion and neck supple.   Skin:     General: Skin is warm and dry.   Neurological:      Mental Status: She is alert and oriented to person, place, and time.   Psychiatric:         Behavior: Behavior normal.         Assessment & Plan   Problems Addressed this Visit    None  Visit Diagnoses         Cough, unspecified type    -  Primary    Relevant Medications    azithromycin (Zithromax Z-Ray) 250 MG tablet    benzonatate (Tessalon Perles) 100 MG capsule    Other Relevant Orders    POCT rapid strep A (Completed)    XR Chest PA & Lateral      Nasal congestion        Relevant Orders    POCT SARS-CoV-2 Antigen ARVIN + Flu (Completed)      Wheezing        Relevant Medications    albuterol sulfate  (90 Base) MCG/ACT inhaler    Other Relevant Orders    XR Chest PA & Lateral          Diagnoses         Codes Comments      Cough, unspecified type    -  Primary ICD-10-CM: R05.9  ICD-9-CM: 786.2       Nasal congestion     ICD-10-CM: R09.81  ICD-9-CM: 478.19       Wheezing     ICD-10-CM: R06.2  ICD-9-CM: 786.07                        Current Outpatient Medications:     acetaminophen (TYLENOL) 650 MG 8 hr tablet, Take 1 tablet by mouth Daily As Needed., Disp: , Rfl:     amLODIPine (NORVASC) 5 MG tablet, Take 1 tablet by mouth once daily, Disp: 90 tablet, Rfl: 1    Cholecalciferol (vitamin D3) 125 MCG (5000 UT) capsule capsule, Take 1 capsule by mouth Every 7 (Seven) Days., Disp: , Rfl:     Coenzyme Q10-Vitamin E (QUNOL ULTRA COQ10 PO), Take 100 mg by mouth Daily., Disp: , Rfl:     diclofenac (VOLTAREN) 1 % gel gel, Apply 4 g topically to the appropriate area as directed 3 (Three) Times a Day. Apply to foot, Disp: 100 g, Rfl: 11    diphenhydrAMINE (BENADRYL) 50 MG capsule, Take 1 capsule by mouth Every Night., Disp: , Rfl:     ferrous sulfate 325 (65 FE) MG tablet, Take 1 tablet by mouth  Daily With Breakfast., Disp: , Rfl:     fluticasone (FLONASE) 50 MCG/ACT nasal spray, Administer 2 sprays into the nostril(s) as directed by provider Daily., Disp: 48 g, Rfl: 3    gabapentin (NEURONTIN) 600 MG tablet, Take 1 tablet by mouth twice daily, Disp: 180 tablet, Rfl: 1    LYSINE PO, Take  by mouth Daily., Disp: , Rfl:     metoprolol succinate XL (TOPROL-XL) 25 MG 24 hr tablet, Take 1 tablet by mouth once daily, Disp: 90 tablet, Rfl: 1    RABEprazole (ACIPHEX) 20 MG EC tablet, TAKE 1 TABLET BY MOUTH 2 TIMES A DAY, Disp: 180 tablet, Rfl: 3    rosuvastatin (CRESTOR) 20 MG tablet, Take 1 tablet by mouth once daily, Disp: 90 tablet, Rfl: 1    sertraline (Zoloft) 50 MG tablet, 1 qd (Patient taking differently: 1 qd Pt taking half), Disp: 90 tablet, Rfl: 3    vitamin B-12 (CYANOCOBALAMIN) 1000 MCG tablet, Take 1 tablet by mouth Daily., Disp: , Rfl:     albuterol sulfate  (90 Base) MCG/ACT inhaler, Inhale 2 puffs Every 4 (Four) Hours As Needed for Wheezing., Disp: 18 g, Rfl: 1    azithromycin (Zithromax Z-Ray) 250 MG tablet, Take 2 tablets the first day, then 1 tablet daily for 4 days., Disp: 6 tablet, Rfl: 0    benzonatate (Tessalon Perles) 100 MG capsule, Take 1 capsule by mouth 3 (Three) Times a Day As Needed for Cough., Disp: 30 capsule, Rfl: 0    topiramate (Topamax) 25 MG tablet, 1qhs x 1 week, then increase to 2 qhs (Patient not taking: Reported on 6/25/2025), Disp: 60 tablet, Rfl: 5    Return in about 4 months (around 10/17/2025), or if symptoms worsen or fail to improve, for Recheck, Next scheduled follow up.    Recent Results (from the past week)   POCT rapid strep A    Collection Time: 06/25/25  3:17 PM    Specimen: Swab   Result Value Ref Range    Rapid Strep A Screen Negative Negative, VALID, INVALID, Not Performed    Internal Control Passed Passed    Lot Number [id number]     Expiration Date 04/24/2026    POCT SARS-CoV-2 Antigen ARVIN + Flu    Collection Time: 06/25/25  3:26 PM    Specimen: Swab    Result Value Ref Range    SARS Antigen Not Detected Not Detected, Presumptive Negative    Influenza A Antigen ARVIN Not Detected Not Detected    Influenza B Antigen ARVIN Not Detected Not Detected    Internal Control Passed Passed    Lot Number [id number]     Expiration Date 03/05/2026

## 2025-06-27 ENCOUNTER — HOSPITAL ENCOUNTER (OUTPATIENT)
Dept: CT IMAGING | Facility: HOSPITAL | Age: 70
Discharge: HOME | End: 2025-06-27
Payer: MEDICARE

## 2025-06-27 DIAGNOSIS — Z87.891 PERSONAL HISTORY OF TOBACCO USE, PRESENTING HAZARDS TO HEALTH: ICD-10-CM

## 2025-06-27 PROCEDURE — 71271 CT THORAX LUNG CANCER SCR C-: CPT

## 2025-06-29 DIAGNOSIS — G62.9 NEUROPATHY: ICD-10-CM

## 2025-06-30 DIAGNOSIS — R00.2 PALPITATIONS: ICD-10-CM

## 2025-06-30 DIAGNOSIS — I44.7 LEFT BUNDLE BRANCH BLOCK: ICD-10-CM

## 2025-06-30 RX ORDER — METOPROLOL SUCCINATE 25 MG/1
25 TABLET, EXTENDED RELEASE ORAL DAILY
Qty: 90 TABLET | Refills: 0 | Status: SHIPPED | OUTPATIENT
Start: 2025-06-30

## 2025-07-01 DIAGNOSIS — R06.2 WHEEZING: ICD-10-CM

## 2025-07-01 DIAGNOSIS — G62.9 NEUROPATHY: ICD-10-CM

## 2025-07-01 RX ORDER — GABAPENTIN 600 MG/1
600 TABLET ORAL EVERY 12 HOURS SCHEDULED
Qty: 180 TABLET | Refills: 0 | Status: SHIPPED | OUTPATIENT
Start: 2025-07-01

## 2025-07-02 RX ORDER — GABAPENTIN 600 MG/1
TABLET ORAL
Qty: 180 TABLET | Refills: 1 | OUTPATIENT
Start: 2025-07-02

## 2025-07-02 RX ORDER — ALBUTEROL SULFATE 90 UG/1
2 INHALANT RESPIRATORY (INHALATION) EVERY 4 HOURS PRN
Qty: 18 G | Refills: 1 | OUTPATIENT
Start: 2025-07-02

## 2025-08-04 DIAGNOSIS — R10.13 EPIGASTRIC PAIN: ICD-10-CM

## 2025-08-04 DIAGNOSIS — K80.20 GALLSTONES: Primary | ICD-10-CM

## 2025-08-18 DIAGNOSIS — R07.89 CHEST TIGHTNESS: ICD-10-CM

## 2025-08-18 RX ORDER — ROSUVASTATIN CALCIUM 20 MG/1
20 TABLET, COATED ORAL DAILY
Qty: 90 TABLET | Refills: 0 | Status: SHIPPED | OUTPATIENT
Start: 2025-08-18

## 2025-08-25 DIAGNOSIS — I44.7 LEFT BUNDLE BRANCH BLOCK: ICD-10-CM

## 2025-08-25 DIAGNOSIS — R00.2 PALPITATIONS: ICD-10-CM

## 2025-08-25 RX ORDER — METOPROLOL SUCCINATE 25 MG/1
25 TABLET, EXTENDED RELEASE ORAL DAILY
Qty: 90 TABLET | Refills: 0 | Status: SHIPPED | OUTPATIENT
Start: 2025-08-25

## 2025-08-27 DIAGNOSIS — I44.7 LEFT BUNDLE BRANCH BLOCK: ICD-10-CM

## 2025-08-27 DIAGNOSIS — R00.2 PALPITATIONS: ICD-10-CM

## 2025-08-27 RX ORDER — METOPROLOL SUCCINATE 25 MG/1
25 TABLET, EXTENDED RELEASE ORAL DAILY
Qty: 90 TABLET | Refills: 0 | OUTPATIENT
Start: 2025-08-27

## 2025-08-27 RX ORDER — RABEPRAZOLE SODIUM 20 MG/1
20 TABLET, DELAYED RELEASE ORAL EVERY 12 HOURS SCHEDULED
Qty: 180 TABLET | Refills: 0 | Status: SHIPPED | OUTPATIENT
Start: 2025-08-27

## 2025-08-28 RX ORDER — AMLODIPINE BESYLATE 5 MG/1
5 TABLET ORAL DAILY
Qty: 90 TABLET | Refills: 0 | Status: SHIPPED | OUTPATIENT
Start: 2025-08-28

## (undated) DEVICE — CATH DIAG EXPO M/ PK 5F FL4/FR4 PIG

## (undated) DEVICE — CATH DIAG EXPO .045 FL3.5 5F 100CM

## (undated) DEVICE — GW PERIPH GUIDERIGHT STD/EXCHNG/J/TIP SS 0.035IN 5X260CM

## (undated) DEVICE — MODEL BT2000 P/N 700287-012KIT CONTENTS: MANIFOLD WITH SALINE AND CONTRAST PORTS, SALINE TUBING WITH SPIKE AND HAND SYRINGE, TRANSDUCER: Brand: BT2000 AUTOMATED MANIFOLD KIT

## (undated) DEVICE — MODEL AT P65, P/N 701554-001KIT CONTENTS: HAND CONTROLLER, 3-WAY HIGH-PRESSURE STOPCOCK WITH ROTATING END AND PREMIUM HIGH-PRESSURE TUBING: Brand: ANGIOTOUCH® KIT

## (undated) DEVICE — INTRO SHEATH PRELUDE EASE HC .025 6F 11CM

## (undated) DEVICE — PK CATH CARD 10

## (undated) DEVICE — DEV COMPR RADL PRELUDESYNCEZ 30ML 32CM